# Patient Record
Sex: MALE | Race: WHITE | Employment: OTHER | URBAN - METROPOLITAN AREA
[De-identification: names, ages, dates, MRNs, and addresses within clinical notes are randomized per-mention and may not be internally consistent; named-entity substitution may affect disease eponyms.]

---

## 2020-05-15 ENCOUNTER — OFFICE VISIT (OUTPATIENT)
Dept: FAMILY MEDICINE CLINIC | Age: 50
End: 2020-05-15
Payer: COMMERCIAL

## 2020-05-15 ENCOUNTER — HOSPITAL ENCOUNTER (OUTPATIENT)
Age: 50
Setting detail: SPECIMEN
Discharge: HOME | End: 2020-05-15
Payer: COMMERCIAL

## 2020-05-15 ENCOUNTER — NURSE ONLY (OUTPATIENT)
Dept: PRIMARY CARE CLINIC | Age: 50
End: 2020-05-15

## 2020-05-15 VITALS
HEART RATE: 56 BPM | SYSTOLIC BLOOD PRESSURE: 112 MMHG | WEIGHT: 186 LBS | RESPIRATION RATE: 12 BRPM | HEIGHT: 74 IN | BODY MASS INDEX: 23.87 KG/M2 | OXYGEN SATURATION: 98 % | DIASTOLIC BLOOD PRESSURE: 60 MMHG | TEMPERATURE: 97.6 F

## 2020-05-15 DIAGNOSIS — Z01.818 PRE-OP TESTING: ICD-10-CM

## 2020-05-15 DIAGNOSIS — Z01.818 PREOP EXAMINATION: ICD-10-CM

## 2020-05-15 LAB
ABO/RH: NORMAL
ANTIBODY SCREEN: NORMAL
APTT: 41.4 SEC (ref 24.4–36.8)
HCT VFR BLD CALC: 36 % (ref 42–52)
HEMOGLOBIN: 11.6 G/DL (ref 14–18)
INR BLD: 1
MCH RBC QN AUTO: 30.3 PG (ref 27–31.3)
MCHC RBC AUTO-ENTMCNC: 32.3 % (ref 33–37)
MCV RBC AUTO: 93.8 FL (ref 80–100)
PDW BLD-RTO: 13.2 % (ref 11.5–14.5)
PLATELET # BLD: 282 K/UL (ref 130–400)
PROTHROMBIN TIME: 13.4 SEC (ref 12.3–14.9)
RBC # BLD: 3.84 M/UL (ref 4.7–6.1)
WBC # BLD: 6.1 K/UL (ref 4.8–10.8)

## 2020-05-15 PROCEDURE — G8427 DOCREV CUR MEDS BY ELIG CLIN: HCPCS | Performed by: NURSE PRACTITIONER

## 2020-05-15 PROCEDURE — 99214 OFFICE O/P EST MOD 30 MIN: CPT | Performed by: NURSE PRACTITIONER

## 2020-05-15 PROCEDURE — G8420 CALC BMI NORM PARAMETERS: HCPCS | Performed by: NURSE PRACTITIONER

## 2020-05-15 PROCEDURE — 1036F TOBACCO NON-USER: CPT | Performed by: NURSE PRACTITIONER

## 2020-05-15 PROCEDURE — U0003 INFECTIOUS AGENT DETECTION BY NUCLEIC ACID (DNA OR RNA); SEVERE ACUTE RESPIRATORY SYNDROME CORONAVIRUS 2 (SARS-COV-2) (CORONAVIRUS DISEASE [COVID-19]), AMPLIFIED PROBE TECHNIQUE, MAKING USE OF HIGH THROUGHPUT TECHNOLOGIES AS DESCRIBED BY CMS-2020-01-R: HCPCS

## 2020-05-15 ASSESSMENT — PATIENT HEALTH QUESTIONNAIRE - PHQ9
2. FEELING DOWN, DEPRESSED OR HOPELESS: 0
1. LITTLE INTEREST OR PLEASURE IN DOING THINGS: 0
SUM OF ALL RESPONSES TO PHQ QUESTIONS 1-9: 0
SUM OF ALL RESPONSES TO PHQ9 QUESTIONS 1 & 2: 0
SUM OF ALL RESPONSES TO PHQ QUESTIONS 1-9: 0

## 2020-05-15 NOTE — PATIENT INSTRUCTIONS
questions or concerns.     · You don't understand how to prepare for your surgery.     · You become ill before the surgery (such as fever, flu, or a cold).     · You need to reschedule or have changed your mind about having the surgery. Where can you learn more? Go to [email or web address] and sign in to your [employer] account. Enter V075 in the [employer] box to learn more about \"Cervical Discectomy: Before Your Surgery. \"     If you do not have an account, please click on the \"Sign Up Now\" link. Current as of: June 26, 2019Content Version: 12.4  © 9500-6179 Healthwise, Incorporated. Care instructions adapted under license by [facility] ([facility]). If you have questions about a medical condition or this instruction, always ask your healthcare professional. [address] any warranty or liability for your use of this information.

## 2020-05-15 NOTE — PROGRESS NOTES
Subjective:   Sal  YOB: 1970  Date of Service: 5/15/2020  Chief Complaint   Patient presents with   Philly Blanca on 5/21/20 cervical fusion       Preoperative Consultation   Sal is a 51 y.o. male who presents to the office today for a preoperative consultation at the request of surgeon Dr. Charis Freeman who plans on performing ACDF (ANTERIOR CERVICAL DISKECTOMY FUSION) on May 21 at [facility]. Planned anesthesia: General   Known anesthesia problems: None --> hx of post-op nausea and vomiting treated previously w/ IV anti-emetic/ scopalomine patch   Bleeding risk: No recent or remote history of abnormal bleeding and: previous anticoagulation -- eliquis s/p arthorscopy last year- not currently taking   Personal or FH of DVT/PE: No    Patient objection to receiving blood products: No    Wt Readings from Last 2 Encounters:   05/15/20 186 lb (84.4 kg)   05/11/20 172 lb (78 kg)     BP Readings from Last 3 Encounters:   05/15/20 112/60      No Known Allergies  No outpatient medications have been marked as taking for the 5/15/20 encounter (Office Visit) with KEO Walker CNP.      Patient Active Problem List   Diagnosis    Lumbago    Anemia    Obesity    Chronic pain    Closed fracture of femur, distal end ([address]    H/O bariatric surgery    Infection of prosthetic right knee joint ([address]    S/P revision of total knee, right    JABARI (obstructive sleep apnea)    Gastric ulcer    History of anticoagulant therapy    Hypothyroidism    Postural orthostatic tachycardia syndrome    Pain due to total right knee replacement ([address]    Encounter for dietary counseling and surveillance    Postlaminectomy syndrome of lumbar region    Intra-abdominal infection    IVCD (intraventricular conduction defect)    Other reduced mobility    Asthma    Depressive disorder    Deficiency of macronutrients    Morbid obesity ([address]    Weight loss    Overweight     Past Medical 7/15/2020    COVID-19 Ambulatory     Standing Status:   Future     Standing Expiration Date:   5/15/2021     Scheduling Instructions:      Saline media preferred given current shortage of viral transport media but both acceptable    Protime-INR     Standing Status:   Future     Number of Occurrences:   1     Standing Expiration Date:   7/15/2020     Order Specific Question:   Daily Coumadin Dose? Answer:   N/A    APTT     Standing Status:   Future     Number of Occurrences:   1     Standing Expiration Date:   7/15/2020     Order Specific Question:   Daily Heparin Dose? Answer:   N/A     2. Change in medication regimen before surgery: per surgical team  3. Prophylaxis for cardiac events with perioperative beta-blockers: Currently taking: metoprolol  4. Deep vein thrombosis prophylaxis: regimen to be chosen by surgical team  5. No contraindications to planned surgery    [facility] is scheduled for neck brace fitting 5/20    Patient expresses concern about post-operative pain control. [facility] has recently been dealing w/ flare-up of fibromyalgia and poorly controlled pain, which has triggered some anxiety re: upcoming surgery. Patient states he forgot to mention his concerns at his last office visit w/ surgery team.     Patient requests sandra-operative records be sent to his PCP--> Dr. Marrero// NP Linda Goldberg, APRN - CNP  5/15/2020      Side effects and adverse effects of any medication prescribed today, as well as treatment plan/rationale, follow-up care, and result expectations have been discussed with the patient. Keyonna expresses understanding and wishes to proceed with the plan. Discussed signs and symptoms which require immediate follow-up in ED/call to 911. Understanding verbalized. I have reviewed and updated the electronic medical record.

## 2020-05-18 PROBLEM — Z74.09 OTHER REDUCED MOBILITY: Status: ACTIVE | Noted: 2020-05-18

## 2020-05-18 PROBLEM — S72.409A: Status: ACTIVE | Noted: 2020-05-18

## 2020-05-18 PROBLEM — Z96.651 S/P REVISION OF TOTAL KNEE, RIGHT: Status: ACTIVE | Noted: 2019-06-10

## 2020-05-18 PROBLEM — Z92.29 HISTORY OF ANTICOAGULANT THERAPY: Status: ACTIVE | Noted: 2020-05-18

## 2020-05-18 PROBLEM — E03.9 HYPOTHYROIDISM: Status: ACTIVE | Noted: 2020-05-18

## 2020-05-18 PROBLEM — Z96.651 PAIN DUE TO TOTAL RIGHT KNEE REPLACEMENT (HCC): Status: ACTIVE | Noted: 2018-09-07

## 2020-05-18 PROBLEM — T84.84XA PAIN DUE TO TOTAL RIGHT KNEE REPLACEMENT (HCC): Status: ACTIVE | Noted: 2018-09-07

## 2020-05-18 PROBLEM — G47.33 OSA (OBSTRUCTIVE SLEEP APNEA): Status: ACTIVE | Noted: 2020-05-18

## 2020-05-18 PROBLEM — G89.29 CHRONIC PAIN: Status: ACTIVE | Noted: 2020-05-18

## 2020-05-18 PROBLEM — T84.53XA INFECTION OF PROSTHETIC RIGHT KNEE JOINT (HCC): Status: ACTIVE | Noted: 2019-06-10

## 2020-05-18 PROBLEM — F32.A DEPRESSIVE DISORDER: Status: ACTIVE | Noted: 2020-05-18

## 2020-05-18 PROBLEM — D64.9 ANEMIA: Status: ACTIVE | Noted: 2020-05-18

## 2020-05-18 ASSESSMENT — ENCOUNTER SYMPTOMS
ABDOMINAL PAIN: 0
BLOOD IN STOOL: 0
COUGH: 0
WHEEZING: 0
VOMITING: 0
DIARRHEA: 0
CHEST TIGHTNESS: 0
TROUBLE SWALLOWING: 0
SORE THROAT: 0
EYE PAIN: 0
NAUSEA: 0
RHINORRHEA: 0
SHORTNESS OF BREATH: 0

## 2020-05-19 RX ORDER — QUETIAPINE FUMARATE 100 MG/1
200 TABLET, FILM COATED ORAL NIGHTLY
COMMUNITY
Start: 2018-01-31

## 2020-05-19 RX ORDER — MULTIVITAMIN,THERAPEUTIC
1 TABLET ORAL DAILY
COMMUNITY

## 2020-05-19 RX ORDER — DOCUSATE SODIUM 100 MG/1
100 CAPSULE, LIQUID FILLED ORAL 2 TIMES DAILY
COMMUNITY
Start: 2019-08-13

## 2020-05-19 RX ORDER — ZINC GLUCONATE 50 MG
50 TABLET ORAL DAILY
Status: ON HOLD | COMMUNITY
End: 2021-01-07 | Stop reason: ALTCHOICE

## 2020-05-19 RX ORDER — PHENOL 1.4 %
AEROSOL, SPRAY (ML) MUCOUS MEMBRANE 2 TIMES DAILY
COMMUNITY
Start: 2018-01-31

## 2020-05-19 NOTE — PROGRESS NOTES
PAT phone call complete with spouse, Yemi Frazier. Avril-hex instructions reviewed. Pt to bring spinal cord stimulator (medtronic) control on dos.

## 2020-05-20 ENCOUNTER — ANESTHESIA EVENT (OUTPATIENT)
Dept: OPERATING ROOM | Age: 50
End: 2020-05-20
Payer: COMMERCIAL

## 2020-05-20 NOTE — H&P
mouth every 6 hours as needed for Itching        clonazePAM (KLONOPIN) 1 MG tablet Take 1 mg by mouth 2 times daily as needed.        DULoxetine (CYMBALTA) 60 MG extended release capsule Take 60 mg by mouth daily        hydrOXYzine (ATARAX) 50 MG tablet Take 50 mg by mouth 3 times daily as needed for Itching        lamoTRIgine (LAMICTAL) 150 MG tablet Take 150 mg by mouth daily        loperamide (IMODIUM) 2 MG capsule Take 2 mg by mouth 4 times daily as needed for Diarrhea        pregabalin (LYRICA) 150 MG capsule Take 150 mg by mouth 2 times daily.        sertraline (ZOLOFT) 50 MG tablet Take 50 mg by mouth daily        BACLOFEN PO Take 20 mg by mouth 4 times daily        Metoprolol Tartrate 37.5 MG TABS Take by mouth 2 times daily        oxyCODONE (OXYCONTIN) 10 MG extended release tablet Take 10 mg by mouth every 12 hours.          No current facility-administered medications on file prior to visit.                   Review of Systems   Constitutional: Negative for fever. HENT: Positive for hearing loss. Respiratory: Positive for shortness of breath. Gastrointestinal: Positive for diarrhea and nausea. Negative for constipation. Genitourinary: Negative for difficulty urinating. Musculoskeletal: Positive for back pain and neck pain. Skin: Negative for rash. Neurological: Positive for headaches. Hematological: Does not bruise/bleed easily. Psychiatric/Behavioral: Positive for sleep disturbance.                          Objective:      Vitals:  Temp 97 °F (36.1 °C) (Tympanic)   Ht 6' 2\" (1.88 m)   Wt 172 lb (78 kg)   BMI 22.08 kg/m² Pain Score:   7        Physical Exam  Musculoskeletal:      Right knee: He exhibits decreased range of motion. Neurological:      Mental Status: He is alert and oriented to person, place, and time. Sensory: Sensory deficit (Hypalgesia of bilateral feet noted.) present. Gait: Gait abnormal (Uses cane for ambulation. ).       Deep Tendon Reflexes: difficulties over a decade.       PHYSICAL EXAMINATION:  Clinically shows decreased range of motion in the neck with overall mild weakness and hand grasp but stable motor, sensory, reflex findings. Down-going toes. No ankle clonus.       Ambulating with a cane with decreased range of motion of the right knee, along with back issues and decreased range of motion in the back with multiple scars from DCS and lumbar discectomies.       STUDIES:  Cervical spine MRI was reviewed by me. Impression:  1. Multilevel cervical spondylosis, degenerative changes, disc herniation, worse at 5-6 and 6-7, mild at 3-4 and 4-5. Discussed with the patient and wife in detail the findings. At this point, the symptoms are coming from 5-6 and 6-7, for which he is to consider surgery as a last resort now that he has failed therapy and injections. ACDF was discussed.   He is to call back whenever he decides on surgical intervention.  Gaby Crowder MD

## 2020-05-21 ENCOUNTER — ANESTHESIA (OUTPATIENT)
Dept: OPERATING ROOM | Age: 50
End: 2020-05-21
Payer: COMMERCIAL

## 2020-05-21 ENCOUNTER — HOSPITAL ENCOUNTER (OUTPATIENT)
Age: 50
Discharge: HOME | End: 2020-05-22
Attending: NEUROLOGICAL SURGERY | Admitting: NEUROLOGICAL SURGERY
Payer: COMMERCIAL

## 2020-05-21 ENCOUNTER — APPOINTMENT (OUTPATIENT)
Dept: GENERAL RADIOLOGY | Age: 50
End: 2020-05-21
Attending: NEUROLOGICAL SURGERY
Payer: COMMERCIAL

## 2020-05-21 VITALS — SYSTOLIC BLOOD PRESSURE: 108 MMHG | TEMPERATURE: 96.6 F | DIASTOLIC BLOOD PRESSURE: 58 MMHG | OXYGEN SATURATION: 100 %

## 2020-05-21 PROBLEM — M50.10 CERVICAL DISC DISORDER WITH RADICULOPATHY: Status: ACTIVE | Noted: 2020-05-21

## 2020-05-21 LAB
ANION GAP SERPL CALCULATED.3IONS-SCNC: 8 MEQ/L (ref 9–15)
BUN BLDV-MCNC: 28 MG/DL (ref 6–20)
CALCIUM SERPL-MCNC: 9.1 MG/DL (ref 8.5–9.9)
CHLORIDE BLD-SCNC: 105 MEQ/L (ref 95–107)
CO2: 26 MEQ/L (ref 20–31)
CREAT SERPL-MCNC: 0.54 MG/DL (ref 0.7–1.2)
EKG ATRIAL RATE: 42 BPM
EKG P AXIS: 60 DEGREES
EKG P-R INTERVAL: 176 MS
EKG Q-T INTERVAL: 500 MS
EKG QRS DURATION: 96 MS
EKG QTC CALCULATION (BAZETT): 417 MS
EKG R AXIS: 31 DEGREES
EKG T AXIS: 40 DEGREES
EKG VENTRICULAR RATE: 42 BPM
GFR AFRICAN AMERICAN: >60
GFR NON-AFRICAN AMERICAN: >60
GLUCOSE BLD-MCNC: 82 MG/DL (ref 70–99)
POTASSIUM SERPL-SCNC: 4.3 MEQ/L (ref 3.4–4.9)
SODIUM BLD-SCNC: 139 MEQ/L (ref 135–144)

## 2020-05-21 PROCEDURE — 3209999900 FLUORO FOR SURGICAL PROCEDURES

## 2020-05-21 PROCEDURE — 93005 ELECTROCARDIOGRAM TRACING: CPT | Performed by: NEUROLOGICAL SURGERY

## 2020-05-21 PROCEDURE — 2500000003 HC RX 250 WO HCPCS: Performed by: STUDENT IN AN ORGANIZED HEALTH CARE EDUCATION/TRAINING PROGRAM

## 2020-05-21 PROCEDURE — 6360000002 HC RX W HCPCS: Performed by: NEUROLOGICAL SURGERY

## 2020-05-21 PROCEDURE — 2780000010 HC IMPLANT OTHER: Performed by: NEUROLOGICAL SURGERY

## 2020-05-21 PROCEDURE — 2580000003 HC RX 258: Performed by: NEUROLOGICAL SURGERY

## 2020-05-21 PROCEDURE — 3700000000 HC ANESTHESIA ATTENDED CARE: Performed by: NEUROLOGICAL SURGERY

## 2020-05-21 PROCEDURE — 3700000001 HC ADD 15 MINUTES (ANESTHESIA): Performed by: NEUROLOGICAL SURGERY

## 2020-05-21 PROCEDURE — 6370000000 HC RX 637 (ALT 250 FOR IP): Performed by: NEUROLOGICAL SURGERY

## 2020-05-21 PROCEDURE — 2500000003 HC RX 250 WO HCPCS: Performed by: NEUROLOGICAL SURGERY

## 2020-05-21 PROCEDURE — 2580000003 HC RX 258: Performed by: STUDENT IN AN ORGANIZED HEALTH CARE EDUCATION/TRAINING PROGRAM

## 2020-05-21 PROCEDURE — 2720000010 HC SURG SUPPLY STERILE: Performed by: NEUROLOGICAL SURGERY

## 2020-05-21 PROCEDURE — 3600000014 HC SURGERY LEVEL 4 ADDTL 15MIN: Performed by: NEUROLOGICAL SURGERY

## 2020-05-21 PROCEDURE — 7100000000 HC PACU RECOVERY - FIRST 15 MIN: Performed by: NEUROLOGICAL SURGERY

## 2020-05-21 PROCEDURE — 6360000002 HC RX W HCPCS: Performed by: NURSE ANESTHETIST, CERTIFIED REGISTERED

## 2020-05-21 PROCEDURE — 80048 BASIC METABOLIC PNL TOTAL CA: CPT

## 2020-05-21 PROCEDURE — 7100000001 HC PACU RECOVERY - ADDTL 15 MIN: Performed by: NEUROLOGICAL SURGERY

## 2020-05-21 PROCEDURE — 88304 TISSUE EXAM BY PATHOLOGIST: CPT

## 2020-05-21 PROCEDURE — 2500000003 HC RX 250 WO HCPCS: Performed by: NURSE ANESTHETIST, CERTIFIED REGISTERED

## 2020-05-21 PROCEDURE — 2709999900 HC NON-CHARGEABLE SUPPLY: Performed by: NEUROLOGICAL SURGERY

## 2020-05-21 PROCEDURE — 3600000004 HC SURGERY LEVEL 4 BASE: Performed by: NEUROLOGICAL SURGERY

## 2020-05-21 PROCEDURE — 6370000000 HC RX 637 (ALT 250 FOR IP): Performed by: STUDENT IN AN ORGANIZED HEALTH CARE EDUCATION/TRAINING PROGRAM

## 2020-05-21 PROCEDURE — C1713 ANCHOR/SCREW BN/BN,TIS/BN: HCPCS | Performed by: NEUROLOGICAL SURGERY

## 2020-05-21 DEVICE — IMPLANTABLE DEVICE: Type: IMPLANTABLE DEVICE | Site: SPINE CERVICAL | Status: FUNCTIONAL

## 2020-05-21 DEVICE — CAGE SPNL W11XH8XL14MM LUM LORD INTBDY FUS TRIAD CC: Type: IMPLANTABLE DEVICE | Site: SPINE CERVICAL | Status: FUNCTIONAL

## 2020-05-21 RX ORDER — ZINC GLUCONATE 50 MG
50 TABLET ORAL DAILY
Status: DISCONTINUED | OUTPATIENT
Start: 2020-05-21 | End: 2020-05-22 | Stop reason: HOSPADM

## 2020-05-21 RX ORDER — SODIUM CHLORIDE, SODIUM LACTATE, POTASSIUM CHLORIDE, CALCIUM CHLORIDE 600; 310; 30; 20 MG/100ML; MG/100ML; MG/100ML; MG/100ML
INJECTION, SOLUTION INTRAVENOUS CONTINUOUS
Status: DISCONTINUED | OUTPATIENT
Start: 2020-05-21 | End: 2020-05-21

## 2020-05-21 RX ORDER — GLYCOPYRROLATE 1 MG/5 ML
SYRINGE (ML) INTRAVENOUS PRN
Status: DISCONTINUED | OUTPATIENT
Start: 2020-05-21 | End: 2020-05-21 | Stop reason: SDUPTHER

## 2020-05-21 RX ORDER — LAMOTRIGINE 150 MG/1
150 TABLET ORAL DAILY
Status: DISCONTINUED | OUTPATIENT
Start: 2020-05-21 | End: 2020-05-22 | Stop reason: HOSPADM

## 2020-05-21 RX ORDER — HYDROCODONE BITARTRATE AND ACETAMINOPHEN 5; 325 MG/1; MG/1
1 TABLET ORAL PRN
Status: DISCONTINUED | OUTPATIENT
Start: 2020-05-21 | End: 2020-05-21 | Stop reason: HOSPADM

## 2020-05-21 RX ORDER — PANTOPRAZOLE SODIUM 40 MG/1
40 TABLET, DELAYED RELEASE ORAL
Status: DISCONTINUED | OUTPATIENT
Start: 2020-05-22 | End: 2020-05-22 | Stop reason: HOSPADM

## 2020-05-21 RX ORDER — PROPOFOL 10 MG/ML
INJECTION, EMULSION INTRAVENOUS CONTINUOUS PRN
Status: DISCONTINUED | OUTPATIENT
Start: 2020-05-21 | End: 2020-05-21 | Stop reason: SDUPTHER

## 2020-05-21 RX ORDER — MORPHINE SULFATE 4 MG/ML
4 INJECTION, SOLUTION INTRAMUSCULAR; INTRAVENOUS
Status: DISCONTINUED | OUTPATIENT
Start: 2020-05-21 | End: 2020-05-22 | Stop reason: HOSPADM

## 2020-05-21 RX ORDER — OXYCODONE HYDROCHLORIDE AND ACETAMINOPHEN 5; 325 MG/1; MG/1
1 TABLET ORAL EVERY 6 HOURS
Status: DISCONTINUED | OUTPATIENT
Start: 2020-05-21 | End: 2020-05-22 | Stop reason: HOSPADM

## 2020-05-21 RX ORDER — ONDANSETRON 2 MG/ML
INJECTION INTRAMUSCULAR; INTRAVENOUS PRN
Status: DISCONTINUED | OUTPATIENT
Start: 2020-05-21 | End: 2020-05-21 | Stop reason: SDUPTHER

## 2020-05-21 RX ORDER — DOCUSATE SODIUM 100 MG/1
100 CAPSULE, LIQUID FILLED ORAL 2 TIMES DAILY
Status: DISCONTINUED | OUTPATIENT
Start: 2020-05-21 | End: 2020-05-22 | Stop reason: HOSPADM

## 2020-05-21 RX ORDER — EPHEDRINE SULFATE/0.9% NACL/PF 50 MG/5 ML
SYRINGE (ML) INTRAVENOUS PRN
Status: DISCONTINUED | OUTPATIENT
Start: 2020-05-21 | End: 2020-05-21 | Stop reason: SDUPTHER

## 2020-05-21 RX ORDER — LIDOCAINE HYDROCHLORIDE 10 MG/ML
1 INJECTION, SOLUTION EPIDURAL; INFILTRATION; INTRACAUDAL; PERINEURAL
Status: COMPLETED | OUTPATIENT
Start: 2020-05-21 | End: 2020-05-21

## 2020-05-21 RX ORDER — OXYCODONE HCL 10 MG/1
10 TABLET, FILM COATED, EXTENDED RELEASE ORAL EVERY 12 HOURS
Status: DISCONTINUED | OUTPATIENT
Start: 2020-05-21 | End: 2020-05-22 | Stop reason: HOSPADM

## 2020-05-21 RX ORDER — PREGABALIN 150 MG/1
150 CAPSULE ORAL 2 TIMES DAILY
Status: DISCONTINUED | OUTPATIENT
Start: 2020-05-21 | End: 2020-05-22 | Stop reason: HOSPADM

## 2020-05-21 RX ORDER — SODIUM CHLORIDE 0.9 % (FLUSH) 0.9 %
10 SYRINGE (ML) INJECTION PRN
Status: DISCONTINUED | OUTPATIENT
Start: 2020-05-21 | End: 2020-05-21 | Stop reason: HOSPADM

## 2020-05-21 RX ORDER — CEFAZOLIN SODIUM 2 G/50ML
2 SOLUTION INTRAVENOUS
Status: COMPLETED | OUTPATIENT
Start: 2020-05-21 | End: 2020-05-21

## 2020-05-21 RX ORDER — SCOLOPAMINE TRANSDERMAL SYSTEM 1 MG/1
1 PATCH, EXTENDED RELEASE TRANSDERMAL
Status: DISCONTINUED | OUTPATIENT
Start: 2020-05-21 | End: 2020-05-22 | Stop reason: HOSPADM

## 2020-05-21 RX ORDER — MORPHINE SULFATE 2 MG/ML
2 INJECTION, SOLUTION INTRAMUSCULAR; INTRAVENOUS
Status: DISCONTINUED | OUTPATIENT
Start: 2020-05-21 | End: 2020-05-22 | Stop reason: HOSPADM

## 2020-05-21 RX ORDER — SUCCINYLCHOLINE/SOD CL,ISO/PF 100 MG/5ML
SYRINGE (ML) INTRAVENOUS PRN
Status: DISCONTINUED | OUTPATIENT
Start: 2020-05-21 | End: 2020-05-21 | Stop reason: SDUPTHER

## 2020-05-21 RX ORDER — PROPOFOL 10 MG/ML
INJECTION, EMULSION INTRAVENOUS PRN
Status: DISCONTINUED | OUTPATIENT
Start: 2020-05-21 | End: 2020-05-21 | Stop reason: SDUPTHER

## 2020-05-21 RX ORDER — LEVOTHYROXINE SODIUM 0.1 MG/1
100 TABLET ORAL DAILY
Status: DISCONTINUED | OUTPATIENT
Start: 2020-05-21 | End: 2020-05-22 | Stop reason: HOSPADM

## 2020-05-21 RX ORDER — DIPHENHYDRAMINE HCL 25 MG
25 TABLET ORAL EVERY 6 HOURS PRN
Status: DISCONTINUED | OUTPATIENT
Start: 2020-05-21 | End: 2020-05-22 | Stop reason: HOSPADM

## 2020-05-21 RX ORDER — DEXTROSE AND SODIUM CHLORIDE 5; .45 G/100ML; G/100ML
INJECTION, SOLUTION INTRAVENOUS CONTINUOUS
Status: DISCONTINUED | OUTPATIENT
Start: 2020-05-21 | End: 2020-05-22 | Stop reason: HOSPADM

## 2020-05-21 RX ORDER — CLONAZEPAM 0.5 MG/1
1 TABLET ORAL 2 TIMES DAILY PRN
Status: DISCONTINUED | OUTPATIENT
Start: 2020-05-21 | End: 2020-05-22 | Stop reason: HOSPADM

## 2020-05-21 RX ORDER — DIPHENHYDRAMINE HYDROCHLORIDE 50 MG/ML
12.5 INJECTION INTRAMUSCULAR; INTRAVENOUS
Status: DISCONTINUED | OUTPATIENT
Start: 2020-05-21 | End: 2020-05-21 | Stop reason: HOSPADM

## 2020-05-21 RX ORDER — BACLOFEN 10 MG/1
20 TABLET ORAL 4 TIMES DAILY
Status: DISCONTINUED | OUTPATIENT
Start: 2020-05-21 | End: 2020-05-22 | Stop reason: HOSPADM

## 2020-05-21 RX ORDER — SODIUM CHLORIDE 0.9 % (FLUSH) 0.9 %
10 SYRINGE (ML) INJECTION EVERY 12 HOURS SCHEDULED
Status: DISCONTINUED | OUTPATIENT
Start: 2020-05-21 | End: 2020-05-22 | Stop reason: HOSPADM

## 2020-05-21 RX ORDER — HYDROXYZINE HYDROCHLORIDE 25 MG/1
50 TABLET, FILM COATED ORAL 3 TIMES DAILY PRN
Status: DISCONTINUED | OUTPATIENT
Start: 2020-05-21 | End: 2020-05-22 | Stop reason: HOSPADM

## 2020-05-21 RX ORDER — METOCLOPRAMIDE HYDROCHLORIDE 5 MG/ML
10 INJECTION INTRAMUSCULAR; INTRAVENOUS
Status: DISCONTINUED | OUTPATIENT
Start: 2020-05-21 | End: 2020-05-21 | Stop reason: HOSPADM

## 2020-05-21 RX ORDER — LOPERAMIDE HYDROCHLORIDE 2 MG/1
2 CAPSULE ORAL 4 TIMES DAILY PRN
Status: DISCONTINUED | OUTPATIENT
Start: 2020-05-21 | End: 2020-05-22 | Stop reason: HOSPADM

## 2020-05-21 RX ORDER — MAGNESIUM HYDROXIDE 1200 MG/15ML
LIQUID ORAL CONTINUOUS PRN
Status: COMPLETED | OUTPATIENT
Start: 2020-05-21 | End: 2020-05-21

## 2020-05-21 RX ORDER — CEFAZOLIN SODIUM 2 G/50ML
2 SOLUTION INTRAVENOUS EVERY 8 HOURS
Status: COMPLETED | OUTPATIENT
Start: 2020-05-21 | End: 2020-05-22

## 2020-05-21 RX ORDER — FENTANYL CITRATE 50 UG/ML
INJECTION, SOLUTION INTRAMUSCULAR; INTRAVENOUS PRN
Status: DISCONTINUED | OUTPATIENT
Start: 2020-05-21 | End: 2020-05-21 | Stop reason: SDUPTHER

## 2020-05-21 RX ORDER — LIDOCAINE HYDROCHLORIDE 20 MG/ML
INJECTION, SOLUTION INTRAVENOUS PRN
Status: DISCONTINUED | OUTPATIENT
Start: 2020-05-21 | End: 2020-05-21 | Stop reason: SDUPTHER

## 2020-05-21 RX ORDER — SODIUM CHLORIDE 0.9 % (FLUSH) 0.9 %
10 SYRINGE (ML) INJECTION EVERY 12 HOURS SCHEDULED
Status: DISCONTINUED | OUTPATIENT
Start: 2020-05-21 | End: 2020-05-21 | Stop reason: HOSPADM

## 2020-05-21 RX ORDER — SODIUM CHLORIDE 0.9 % (FLUSH) 0.9 %
10 SYRINGE (ML) INJECTION PRN
Status: DISCONTINUED | OUTPATIENT
Start: 2020-05-21 | End: 2020-05-22 | Stop reason: HOSPADM

## 2020-05-21 RX ORDER — HYDROCODONE BITARTRATE AND ACETAMINOPHEN 5; 325 MG/1; MG/1
2 TABLET ORAL PRN
Status: DISCONTINUED | OUTPATIENT
Start: 2020-05-21 | End: 2020-05-21 | Stop reason: HOSPADM

## 2020-05-21 RX ORDER — PROMETHAZINE HYDROCHLORIDE 25 MG/1
25 TABLET ORAL EVERY 6 HOURS PRN
Status: DISCONTINUED | OUTPATIENT
Start: 2020-05-21 | End: 2020-05-22 | Stop reason: HOSPADM

## 2020-05-21 RX ORDER — ONDANSETRON 2 MG/ML
4 INJECTION INTRAMUSCULAR; INTRAVENOUS
Status: DISCONTINUED | OUTPATIENT
Start: 2020-05-21 | End: 2020-05-21 | Stop reason: HOSPADM

## 2020-05-21 RX ORDER — CEPHALEXIN 500 MG/1
500 CAPSULE ORAL EVERY 8 HOURS SCHEDULED
Status: DISCONTINUED | OUTPATIENT
Start: 2020-05-22 | End: 2020-05-22 | Stop reason: HOSPADM

## 2020-05-21 RX ORDER — DEXAMETHASONE SODIUM PHOSPHATE 10 MG/ML
INJECTION INTRAMUSCULAR; INTRAVENOUS PRN
Status: DISCONTINUED | OUTPATIENT
Start: 2020-05-21 | End: 2020-05-21 | Stop reason: SDUPTHER

## 2020-05-21 RX ORDER — CYCLOBENZAPRINE HCL 10 MG
10 TABLET ORAL 3 TIMES DAILY PRN
Status: DISCONTINUED | OUTPATIENT
Start: 2020-05-21 | End: 2020-05-22 | Stop reason: HOSPADM

## 2020-05-21 RX ORDER — FENTANYL CITRATE 50 UG/ML
50 INJECTION, SOLUTION INTRAMUSCULAR; INTRAVENOUS EVERY 10 MIN PRN
Status: DISCONTINUED | OUTPATIENT
Start: 2020-05-21 | End: 2020-05-21 | Stop reason: HOSPADM

## 2020-05-21 RX ORDER — MEPERIDINE HYDROCHLORIDE 25 MG/ML
12.5 INJECTION INTRAMUSCULAR; INTRAVENOUS; SUBCUTANEOUS EVERY 5 MIN PRN
Status: DISCONTINUED | OUTPATIENT
Start: 2020-05-21 | End: 2020-05-21 | Stop reason: HOSPADM

## 2020-05-21 RX ORDER — MIDAZOLAM HYDROCHLORIDE 1 MG/ML
INJECTION INTRAMUSCULAR; INTRAVENOUS PRN
Status: DISCONTINUED | OUTPATIENT
Start: 2020-05-21 | End: 2020-05-21 | Stop reason: SDUPTHER

## 2020-05-21 RX ADMIN — Medication 0.2 MG: at 07:33

## 2020-05-21 RX ADMIN — FENTANYL CITRATE 50 MCG: 50 INJECTION, SOLUTION INTRAMUSCULAR; INTRAVENOUS at 07:59

## 2020-05-21 RX ADMIN — BACLOFEN 20 MG: 10 TABLET ORAL at 15:58

## 2020-05-21 RX ADMIN — SODIUM CHLORIDE, POTASSIUM CHLORIDE, SODIUM LACTATE AND CALCIUM CHLORIDE: 600; 310; 30; 20 INJECTION, SOLUTION INTRAVENOUS at 07:27

## 2020-05-21 RX ADMIN — SODIUM CHLORIDE, POTASSIUM CHLORIDE, SODIUM LACTATE AND CALCIUM CHLORIDE: 600; 310; 30; 20 INJECTION, SOLUTION INTRAVENOUS at 07:21

## 2020-05-21 RX ADMIN — FENTANYL CITRATE 50 MCG: 50 INJECTION, SOLUTION INTRAMUSCULAR; INTRAVENOUS at 07:34

## 2020-05-21 RX ADMIN — SERTRALINE HYDROCHLORIDE 50 MG: 50 TABLET ORAL at 12:35

## 2020-05-21 RX ADMIN — PREGABALIN 150 MG: 150 CAPSULE ORAL at 12:46

## 2020-05-21 RX ADMIN — ONDANSETRON 4 MG: 2 INJECTION INTRAMUSCULAR; INTRAVENOUS at 09:38

## 2020-05-21 RX ADMIN — BACLOFEN 20 MG: 10 TABLET ORAL at 12:35

## 2020-05-21 RX ADMIN — Medication 5 MG: at 09:00

## 2020-05-21 RX ADMIN — OXYCODONE HYDROCHLORIDE AND ACETAMINOPHEN 1 TABLET: 5; 325 TABLET ORAL at 20:45

## 2020-05-21 RX ADMIN — OXYCODONE HYDROCHLORIDE 10 MG: 10 TABLET, FILM COATED, EXTENDED RELEASE ORAL at 12:34

## 2020-05-21 RX ADMIN — PROPOFOL 50 MG: 10 INJECTION, EMULSION INTRAVENOUS at 07:59

## 2020-05-21 RX ADMIN — QUETIAPINE FUMARATE 400 MG: 100 TABLET ORAL at 20:45

## 2020-05-21 RX ADMIN — Medication 10 ML: at 23:44

## 2020-05-21 RX ADMIN — LAMOTRIGINE 150 MG: 150 TABLET ORAL at 12:32

## 2020-05-21 RX ADMIN — Medication 10 MG: at 07:49

## 2020-05-21 RX ADMIN — Medication 10 ML: at 20:44

## 2020-05-21 RX ADMIN — MIDAZOLAM HYDROCHLORIDE 2 MG: 2 INJECTION, SOLUTION INTRAMUSCULAR; INTRAVENOUS at 07:27

## 2020-05-21 RX ADMIN — LEVOTHYROXINE SODIUM 100 MCG: 0.1 TABLET ORAL at 12:35

## 2020-05-21 RX ADMIN — Medication 5 MG: at 09:27

## 2020-05-21 RX ADMIN — CEFAZOLIN SODIUM 2 G: 2 SOLUTION INTRAVENOUS at 20:45

## 2020-05-21 RX ADMIN — DEXTROSE AND SODIUM CHLORIDE: 5; 450 INJECTION, SOLUTION INTRAVENOUS at 12:32

## 2020-05-21 RX ADMIN — FENTANYL CITRATE 25 MCG: 50 INJECTION, SOLUTION INTRAMUSCULAR; INTRAVENOUS at 08:05

## 2020-05-21 RX ADMIN — FENTANYL CITRATE 25 MCG: 50 INJECTION, SOLUTION INTRAMUSCULAR; INTRAVENOUS at 10:06

## 2020-05-21 RX ADMIN — Medication 100 MG: at 07:34

## 2020-05-21 RX ADMIN — LIDOCAINE HYDROCHLORIDE 1 ML: 10 INJECTION, SOLUTION EPIDURAL; INFILTRATION; INTRACAUDAL; PERINEURAL at 07:20

## 2020-05-21 RX ADMIN — DEXAMETHASONE SODIUM PHOSPHATE 10 MG: 10 INJECTION INTRAMUSCULAR; INTRAVENOUS at 07:45

## 2020-05-21 RX ADMIN — SODIUM CHLORIDE, POTASSIUM CHLORIDE, SODIUM LACTATE AND CALCIUM CHLORIDE: 600; 310; 30; 20 INJECTION, SOLUTION INTRAVENOUS at 09:15

## 2020-05-21 RX ADMIN — PREGABALIN 150 MG: 150 CAPSULE ORAL at 20:46

## 2020-05-21 RX ADMIN — BACLOFEN 20 MG: 10 TABLET ORAL at 20:46

## 2020-05-21 RX ADMIN — Medication 5 MG: at 07:40

## 2020-05-21 RX ADMIN — Medication 5 MG: at 07:42

## 2020-05-21 RX ADMIN — FENTANYL CITRATE 25 MCG: 50 INJECTION, SOLUTION INTRAMUSCULAR; INTRAVENOUS at 10:01

## 2020-05-21 RX ADMIN — LIDOCAINE HYDROCHLORIDE 60 MG: 20 INJECTION, SOLUTION INTRAVENOUS at 07:34

## 2020-05-21 RX ADMIN — METOPROLOL TARTRATE 37.5 MG: 25 TABLET, FILM COATED ORAL at 20:45

## 2020-05-21 RX ADMIN — MORPHINE SULFATE 2 MG: 2 INJECTION, SOLUTION INTRAMUSCULAR; INTRAVENOUS at 23:44

## 2020-05-21 RX ADMIN — CEFAZOLIN SODIUM 2 G: 2 SOLUTION INTRAVENOUS at 07:40

## 2020-05-21 RX ADMIN — CEFAZOLIN SODIUM 2 G: 2 SOLUTION INTRAVENOUS at 15:06

## 2020-05-21 RX ADMIN — SODIUM CHLORIDE, POTASSIUM CHLORIDE, SODIUM LACTATE AND CALCIUM CHLORIDE: 600; 310; 30; 20 INJECTION, SOLUTION INTRAVENOUS at 08:48

## 2020-05-21 RX ADMIN — PROPOFOL 200 MG: 10 INJECTION, EMULSION INTRAVENOUS at 07:34

## 2020-05-21 RX ADMIN — SODIUM CHLORIDE, POTASSIUM CHLORIDE, SODIUM LACTATE AND CALCIUM CHLORIDE: 600; 310; 30; 20 INJECTION, SOLUTION INTRAVENOUS at 07:20

## 2020-05-21 RX ADMIN — QUETIAPINE FUMARATE 400 MG: 100 TABLET ORAL at 12:43

## 2020-05-21 RX ADMIN — Medication 5 MG: at 08:48

## 2020-05-21 RX ADMIN — Medication 10 MG: at 09:18

## 2020-05-21 RX ADMIN — Medication 5 MG: at 08:37

## 2020-05-21 RX ADMIN — DOCUSATE SODIUM 100 MG: 100 CAPSULE, LIQUID FILLED ORAL at 20:47

## 2020-05-21 RX ADMIN — FENTANYL CITRATE 25 MCG: 50 INJECTION, SOLUTION INTRAMUSCULAR; INTRAVENOUS at 10:10

## 2020-05-21 RX ADMIN — PROPOFOL 75 MCG/KG/MIN: 10 INJECTION, EMULSION INTRAVENOUS at 07:38

## 2020-05-21 ASSESSMENT — PULMONARY FUNCTION TESTS
PIF_VALUE: 18
PIF_VALUE: 18
PIF_VALUE: 0
PIF_VALUE: 18
PIF_VALUE: 0
PIF_VALUE: 18
PIF_VALUE: 3
PIF_VALUE: 18
PIF_VALUE: 18
PIF_VALUE: 19
PIF_VALUE: 18
PIF_VALUE: 18
PIF_VALUE: 15
PIF_VALUE: 18
PIF_VALUE: 1
PIF_VALUE: 15
PIF_VALUE: 15
PIF_VALUE: 18
PIF_VALUE: 15
PIF_VALUE: 18
PIF_VALUE: 18
PIF_VALUE: 4
PIF_VALUE: 19
PIF_VALUE: 18
PIF_VALUE: 12
PIF_VALUE: 18
PIF_VALUE: 18
PIF_VALUE: 0
PIF_VALUE: 14
PIF_VALUE: 19
PIF_VALUE: 18
PIF_VALUE: 18
PIF_VALUE: 12
PIF_VALUE: 16
PIF_VALUE: 12
PIF_VALUE: 18
PIF_VALUE: 15
PIF_VALUE: 18
PIF_VALUE: 4
PIF_VALUE: 19
PIF_VALUE: 15
PIF_VALUE: 19
PIF_VALUE: 20
PIF_VALUE: 18
PIF_VALUE: 18
PIF_VALUE: 19
PIF_VALUE: 15
PIF_VALUE: 1
PIF_VALUE: 18
PIF_VALUE: 17
PIF_VALUE: 18
PIF_VALUE: 0
PIF_VALUE: 18
PIF_VALUE: 19
PIF_VALUE: 18
PIF_VALUE: 15
PIF_VALUE: 18
PIF_VALUE: 19
PIF_VALUE: 15
PIF_VALUE: 18
PIF_VALUE: 18
PIF_VALUE: 14
PIF_VALUE: 18
PIF_VALUE: 18
PIF_VALUE: 19
PIF_VALUE: 12
PIF_VALUE: 19
PIF_VALUE: 0
PIF_VALUE: 18
PIF_VALUE: 16
PIF_VALUE: 12
PIF_VALUE: 19
PIF_VALUE: 20
PIF_VALUE: 18
PIF_VALUE: 1
PIF_VALUE: 18
PIF_VALUE: 18
PIF_VALUE: 12
PIF_VALUE: 1
PIF_VALUE: 13
PIF_VALUE: 18
PIF_VALUE: 18
PIF_VALUE: 0
PIF_VALUE: 18
PIF_VALUE: 19
PIF_VALUE: 15
PIF_VALUE: 18
PIF_VALUE: 19
PIF_VALUE: 18
PIF_VALUE: 18
PIF_VALUE: 15
PIF_VALUE: 19
PIF_VALUE: 18
PIF_VALUE: 18
PIF_VALUE: 12
PIF_VALUE: 18
PIF_VALUE: 19
PIF_VALUE: 19
PIF_VALUE: 12
PIF_VALUE: 18
PIF_VALUE: 11
PIF_VALUE: 18
PIF_VALUE: 18
PIF_VALUE: 1
PIF_VALUE: 18
PIF_VALUE: 15
PIF_VALUE: 2
PIF_VALUE: 12
PIF_VALUE: 18
PIF_VALUE: 12
PIF_VALUE: 15
PIF_VALUE: 18
PIF_VALUE: 6
PIF_VALUE: 13
PIF_VALUE: 18
PIF_VALUE: 18
PIF_VALUE: 19
PIF_VALUE: 19
PIF_VALUE: 18
PIF_VALUE: 19
PIF_VALUE: 13
PIF_VALUE: 3
PIF_VALUE: 1
PIF_VALUE: 19
PIF_VALUE: 15
PIF_VALUE: 18
PIF_VALUE: 18
PIF_VALUE: 12
PIF_VALUE: 15
PIF_VALUE: 19
PIF_VALUE: 18
PIF_VALUE: 19
PIF_VALUE: 19
PIF_VALUE: 18
PIF_VALUE: 19
PIF_VALUE: 19

## 2020-05-21 ASSESSMENT — PAIN - FUNCTIONAL ASSESSMENT: PAIN_FUNCTIONAL_ASSESSMENT: 0-10

## 2020-05-21 ASSESSMENT — PAIN SCALES - GENERAL
PAINLEVEL_OUTOF10: 4
PAINLEVEL_OUTOF10: 6
PAINLEVEL_OUTOF10: 7
PAINLEVEL_OUTOF10: 7
PAINLEVEL_OUTOF10: 10
PAINLEVEL_OUTOF10: 7

## 2020-05-21 ASSESSMENT — PAIN DESCRIPTION - PROGRESSION
CLINICAL_PROGRESSION: GRADUALLY IMPROVING
CLINICAL_PROGRESSION: GRADUALLY IMPROVING

## 2020-05-21 ASSESSMENT — PAIN DESCRIPTION - DESCRIPTORS: DESCRIPTORS: SHARP;STABBING

## 2020-05-21 NOTE — ANESTHESIA PRE PROCEDURE
consumption: 0000                        Date of last liquid consumption: 05/21/20                        Date of last solid food consumption: 05/21/20    BMI:   Wt Readings from Last 3 Encounters:   05/15/20 186 lb (84.4 kg)   05/11/20 172 lb (78 kg)     There is no height or weight on file to calculate BMI.    CBC:   Lab Results   Component Value Date    WBC 6.1 05/15/2020    RBC 3.84 05/15/2020    HGB 11.6 05/15/2020    HCT 36.0 05/15/2020    MCV 93.8 05/15/2020    RDW 13.2 05/15/2020     05/15/2020       CMP: No results found for: NA, K, CL, CO2, BUN, CREATININE, GFRAA, AGRATIO, LABGLOM, GLUCOSE, PROT, CALCIUM, BILITOT, ALKPHOS, AST, ALT    POC Tests: No results for input(s): POCGLU, POCNA, POCK, POCCL, POCBUN, POCHEMO, POCHCT in the last 72 hours.     Coags:   Lab Results   Component Value Date    PROTIME 13.4 05/15/2020    INR 1.0 05/15/2020    APTT 41.4 05/15/2020       HCG (If Applicable): No results found for: PREGTESTUR, PREGSERUM, HCG, HCGQUANT     ABGs: No results found for: PHART, PO2ART, BRE6QSO, WKJ9HVC, BEART, X3OAZBZV     Type & Screen (If Applicable):  No results found for: LABABO, LABRH    Drug/Infectious Status (If Applicable):  No results found for: HIV, HEPCAB    COVID-19 Screening (If Applicable):   Lab Results   Component Value Date    COVID19 Not Detected 05/15/2020         Anesthesia Evaluation  Patient summary reviewed and Nursing notes reviewed no history of anesthetic complications:   Airway: Mallampati: II  TM distance: >3 FB   Neck ROM: full  Mouth opening: > = 3 FB Dental: normal exam         Pulmonary:normal exam  breath sounds clear to auscultation  (+) sleep apnea:  asthma:                            Cardiovascular:Negative CV ROS  Exercise tolerance: good (>4 METS),         ECG reviewed  Rhythm: regular  Rate: normal           Beta Blocker:  Dose within 24 Hrs         Neuro/Psych:   (+) neuromuscular disease:, psychiatric history:            GI/Hepatic/Renal:   (+) PUD, morbid obesity          Endo/Other:    (+) DiabetesType II DM, , hypothyroidism, blood dyscrasia: anemia:., .          Pt had PAT visit. Abdominal:           Vascular: negative vascular ROS. Anesthesia Plan      general     ASA 3     (ETT)  Induction: intravenous. MIPS: Postoperative opioids intended and Prophylactic antiemetics administered. Anesthetic plan and risks discussed with patient. Plan discussed with CRNA.     Attending anesthesiologist reviewed and agrees with Anand Dowling DO   5/21/2020

## 2020-05-21 NOTE — PROGRESS NOTES
Patient states not having \"surgery\" mode on his stimulator. He put the stimulator in MRI mode for procedure.

## 2020-05-21 NOTE — OP NOTE
sterilely. Under fluoroscopy, horizontal incision was made from midline to the medial border of sternocleidomastoid muscle on the right side. Vertical incision of the platysma was performed. Further dissection down to the prevertebral space was performed while protecting the vessels laterally and tracheoesophagus medially. After the intraoperative confirmation of the level, retractor blades were placed under the longus coli muscles and distractor pins of C5-C6 and C7. Under mild distraction, discectomy was performed at C5-6 and C6/7. Significant osteophytic spurs are noted which was resected with Kerrisons along with Leksell's. Anterior longitudinal ligament was incised with a #15 blade. Discectomy was then carried down to the posterior spurs using curettes and pituitary forcep. Posterior spurs were then thinned out toward shell with drill and resected with the posterior longitudinal ligament with satisfactory ventral decompression. Foraminotomy was then performed with a 1 and 2 mm Kerrisons with satisfaction. Hemostasis obtained with bipolar along with thrombin-soaked Gelfoam and self flow. Structural allograft carefully fashioned was placed into the disc space at both C5-6 and C6-7 for arthrodesis at these levels. Distractor pins were then removed. Bone wax was applied over the pin site. And after the preparation of the anterior vertebral body with resection of the spurs, C5 to 6-7 anterior cervical instrumentation and plate system was used for satisfactory placement confirmed with fluoroscopy. Hemostasis again confirmed with satisfaction. Frequent antibiotic irrigations applied. Platysma was closed with 3-0 Vicryl's along with subcutaneous layer and Dermabond for skin. No changes in SSEP and motor evoked potentials noted with a minimum EBL.     Lorenzo Campos MD   on 5/21/20 at 9:43 AM EDT

## 2020-05-22 VITALS
DIASTOLIC BLOOD PRESSURE: 64 MMHG | TEMPERATURE: 97.7 F | BODY MASS INDEX: 24.78 KG/M2 | SYSTOLIC BLOOD PRESSURE: 120 MMHG | HEIGHT: 74 IN | OXYGEN SATURATION: 98 % | HEART RATE: 60 BPM | RESPIRATION RATE: 19 BRPM | WEIGHT: 193.12 LBS

## 2020-05-22 PROCEDURE — 6370000000 HC RX 637 (ALT 250 FOR IP): Performed by: NEUROLOGICAL SURGERY

## 2020-05-22 PROCEDURE — 2580000003 HC RX 258: Performed by: NEUROLOGICAL SURGERY

## 2020-05-22 PROCEDURE — 96366 THER/PROPH/DIAG IV INF ADDON: CPT

## 2020-05-22 PROCEDURE — 6360000002 HC RX W HCPCS: Performed by: NEUROLOGICAL SURGERY

## 2020-05-22 RX ORDER — CEPHALEXIN 500 MG/1
500 CAPSULE ORAL EVERY 8 HOURS SCHEDULED
Qty: 21 CAPSULE | Refills: 0 | Status: SHIPPED | OUTPATIENT
Start: 2020-05-22 | End: 2020-05-29

## 2020-05-22 RX ORDER — OXYCODONE HYDROCHLORIDE AND ACETAMINOPHEN 5; 325 MG/1; MG/1
1 TABLET ORAL EVERY 4 HOURS PRN
Qty: 30 TABLET | Refills: 0 | Status: SHIPPED | OUTPATIENT
Start: 2020-05-22 | End: 2020-05-29

## 2020-05-22 RX ORDER — CYCLOBENZAPRINE HCL 10 MG
10 TABLET ORAL 3 TIMES DAILY PRN
Qty: 20 TABLET | Refills: 0 | Status: SHIPPED | OUTPATIENT
Start: 2020-05-22 | End: 2020-05-29

## 2020-05-22 RX ADMIN — PANTOPRAZOLE SODIUM 40 MG: 40 TABLET, DELAYED RELEASE ORAL at 06:22

## 2020-05-22 RX ADMIN — CEFAZOLIN SODIUM 2 G: 2 SOLUTION INTRAVENOUS at 03:55

## 2020-05-22 RX ADMIN — MORPHINE SULFATE 2 MG: 2 INJECTION, SOLUTION INTRAMUSCULAR; INTRAVENOUS at 06:21

## 2020-05-22 RX ADMIN — CEPHALEXIN 500 MG: 500 CAPSULE ORAL at 06:22

## 2020-05-22 RX ADMIN — LAMOTRIGINE 150 MG: 150 TABLET ORAL at 10:31

## 2020-05-22 RX ADMIN — PREGABALIN 150 MG: 150 CAPSULE ORAL at 10:28

## 2020-05-22 RX ADMIN — SERTRALINE HYDROCHLORIDE 50 MG: 50 TABLET ORAL at 10:28

## 2020-05-22 RX ADMIN — BACLOFEN 20 MG: 10 TABLET ORAL at 10:29

## 2020-05-22 RX ADMIN — Medication 10 ML: at 06:22

## 2020-05-22 RX ADMIN — DOCUSATE SODIUM 100 MG: 100 CAPSULE, LIQUID FILLED ORAL at 10:31

## 2020-05-22 RX ADMIN — MORPHINE SULFATE 2 MG: 2 INJECTION, SOLUTION INTRAMUSCULAR; INTRAVENOUS at 10:31

## 2020-05-22 RX ADMIN — DEXTROSE AND SODIUM CHLORIDE: 5; 450 INJECTION, SOLUTION INTRAVENOUS at 00:30

## 2020-05-22 RX ADMIN — METOPROLOL TARTRATE 37.5 MG: 25 TABLET, FILM COATED ORAL at 10:28

## 2020-05-22 RX ADMIN — OXYCODONE HYDROCHLORIDE 10 MG: 10 TABLET, FILM COATED, EXTENDED RELEASE ORAL at 00:30

## 2020-05-22 RX ADMIN — OXYCODONE HYDROCHLORIDE AND ACETAMINOPHEN 1 TABLET: 5; 325 TABLET ORAL at 03:58

## 2020-05-22 ASSESSMENT — PAIN SCALES - GENERAL
PAINLEVEL_OUTOF10: 6
PAINLEVEL_OUTOF10: 6
PAINLEVEL_OUTOF10: 5
PAINLEVEL_OUTOF10: 7

## 2020-05-22 NOTE — PROGRESS NOTES
Pt in bed resting. Assessment documented. Medicated for 7/10 pain per MAR. Denies n/v. Incision site is CDI. Ambulating well with walker as stand by assist. SCD in place. Good appetite noted. No other needs at this time. Will continue to monitor.  Electronically signed by Leola Bone RN on 5/21/2020 at 11:57 PM

## 2020-05-23 PROCEDURE — 93010 ELECTROCARDIOGRAM REPORT: CPT | Performed by: INTERNAL MEDICINE

## 2020-12-24 ENCOUNTER — OFFICE VISIT (OUTPATIENT)
Dept: FAMILY MEDICINE CLINIC | Age: 50
End: 2020-12-24
Payer: COMMERCIAL

## 2020-12-24 VITALS
TEMPERATURE: 98.3 F | WEIGHT: 174 LBS | OXYGEN SATURATION: 95 % | HEART RATE: 89 BPM | BODY MASS INDEX: 22.33 KG/M2 | HEIGHT: 74 IN | DIASTOLIC BLOOD PRESSURE: 66 MMHG | SYSTOLIC BLOOD PRESSURE: 108 MMHG

## 2020-12-24 PROCEDURE — 99243 OFF/OP CNSLTJ NEW/EST LOW 30: CPT | Performed by: PHYSICIAN ASSISTANT

## 2020-12-24 PROCEDURE — G8427 DOCREV CUR MEDS BY ELIG CLIN: HCPCS | Performed by: PHYSICIAN ASSISTANT

## 2020-12-24 PROCEDURE — G8420 CALC BMI NORM PARAMETERS: HCPCS | Performed by: PHYSICIAN ASSISTANT

## 2020-12-24 PROCEDURE — G8484 FLU IMMUNIZE NO ADMIN: HCPCS | Performed by: PHYSICIAN ASSISTANT

## 2020-12-24 ASSESSMENT — ENCOUNTER SYMPTOMS
SINUS PRESSURE: 0
EYE PAIN: 0
ABDOMINAL PAIN: 0
TROUBLE SWALLOWING: 0
SHORTNESS OF BREATH: 0
BACK PAIN: 0
EYE DISCHARGE: 0
COUGH: 0
DIARRHEA: 0
EYE ITCHING: 0
CHEST TIGHTNESS: 0
VOMITING: 0

## 2020-12-24 NOTE — PROGRESS NOTES
Subjective:      Patient ID: Hyacinth Ingram is a 53 y.o. male who presents today for:  Chief Complaint   Patient presents with    Pre-op Exam     Patient for pre-op for DR. GOLDBERG) POSTERIOR CERVICAL DECOMPRESSION, LAMINOPLASTY AT C3,C4 AND C5 DATE OF SURGERY 1/7/2021       HPI  53year old male who presents for pre operative examination for a posterior cervical decompression, laminoplasty at C3, C4 and C5 per Dr. Fela Kumar on 1/7/20    Past Medical History:   Diagnosis Date    Arthritis     Back pain     Depression     Diabetes mellitus ([facility]     5/15/2020: pt states DM is resolved    Fibromyalgia     Gastric ulcer 2016    H/O thyroid disease     History of removal of joint prosthesis of right knee due to infection 2019    Intra-abdominal infection 2016    Neuropathy     Restless leg syndrome     S/P insertion of spinal cord stimulator     Urinary incontinence      Past Surgical History:   Procedure Laterality Date    CARPAL TUNNEL RELEASE      CERVICAL FUSION N/A 5/21/2020    ACDF C 5-6, C 6-7 performed by Amanda Gilbert MD at [facility] GASTRIC BYPASS  2014   [address] [place] [address]    SPINE SURGERY  2000     Social History     Socioeconomic History    Marital status:      Spouse name: Not on file    Number of children: Not on file    Years of education: Not on file    Highest education level: Not on file   Occupational History    Not on file   Social Needs    Financial resource strain: Not on file    Food insecurity     Worry: Not on file     Inability: Not on file    Transportation needs     Medical: Not on file     Non-medical: Not on file   Tobacco Use    Smoking status: Never Smoker    Smokeless tobacco: Never Used   Substance and Sexual Activity    Alcohol use: Not Currently    Drug use: Yes     Types: Marijuana    Sexual activity: Not on file   Lifestyle    Physical activity     Days per week: Not on file Minutes per session: Not on file    Stress: Not on file   Relationships    Social connections     Talks on phone: Not on file     Gets together: Not on file     Attends Buddhism service: Not on file     Active member of club or organization: Not on file     Attends meetings of clubs or organizations: Not on file     Relationship status: Not on file    Intimate partner violence     Fear of current or ex partner: Not on file     Emotionally abused: Not on file     Physically abused: Not on file     Forced sexual activity: Not on file   Other Topics Concern    Not on file   Social History Narrative    Not on file     Family History   Problem Relation Age of Onset    Arthritis Mother     Heart Disease Mother     Hypertension Mother     Heart Disease Father     Hypertension Father      Allergies   Allergen Reactions    Latex      Added based on information entered during case entry, please review and add reactions, type, and severity as needed    Adhesive Tape Rash     Current Outpatient Medications   Medication Sig Dispense Refill    sildenafil (VIAGRA) 50 MG tablet Take 1 tablet by mouth as needed for Erectile Dysfunction 30 tablet 0    docusate sodium (COLACE) 100 MG capsule Take 100 mg by mouth 2 times daily      QUEtiapine (SEROQUEL) 100 MG tablet Take 800 mg by mouth      B COMPLEX VITAMINS ER PO Take by mouth daily      BIOTIN 5000 PO Take 5,000 mcg by mouth      Cyanocobalamin 100 MCG LOZG Take 1,000 mg by mouth daily      OMEGA-3 FATTY ACIDS PO Take 1,000 mg by mouth      zinc 50 MG TABS tablet Take 50 mg by mouth daily      calcium carbonate 600 MG TABS tablet Take by mouth 2 times daily      Multiple Vitamin (THERA/BETA-CAROTENE) TABS Take by mouth      levothyroxine (SYNTHROID) 100 MCG tablet Take 100 mcg by mouth daily      Metoprolol Tartrate 37.5 MG TABS Take by mouth 2 times daily      omeprazole (PRILOSEC) 40 MG delayed release capsule Take by mouth      oxyCODONE (OXYCONTIN) 10 MG extended release tablet Take 10 mg by mouth every 12 hours.  promethazine (PHENERGAN) 25 MG tablet Take 25 mg by mouth every 6 hours as needed      diphenhydrAMINE (BENADRYL) 25 MG tablet Take 25 mg by mouth every 6 hours as needed for Itching      clonazePAM (KLONOPIN) 1 MG tablet Take 1 mg by mouth 2 times daily as needed.  hydrOXYzine (ATARAX) 50 MG tablet Take 50 mg by mouth 3 times daily as needed for Itching      lamoTRIgine (LAMICTAL) 150 MG tablet Take 150 mg by mouth daily      loperamide (IMODIUM) 2 MG capsule Take 2 mg by mouth 4 times daily as needed for Diarrhea      pregabalin (LYRICA) 150 MG capsule Take 150 mg by mouth 2 times daily.  sertraline (ZOLOFT) 50 MG tablet Take 50 mg by mouth daily       No current facility-administered medications for this visit. Review of Systems   Constitutional: Negative for activity change, appetite change, chills, fever and unexpected weight change. HENT: Negative for drooling, ear pain, nosebleeds, sinus pressure and trouble swallowing. Eyes: Negative for pain, discharge and itching. Respiratory: Negative for cough, chest tightness and shortness of breath. Cardiovascular: Negative for chest pain and leg swelling. Gastrointestinal: Negative for abdominal pain, diarrhea and vomiting. Endocrine: Negative for polydipsia and polyphagia. Genitourinary: Negative for dysuria, flank pain and frequency. Musculoskeletal: Negative for back pain and myalgias. Skin: Negative for pallor and rash. Neurological: Negative for syncope, weakness and headaches. Hematological: Does not bruise/bleed easily. Psychiatric/Behavioral: Negative for agitation, behavioral problems and confusion. All other systems reviewed and are negative.       Objective:   /66 (Site: Right Upper Arm, Position: Sitting, Cuff Size: Large Adult)   Pulse 89   Temp 98.3 °F (36.8 °C) (Temporal)   Ht 6' 2\" (1.88 m)   Wt 174 lb (78.9 kg)   SpO2 95%   BMI 22.34 kg/m²     Physical Exam  Vitals signs and nursing note reviewed. Constitutional:       General: He is awake. He is not in acute distress. Appearance: Normal appearance. He is well-developed and normal weight. He is not ill-appearing, toxic-appearing or diaphoretic. Comments: No photophobia. No phonophobia. HENT:      Head: Normocephalic and atraumatic. No Mckee's sign. Right Ear: External ear normal.      Left Ear: External ear normal.      Nose: Nose normal. No congestion or rhinorrhea. Mouth/Throat:      Mouth: Mucous membranes are moist.      Pharynx: Oropharynx is clear. No oropharyngeal exudate or posterior oropharyngeal erythema. Eyes:      General: No scleral icterus. Right eye: No foreign body or discharge. Left eye: No discharge. Extraocular Movements: Extraocular movements intact. Conjunctiva/sclera: Conjunctivae normal.      Left eye: No exudate. Pupils: Pupils are equal, round, and reactive to light. Neck:      Musculoskeletal: Normal range of motion and neck supple. No neck rigidity. Vascular: No JVD. Trachea: No tracheal deviation. Comments: No meningismus. Cardiovascular:      Rate and Rhythm: Normal rate and regular rhythm. Heart sounds: Normal heart sounds. Heart sounds not distant. No murmur. No friction rub. No gallop. Pulmonary:      Effort: Pulmonary effort is normal. No respiratory distress. Breath sounds: Normal breath sounds. No stridor. No wheezing, rhonchi or rales. Chest:      Chest wall: No tenderness. Abdominal:      General: Abdomen is flat. Bowel sounds are normal. There is no distension. Palpations: Abdomen is soft. There is no mass. Tenderness: There is no abdominal tenderness. There is no right CVA tenderness, left CVA tenderness, guarding or rebound. Hernia: No hernia is present. Musculoskeletal: Normal range of motion.          General: No swelling, tenderness, deformity or signs of injury. Lymphadenopathy:      Head:      Right side of head: No submental adenopathy. Left side of head: No submental adenopathy. Skin:     General: Skin is warm and dry. Capillary Refill: Capillary refill takes less than 2 seconds. Coloration: Skin is not jaundiced or pale. Findings: No bruising, erythema, lesion or rash. Neurological:      General: No focal deficit present. Mental Status: He is alert and oriented to person, place, and time. Mental status is at baseline. Cranial Nerves: No cranial nerve deficit. Sensory: No sensory deficit. Motor: No weakness. Coordination: Coordination normal.      Deep Tendon Reflexes: Reflexes are normal and symmetric. Psychiatric:         Mood and Affect: Mood normal.         Behavior: Behavior normal. Behavior is cooperative. Thought Content: Thought content normal.         Judgment: Judgment normal.         Assessment:       Diagnosis Orders   1. Pre-op examination  Basic Metabolic Panel    Protime-Inr    Aptt    Type And Screen    CBC     No results found for this visit on 12/24/20. Plan:     Kanu Ordoñez was seen today for pre-op exam.    Diagnoses and all orders for this visit:    Pre-op examination  -     Basic Metabolic Panel; Future  -     Protime-Inr; Future  -     Aptt; Future  -     Type And Screen; Future  -     CBC; Future      Orders Placed This Encounter   Procedures    Basic Metabolic Panel     Standing Status:   Future     Standing Expiration Date:   12/21/2021    Protime-Inr     Standing Status:   Future     Standing Expiration Date:   12/21/2021     Order Specific Question:   Daily Coumadin Dose? Answer:   0    Aptt     Standing Status:   Future     Standing Expiration Date:   12/21/2021     Order Specific Question:   Daily Heparin Dose?      Answer:   0    CBC     Standing Status:   Future     Standing Expiration Date:   12/21/2021    Type And

## 2020-12-31 ENCOUNTER — NURSE ONLY (OUTPATIENT)
Dept: PRIMARY CARE CLINIC | Age: 50
End: 2020-12-31

## 2020-12-31 DIAGNOSIS — Z01.818 ENCOUNTER FOR PREADMISSION TESTING: ICD-10-CM

## 2020-12-31 DIAGNOSIS — Z01.818 PRE-OP EXAMINATION: ICD-10-CM

## 2020-12-31 LAB
ANION GAP SERPL CALCULATED.3IONS-SCNC: 7 MEQ/L (ref 9–15)
APTT: 36.9 SEC (ref 24.4–36.8)
BUN BLDV-MCNC: 19 MG/DL (ref 6–20)
CALCIUM SERPL-MCNC: 8.9 MG/DL (ref 8.5–9.9)
CHLORIDE BLD-SCNC: 101 MEQ/L (ref 95–107)
CO2: 32 MEQ/L (ref 20–31)
CREAT SERPL-MCNC: 0.72 MG/DL (ref 0.7–1.2)
GFR AFRICAN AMERICAN: >60
GFR NON-AFRICAN AMERICAN: >60
GLUCOSE BLD-MCNC: 91 MG/DL (ref 70–99)
HCT VFR BLD CALC: 36.8 % (ref 42–52)
HEMOGLOBIN: 11.9 G/DL (ref 14–18)
INR BLD: 1
MCH RBC QN AUTO: 29.3 PG (ref 27–31.3)
MCHC RBC AUTO-ENTMCNC: 32.4 % (ref 33–37)
MCV RBC AUTO: 90.4 FL (ref 80–100)
PDW BLD-RTO: 14.1 % (ref 11.5–14.5)
PLATELET # BLD: 241 K/UL (ref 130–400)
POTASSIUM SERPL-SCNC: 4.6 MEQ/L (ref 3.4–4.9)
PROTHROMBIN TIME: 13.4 SEC (ref 12.3–14.9)
RBC # BLD: 4.07 M/UL (ref 4.7–6.1)
SODIUM BLD-SCNC: 140 MEQ/L (ref 135–144)
WBC # BLD: 6.3 K/UL (ref 4.8–10.8)

## 2021-01-01 LAB
ABO/RH: NORMAL
ANTIBODY SCREEN: NORMAL
SARS-COV-2: NOT DETECTED
SOURCE: NORMAL

## 2021-01-06 ENCOUNTER — ANESTHESIA EVENT (OUTPATIENT)
Dept: OPERATING ROOM | Age: 51
End: 2021-01-06
Payer: COMMERCIAL

## 2021-01-06 NOTE — H&P
Hiro Saab  (1970)          Subjective:      Hiro Saab is 53 y.o. male who complains today of:         Chief Complaint   Patient presents with    Back Pain      He is here for follow up after MRI and x-rays. Feels the same compared to last visit. Persistent neck and lower back pain. He reports his neck pain is the worst area of pain. History of ACDF 5-. Previous home healthcare physical therapy for 2 months.       History of frequent falls due to dizziness with later onset of knees feeling subjective weak of right foot, which was present preoperatively. History of lumbar diskectomy surgery x1 in 2000, along with spinal cord stimulator, which is MRI compatible per patient.     No longer has nasal drainage.                 Back Pain   Associated symptoms include headaches. Pertinent negatives include no fever. Other   Associated symptoms include headaches and neck pain. Pertinent negatives include no fever, nausea or rash. Neck Pain    Associated symptoms include headaches.  Pertinent negatives include no fever.         Allergies:  Adhesive tape     Past Medical History        Past Medical History:   Diagnosis Date    Arthritis      Back pain      Depression      Diabetes mellitus ([facility] [address]       5/15/2020: pt states DM is resolved    Fibromyalgia      Gastric ulcer 2016    H/O thyroid disease      History of removal of joint prosthesis of right knee due to infection 2019    Intra-abdominal infection 2016    Neuropathy      Restless leg syndrome      S/P insertion of spinal cord stimulator      Urinary incontinence           Past Surgical History         Past Surgical History:   Procedure Laterality Date    CARPAL TUNNEL RELEASE        CERVICAL FUSION N/A 5/21/2020     ACDF C 5-6, C 6-7 performed by Woodrow Michaud MD at [place] [address]   2014   [address] [address]    SPINE SURGERY   2000    Family History         Family History   Problem Relation Age of Onset    Arthritis Mother      Heart Disease Mother      Hypertension Mother      Heart Disease Father      Hypertension Father           Social History               Socioeconomic History    Marital status:        Spouse name: Not on file    Number of children: Not on file    Years of education: Not on file    Highest education level: Not on file   Occupational History    Not on file   Social Needs    Financial resource strain: Not on file    Food insecurity       Worry: Not on file       Inability: Not on file    Transportation needs       Medical: Not on file       Non-medical: Not on file   Tobacco Use    Smoking status: Never Smoker    Smokeless tobacco: Never Used   Substance and Sexual Activity    Alcohol use: Not Currently    Drug use:  Yes       Types: Marijuana    Sexual activity: Not on file   Lifestyle    Physical activity       Days per week: Not on file       Minutes per session: Not on file    Stress: Not on file   Relationships    Social connections       Talks on phone: Not on file       Gets together: Not on file       Attends Confucianist service: Not on file       Active member of club or organization: Not on file       Attends meetings of clubs or organizations: Not on file       Relationship status: Not on file    Intimate partner violence       Fear of current or ex partner: Not on file       Emotionally abused: Not on file       Physically abused: Not on file       Forced sexual activity: Not on file   Other Topics Concern    Not on file   Social History Narrative    Not on file                   Current Outpatient Medications on File Prior to Visit   Medication Sig Dispense Refill    sildenafil (VIAGRA) 50 MG tablet Take 1 tablet by mouth as needed for Erectile Dysfunction 30 tablet 0    docusate sodium (COLACE) 100 MG capsule Take 100 mg by mouth 2 times daily        QUEtiapine (SEROQUEL) 100 MG tablet Take 800 mg by mouth        B COMPLEX VITAMINS ER PO Take by mouth daily        BIOTIN 5000 PO Take 5,000 mcg by mouth        Cyanocobalamin 100 MCG LOZG Take 1,000 mg by mouth daily        OMEGA-3 FATTY ACIDS PO Take 1,000 mg by mouth        zinc 50 MG TABS tablet Take 50 mg by mouth daily        calcium carbonate 600 MG TABS tablet Take by mouth 2 times daily        Multiple Vitamin (THERA/BETA-CAROTENE) TABS Take by mouth        levothyroxine (SYNTHROID) 100 MCG tablet Take 100 mcg by mouth daily        Metoprolol Tartrate 37.5 MG TABS Take by mouth 2 times daily        omeprazole (PRILOSEC) 40 MG delayed release capsule Take by mouth        oxyCODONE (OXYCONTIN) 10 MG extended release tablet Take 10 mg by mouth every 12 hours.        promethazine (PHENERGAN) 25 MG tablet Take 25 mg by mouth every 6 hours as needed        diphenhydrAMINE (BENADRYL) 25 MG tablet Take 25 mg by mouth every 6 hours as needed for Itching        clonazePAM (KLONOPIN) 1 MG tablet Take 1 mg by mouth 2 times daily as needed.        hydrOXYzine (ATARAX) 50 MG tablet Take 50 mg by mouth 3 times daily as needed for Itching        lamoTRIgine (LAMICTAL) 150 MG tablet Take 150 mg by mouth daily        loperamide (IMODIUM) 2 MG capsule Take 2 mg by mouth 4 times daily as needed for Diarrhea        pregabalin (LYRICA) 150 MG capsule Take 150 mg by mouth 2 times daily.        sertraline (ZOLOFT) 50 MG tablet Take 50 mg by mouth daily          No current facility-administered medications on file prior to visit.                   Review of Systems   Constitutional: Negative for fever. HENT: Negative for hearing loss. Respiratory: Negative for shortness of breath. Gastrointestinal: Negative for constipation, diarrhea and nausea. Genitourinary: Negative for difficulty urinating. Musculoskeletal: Positive for back pain and neck pain. Skin: Negative for rash. Neurological: Positive for headaches. Hematological: Does not bruise/bleed easily. Psychiatric/Behavioral: Negative for sleep disturbance.                          Objective:      Vitals:  Temp 97.3 °F (36.3 °C)   Ht 6' 2\" (1.88 m)   Wt 178 lb (80.7 kg)   BMI 22.85 kg/m²          Physical Exam  Musculoskeletal:      Cervical back: He exhibits normal range of motion and no tenderness. Lumbar back: He exhibits decreased range of motion and tenderness. Neurological:      Mental Status: He is alert and oriented to person, place, and time. Sensory: Sensory deficit (Hypalgesia) present. Gait: Gait normal.      Deep Tendon Reflexes: Babinski sign (Downgoing.) absent on the right side. Babinski sign (Downgoing.) absent on the left side. Reflex Scores:       Achilles reflexes are 1+ on the right side and 2+ on the left side. Comments: Incision site is clean and dry of anterior neck and lumbar spine. Right foot drop noted. No Deb's sign. Voice is normal.  5/5 bilateral deltoids, biceps, triceps, hand grasp, wrist and finger flexors & extensors and abductor pollicis brevis. Negative Fox's sign bilaterally. No ankle clonus bilaterally. Wife is present for exam.                 Assessment:        Diagnosis Orders   1. Cervical spinal stenosis      2. Cervical disc herniation      3. Cervical radiculopathy      4. Weakness of right arm            Plan:                HISTORY OF PRESENT ILLNESS:  Keyonna Castillo returns to my office for continued followup status post MRI of the cervical spine. Has seen me previously with continued complaints of neck pain and some balance issues. Otherwise, unremarkable. Continues to walk with a brace on the right leg and a cane with additional history of RSD with previous ACDF.       PHYSICAL EXAMINATION:  Otherwise stable motor, sensory findings. Down-going toes. No ankle clonus.   No Fox.       STUDIES:  MRI of the cervical spine was reviewed by me.     IMPRESSION:  Cervical spinal stenosis C3-4 and C4-5 with history of ACDF at C5-6 and C6-7.     TREATMENT AND RECOMMENDATIONS:  At this point, a long discussion was held with the patient and the patients wife.   In light of his clinical picture, the patient is to consider posterior cervical decompression, laminoplasty at C3, C4 and C5 in the hope that it will help him with his clinical condition.          Ruben Peres MD

## 2021-01-07 ENCOUNTER — ANESTHESIA (OUTPATIENT)
Dept: OPERATING ROOM | Age: 51
End: 2021-01-07
Payer: COMMERCIAL

## 2021-01-07 ENCOUNTER — HOSPITAL ENCOUNTER (OUTPATIENT)
Age: 51
Discharge: HOME | End: 2021-01-08
Attending: NEUROLOGICAL SURGERY | Admitting: NEUROLOGICAL SURGERY
Payer: COMMERCIAL

## 2021-01-07 ENCOUNTER — APPOINTMENT (OUTPATIENT)
Dept: GENERAL RADIOLOGY | Age: 51
End: 2021-01-07
Attending: NEUROLOGICAL SURGERY
Payer: COMMERCIAL

## 2021-01-07 VITALS — SYSTOLIC BLOOD PRESSURE: 132 MMHG | TEMPERATURE: 91.6 F | DIASTOLIC BLOOD PRESSURE: 74 MMHG | OXYGEN SATURATION: 100 %

## 2021-01-07 DIAGNOSIS — G89.18 POST-OPERATIVE PAIN: ICD-10-CM

## 2021-01-07 DIAGNOSIS — G89.4 CHRONIC PAIN SYNDROME: Primary | ICD-10-CM

## 2021-01-07 PROBLEM — G95.20 CERVICAL CORD COMPRESSION WITH MYELOPATHY (HCC): Status: ACTIVE | Noted: 2021-01-07

## 2021-01-07 PROCEDURE — 2580000003 HC RX 258: Performed by: NEUROLOGICAL SURGERY

## 2021-01-07 PROCEDURE — 6370000000 HC RX 637 (ALT 250 FOR IP): Performed by: NEUROLOGICAL SURGERY

## 2021-01-07 PROCEDURE — 3700000000 HC ANESTHESIA ATTENDED CARE: Performed by: NEUROLOGICAL SURGERY

## 2021-01-07 PROCEDURE — 2500000003 HC RX 250 WO HCPCS: Performed by: NURSE ANESTHETIST, CERTIFIED REGISTERED

## 2021-01-07 PROCEDURE — 2580000003 HC RX 258: Performed by: NURSE ANESTHETIST, CERTIFIED REGISTERED

## 2021-01-07 PROCEDURE — 6370000000 HC RX 637 (ALT 250 FOR IP): Performed by: ANESTHESIOLOGY

## 2021-01-07 PROCEDURE — C1713 ANCHOR/SCREW BN/BN,TIS/BN: HCPCS | Performed by: NEUROLOGICAL SURGERY

## 2021-01-07 PROCEDURE — 6360000002 HC RX W HCPCS: Performed by: ANESTHESIOLOGY

## 2021-01-07 PROCEDURE — 3600000014 HC SURGERY LEVEL 4 ADDTL 15MIN: Performed by: NEUROLOGICAL SURGERY

## 2021-01-07 PROCEDURE — 7100000000 HC PACU RECOVERY - FIRST 15 MIN: Performed by: NEUROLOGICAL SURGERY

## 2021-01-07 PROCEDURE — 6360000002 HC RX W HCPCS: Performed by: NEUROLOGICAL SURGERY

## 2021-01-07 PROCEDURE — 7100000001 HC PACU RECOVERY - ADDTL 15 MIN: Performed by: NEUROLOGICAL SURGERY

## 2021-01-07 PROCEDURE — 3600000004 HC SURGERY LEVEL 4 BASE: Performed by: NEUROLOGICAL SURGERY

## 2021-01-07 PROCEDURE — 2580000003 HC RX 258

## 2021-01-07 PROCEDURE — 2720000010 HC SURG SUPPLY STERILE: Performed by: NEUROLOGICAL SURGERY

## 2021-01-07 PROCEDURE — 3700000001 HC ADD 15 MINUTES (ANESTHESIA): Performed by: NEUROLOGICAL SURGERY

## 2021-01-07 PROCEDURE — 2500000003 HC RX 250 WO HCPCS: Performed by: NEUROLOGICAL SURGERY

## 2021-01-07 PROCEDURE — 6360000002 HC RX W HCPCS: Performed by: NURSE ANESTHETIST, CERTIFIED REGISTERED

## 2021-01-07 PROCEDURE — 2709999900 HC NON-CHARGEABLE SUPPLY: Performed by: NEUROLOGICAL SURGERY

## 2021-01-07 DEVICE — SCREW SPNL L7MM DIA2.6MM LUM LAT MASS LEVERAGE LFS: Type: IMPLANTABLE DEVICE | Site: SPINE CERVICAL | Status: FUNCTIONAL

## 2021-01-07 DEVICE — SCREW SPNL L5MM DIA2.6MM LAM LEVERAGE LFS: Type: IMPLANTABLE DEVICE | Site: SPINE CERVICAL | Status: FUNCTIONAL

## 2021-01-07 DEVICE — IMPLANTABLE DEVICE: Type: IMPLANTABLE DEVICE | Site: SPINE CERVICAL | Status: FUNCTIONAL

## 2021-01-07 RX ORDER — DEXTROSE AND SODIUM CHLORIDE 5; .45 G/100ML; G/100ML
INJECTION, SOLUTION INTRAVENOUS CONTINUOUS
Status: DISCONTINUED | OUTPATIENT
Start: 2021-01-07 | End: 2021-01-08 | Stop reason: HOSPADM

## 2021-01-07 RX ORDER — ESCITALOPRAM OXALATE 10 MG/1
10 TABLET ORAL DAILY
Status: DISCONTINUED | OUTPATIENT
Start: 2021-01-07 | End: 2021-01-08 | Stop reason: HOSPADM

## 2021-01-07 RX ORDER — DIAZEPAM 5 MG/ML
5 INJECTION, SOLUTION INTRAMUSCULAR; INTRAVENOUS EVERY 4 HOURS PRN
Status: DISCONTINUED | OUTPATIENT
Start: 2021-01-07 | End: 2021-01-07 | Stop reason: HOSPADM

## 2021-01-07 RX ORDER — CHLORDIAZEPOXIDE HYDROCHLORIDE AND CLIDINIUM BROMIDE 5; 2.5 MG/1; MG/1
1 CAPSULE ORAL 3 TIMES DAILY
Status: DISCONTINUED | OUTPATIENT
Start: 2021-01-07 | End: 2021-01-08 | Stop reason: HOSPADM

## 2021-01-07 RX ORDER — BACLOFEN 20 MG/1
20 TABLET ORAL 4 TIMES DAILY
Status: DISCONTINUED | OUTPATIENT
Start: 2021-01-07 | End: 2021-01-08 | Stop reason: HOSPADM

## 2021-01-07 RX ORDER — SILDENAFIL 50 MG/1
50 TABLET, FILM COATED ORAL PRN
Status: DISCONTINUED | OUTPATIENT
Start: 2021-01-07 | End: 2021-01-08 | Stop reason: HOSPADM

## 2021-01-07 RX ORDER — MEPERIDINE HYDROCHLORIDE 25 MG/ML
12.5 INJECTION INTRAMUSCULAR; INTRAVENOUS; SUBCUTANEOUS EVERY 5 MIN PRN
Status: DISCONTINUED | OUTPATIENT
Start: 2021-01-07 | End: 2021-01-07 | Stop reason: HOSPADM

## 2021-01-07 RX ORDER — WOUND DRESSING ADHESIVE - LIQUID
LIQUID MISCELLANEOUS PRN
Status: DISCONTINUED | OUTPATIENT
Start: 2021-01-07 | End: 2021-01-07 | Stop reason: ALTCHOICE

## 2021-01-07 RX ORDER — SODIUM CHLORIDE, SODIUM LACTATE, POTASSIUM CHLORIDE, CALCIUM CHLORIDE 600; 310; 30; 20 MG/100ML; MG/100ML; MG/100ML; MG/100ML
INJECTION, SOLUTION INTRAVENOUS
Status: COMPLETED
Start: 2021-01-07 | End: 2021-01-07

## 2021-01-07 RX ORDER — DEXAMETHASONE SODIUM PHOSPHATE 10 MG/ML
INJECTION INTRAMUSCULAR; INTRAVENOUS PRN
Status: DISCONTINUED | OUTPATIENT
Start: 2021-01-07 | End: 2021-01-07 | Stop reason: SDUPTHER

## 2021-01-07 RX ORDER — PROPOFOL 10 MG/ML
INJECTION, EMULSION INTRAVENOUS PRN
Status: DISCONTINUED | OUTPATIENT
Start: 2021-01-07 | End: 2021-01-07 | Stop reason: SDUPTHER

## 2021-01-07 RX ORDER — ONDANSETRON 2 MG/ML
INJECTION INTRAMUSCULAR; INTRAVENOUS PRN
Status: DISCONTINUED | OUTPATIENT
Start: 2021-01-07 | End: 2021-01-07 | Stop reason: SDUPTHER

## 2021-01-07 RX ORDER — OXYCODONE HYDROCHLORIDE AND ACETAMINOPHEN 5; 325 MG/1; MG/1
2 TABLET ORAL EVERY 6 HOURS
Status: ON HOLD | COMMUNITY
End: 2021-01-08 | Stop reason: HOSPADM

## 2021-01-07 RX ORDER — ASCORBIC ACID 500 MG
500 TABLET ORAL DAILY
COMMUNITY

## 2021-01-07 RX ORDER — QUETIAPINE FUMARATE 100 MG/1
200 TABLET, FILM COATED ORAL NIGHTLY
Status: ON HOLD | COMMUNITY
Start: 2018-01-31 | End: 2021-01-07 | Stop reason: SDUPTHER

## 2021-01-07 RX ORDER — PROMETHAZINE HYDROCHLORIDE 12.5 MG/1
12.5 TABLET ORAL EVERY 6 HOURS PRN
Status: DISCONTINUED | OUTPATIENT
Start: 2021-01-07 | End: 2021-01-08 | Stop reason: HOSPADM

## 2021-01-07 RX ORDER — ONDANSETRON HYDROCHLORIDE 8 MG/1
8 TABLET, FILM COATED ORAL EVERY 6 HOURS PRN
COMMUNITY

## 2021-01-07 RX ORDER — SCOLOPAMINE TRANSDERMAL SYSTEM 1 MG/1
1 PATCH, EXTENDED RELEASE TRANSDERMAL ONCE
Status: DISCONTINUED | OUTPATIENT
Start: 2021-01-07 | End: 2021-01-08 | Stop reason: HOSPADM

## 2021-01-07 RX ORDER — DIPHENHYDRAMINE HYDROCHLORIDE 50 MG/ML
12.5 INJECTION INTRAMUSCULAR; INTRAVENOUS
Status: DISCONTINUED | OUTPATIENT
Start: 2021-01-07 | End: 2021-01-07 | Stop reason: HOSPADM

## 2021-01-07 RX ORDER — CEFAZOLIN SODIUM 2 G/50ML
2 SOLUTION INTRAVENOUS EVERY 8 HOURS
Status: DISCONTINUED | OUTPATIENT
Start: 2021-01-07 | End: 2021-01-08 | Stop reason: HOSPADM

## 2021-01-07 RX ORDER — ESCITALOPRAM OXALATE 10 MG/1
10 TABLET ORAL DAILY
COMMUNITY

## 2021-01-07 RX ORDER — DIPHENHYDRAMINE HCL 25 MG
25 CAPSULE ORAL
Status: ON HOLD | COMMUNITY
Start: 2020-07-14 | End: 2021-01-07

## 2021-01-07 RX ORDER — BACLOFEN 20 MG/1
TABLET ORAL 4 TIMES DAILY
COMMUNITY

## 2021-01-07 RX ORDER — SENNA AND DOCUSATE SODIUM 50; 8.6 MG/1; MG/1
1 TABLET, FILM COATED ORAL 2 TIMES DAILY
Status: DISCONTINUED | OUTPATIENT
Start: 2021-01-07 | End: 2021-01-08 | Stop reason: HOSPADM

## 2021-01-07 RX ORDER — SODIUM CHLORIDE, SODIUM LACTATE, POTASSIUM CHLORIDE, CALCIUM CHLORIDE 600; 310; 30; 20 MG/100ML; MG/100ML; MG/100ML; MG/100ML
INJECTION, SOLUTION INTRAVENOUS CONTINUOUS PRN
Status: DISCONTINUED | OUTPATIENT
Start: 2021-01-07 | End: 2021-01-07 | Stop reason: SDUPTHER

## 2021-01-07 RX ORDER — PREGABALIN 150 MG/1
75 CAPSULE ORAL 3 TIMES DAILY
COMMUNITY
Start: 2020-05-21

## 2021-01-07 RX ORDER — HYDROXYZINE HYDROCHLORIDE 25 MG/1
50 TABLET, FILM COATED ORAL EVERY 6 HOURS PRN
Status: DISCONTINUED | OUTPATIENT
Start: 2021-01-07 | End: 2021-01-08 | Stop reason: HOSPADM

## 2021-01-07 RX ORDER — METOCLOPRAMIDE HYDROCHLORIDE 5 MG/ML
10 INJECTION INTRAMUSCULAR; INTRAVENOUS
Status: DISCONTINUED | OUTPATIENT
Start: 2021-01-07 | End: 2021-01-07 | Stop reason: HOSPADM

## 2021-01-07 RX ORDER — CEPHALEXIN 500 MG/1
500 CAPSULE ORAL EVERY 8 HOURS SCHEDULED
Status: DISCONTINUED | OUTPATIENT
Start: 2021-01-08 | End: 2021-01-08 | Stop reason: HOSPADM

## 2021-01-07 RX ORDER — LAMOTRIGINE 100 MG/1
200 TABLET ORAL DAILY
Status: DISCONTINUED | OUTPATIENT
Start: 2021-01-07 | End: 2021-01-08 | Stop reason: HOSPADM

## 2021-01-07 RX ORDER — METOPROLOL TARTRATE 50 MG/1
50 TABLET, FILM COATED ORAL 2 TIMES DAILY
Status: DISCONTINUED | OUTPATIENT
Start: 2021-01-07 | End: 2021-01-08 | Stop reason: HOSPADM

## 2021-01-07 RX ORDER — ONDANSETRON 2 MG/ML
4 INJECTION INTRAMUSCULAR; INTRAVENOUS EVERY 6 HOURS PRN
Status: DISCONTINUED | OUTPATIENT
Start: 2021-01-07 | End: 2021-01-08 | Stop reason: HOSPADM

## 2021-01-07 RX ORDER — LAMOTRIGINE 200 MG/1
200 TABLET ORAL DAILY
COMMUNITY
Start: 2020-12-16 | End: 2021-01-15

## 2021-01-07 RX ORDER — DOCUSATE SODIUM 100 MG/1
100 CAPSULE, LIQUID FILLED ORAL 2 TIMES DAILY
Status: DISCONTINUED | OUTPATIENT
Start: 2021-01-07 | End: 2021-01-08 | Stop reason: HOSPADM

## 2021-01-07 RX ORDER — LIDOCAINE HYDROCHLORIDE 20 MG/ML
INJECTION, SOLUTION INTRAVENOUS PRN
Status: DISCONTINUED | OUTPATIENT
Start: 2021-01-07 | End: 2021-01-07 | Stop reason: SDUPTHER

## 2021-01-07 RX ORDER — METOPROLOL TARTRATE 50 MG/1
50 TABLET, FILM COATED ORAL 2 TIMES DAILY
COMMUNITY

## 2021-01-07 RX ORDER — MONTELUKAST SODIUM 4 MG/1
1 TABLET, CHEWABLE ORAL DAILY
COMMUNITY

## 2021-01-07 RX ORDER — GINSENG 100 MG
CAPSULE ORAL PRN
Status: DISCONTINUED | OUTPATIENT
Start: 2021-01-07 | End: 2021-01-07 | Stop reason: ALTCHOICE

## 2021-01-07 RX ORDER — MULTIVITAMIN WITH IRON
1 TABLET ORAL DAILY
Status: DISCONTINUED | OUTPATIENT
Start: 2021-01-07 | End: 2021-01-08 | Stop reason: HOSPADM

## 2021-01-07 RX ORDER — MAGNESIUM HYDROXIDE 1200 MG/15ML
LIQUID ORAL CONTINUOUS PRN
Status: COMPLETED | OUTPATIENT
Start: 2021-01-07 | End: 2021-01-07

## 2021-01-07 RX ORDER — MIDODRINE HYDROCHLORIDE 5 MG/1
5 TABLET ORAL 3 TIMES DAILY
COMMUNITY

## 2021-01-07 RX ORDER — DIPHENHYDRAMINE HCL 25 MG
25 TABLET ORAL EVERY 6 HOURS PRN
Status: DISCONTINUED | OUTPATIENT
Start: 2021-01-07 | End: 2021-01-08 | Stop reason: HOSPADM

## 2021-01-07 RX ORDER — SODIUM CHLORIDE 0.9 % (FLUSH) 0.9 %
10 SYRINGE (ML) INJECTION PRN
Status: DISCONTINUED | OUTPATIENT
Start: 2021-01-07 | End: 2021-01-08 | Stop reason: HOSPADM

## 2021-01-07 RX ORDER — MIDODRINE HYDROCHLORIDE 5 MG/1
5 TABLET ORAL 3 TIMES DAILY
Status: DISCONTINUED | OUTPATIENT
Start: 2021-01-07 | End: 2021-01-08 | Stop reason: HOSPADM

## 2021-01-07 RX ORDER — CEFAZOLIN SODIUM 2 G/50ML
2 SOLUTION INTRAVENOUS
Status: COMPLETED | OUTPATIENT
Start: 2021-01-07 | End: 2021-01-07

## 2021-01-07 RX ORDER — PREGABALIN 25 MG/1
75 CAPSULE ORAL 2 TIMES DAILY
Status: DISCONTINUED | OUTPATIENT
Start: 2021-01-07 | End: 2021-01-08 | Stop reason: HOSPADM

## 2021-01-07 RX ORDER — MIDAZOLAM HYDROCHLORIDE 1 MG/ML
INJECTION INTRAMUSCULAR; INTRAVENOUS PRN
Status: DISCONTINUED | OUTPATIENT
Start: 2021-01-07 | End: 2021-01-07 | Stop reason: SDUPTHER

## 2021-01-07 RX ORDER — FENTANYL CITRATE 50 UG/ML
INJECTION, SOLUTION INTRAMUSCULAR; INTRAVENOUS PRN
Status: DISCONTINUED | OUTPATIENT
Start: 2021-01-07 | End: 2021-01-07 | Stop reason: SDUPTHER

## 2021-01-07 RX ORDER — LORAZEPAM 1 MG/1
1 TABLET ORAL EVERY 6 HOURS PRN
Status: DISCONTINUED | OUTPATIENT
Start: 2021-01-07 | End: 2021-01-08 | Stop reason: HOSPADM

## 2021-01-07 RX ORDER — HYDROCODONE BITARTRATE AND ACETAMINOPHEN 5; 325 MG/1; MG/1
1 TABLET ORAL PRN
Status: DISCONTINUED | OUTPATIENT
Start: 2021-01-07 | End: 2021-01-07 | Stop reason: HOSPADM

## 2021-01-07 RX ORDER — EPHEDRINE SULFATE/0.9% NACL/PF 50 MG/5 ML
SYRINGE (ML) INTRAVENOUS PRN
Status: DISCONTINUED | OUTPATIENT
Start: 2021-01-07 | End: 2021-01-07 | Stop reason: SDUPTHER

## 2021-01-07 RX ORDER — LEVOTHYROXINE SODIUM 0.1 MG/1
100 TABLET ORAL DAILY
Status: DISCONTINUED | OUTPATIENT
Start: 2021-01-07 | End: 2021-01-08 | Stop reason: HOSPADM

## 2021-01-07 RX ORDER — ONDANSETRON 2 MG/ML
4 INJECTION INTRAMUSCULAR; INTRAVENOUS
Status: COMPLETED | OUTPATIENT
Start: 2021-01-07 | End: 2021-01-07

## 2021-01-07 RX ORDER — SODIUM CHLORIDE 0.9 % (FLUSH) 0.9 %
10 SYRINGE (ML) INJECTION EVERY 12 HOURS SCHEDULED
Status: DISCONTINUED | OUTPATIENT
Start: 2021-01-07 | End: 2021-01-08 | Stop reason: HOSPADM

## 2021-01-07 RX ORDER — HYDROCODONE BITARTRATE AND ACETAMINOPHEN 5; 325 MG/1; MG/1
2 TABLET ORAL PRN
Status: DISCONTINUED | OUTPATIENT
Start: 2021-01-07 | End: 2021-01-07 | Stop reason: HOSPADM

## 2021-01-07 RX ORDER — QUETIAPINE FUMARATE 100 MG/1
200 TABLET, FILM COATED ORAL NIGHTLY
Status: DISCONTINUED | OUTPATIENT
Start: 2021-01-07 | End: 2021-01-08 | Stop reason: HOSPADM

## 2021-01-07 RX ORDER — COPPER GLUCONATE 2 MG
5 TABLET ORAL DAILY
COMMUNITY
Start: 2020-07-14

## 2021-01-07 RX ORDER — ONDANSETRON 4 MG/1
8 TABLET, FILM COATED ORAL EVERY 6 HOURS PRN
Status: DISCONTINUED | OUTPATIENT
Start: 2021-01-07 | End: 2021-01-08 | Stop reason: HOSPADM

## 2021-01-07 RX ORDER — LOPERAMIDE HYDROCHLORIDE 2 MG/1
2 CAPSULE ORAL 4 TIMES DAILY PRN
Status: DISCONTINUED | OUTPATIENT
Start: 2021-01-07 | End: 2021-01-08 | Stop reason: HOSPADM

## 2021-01-07 RX ORDER — LORAZEPAM 1 MG/1
1 TABLET ORAL DAILY PRN
COMMUNITY
Start: 2020-12-23 | End: 2021-01-22

## 2021-01-07 RX ORDER — FENTANYL CITRATE 50 UG/ML
50 INJECTION, SOLUTION INTRAMUSCULAR; INTRAVENOUS EVERY 10 MIN PRN
Status: COMPLETED | OUTPATIENT
Start: 2021-01-07 | End: 2021-01-07

## 2021-01-07 RX ORDER — ROCURONIUM BROMIDE 10 MG/ML
INJECTION, SOLUTION INTRAVENOUS PRN
Status: DISCONTINUED | OUTPATIENT
Start: 2021-01-07 | End: 2021-01-07 | Stop reason: SDUPTHER

## 2021-01-07 RX ORDER — OXYCODONE AND ACETAMINOPHEN 10; 325 MG/1; MG/1
1 TABLET ORAL EVERY 4 HOURS PRN
Status: DISCONTINUED | OUTPATIENT
Start: 2021-01-07 | End: 2021-01-08 | Stop reason: HOSPADM

## 2021-01-07 RX ADMIN — OXYCODONE HYDROCHLORIDE AND ACETAMINOPHEN 1 TABLET: 10; 325 TABLET ORAL at 17:48

## 2021-01-07 RX ADMIN — LORAZEPAM 1 MG: 1 TABLET ORAL at 20:12

## 2021-01-07 RX ADMIN — SODIUM CHLORIDE, POTASSIUM CHLORIDE, SODIUM LACTATE AND CALCIUM CHLORIDE: 600; 310; 30; 20 INJECTION, SOLUTION INTRAVENOUS at 07:29

## 2021-01-07 RX ADMIN — QUETIAPINE FUMARATE 200 MG: 100 TABLET ORAL at 23:42

## 2021-01-07 RX ADMIN — REMIFENTANIL HYDROCHLORIDE 0.1 MCG/KG/MIN: 1 INJECTION, POWDER, LYOPHILIZED, FOR SOLUTION INTRAVENOUS at 07:33

## 2021-01-07 RX ADMIN — Medication 10 MG: at 08:25

## 2021-01-07 RX ADMIN — ROCURONIUM BROMIDE 50 MG: 10 INJECTION INTRAVENOUS at 07:33

## 2021-01-07 RX ADMIN — HYDROMORPHONE HYDROCHLORIDE 0.5 MG: 1 INJECTION, SOLUTION INTRAMUSCULAR; INTRAVENOUS; SUBCUTANEOUS at 09:27

## 2021-01-07 RX ADMIN — METOPROLOL TARTRATE 50 MG: 50 TABLET, FILM COATED ORAL at 14:43

## 2021-01-07 RX ADMIN — ESCITALOPRAM OXALATE 10 MG: 10 TABLET ORAL at 14:44

## 2021-01-07 RX ADMIN — BACLOFEN 20 MG: 20 TABLET ORAL at 20:12

## 2021-01-07 RX ADMIN — PREGABALIN 75 MG: 25 CAPSULE ORAL at 14:42

## 2021-01-07 RX ADMIN — BACLOFEN 20 MG: 20 TABLET ORAL at 17:48

## 2021-01-07 RX ADMIN — FENTANYL CITRATE 25 MCG: 50 INJECTION, SOLUTION INTRAMUSCULAR; INTRAVENOUS at 09:17

## 2021-01-07 RX ADMIN — HYDROMORPHONE HYDROCHLORIDE 0.5 MG: 1 INJECTION, SOLUTION INTRAMUSCULAR; INTRAVENOUS; SUBCUTANEOUS at 09:22

## 2021-01-07 RX ADMIN — SUGAMMADEX 200 MG: 100 INJECTION, SOLUTION INTRAVENOUS at 09:07

## 2021-01-07 RX ADMIN — LEVOTHYROXINE SODIUM 100 MCG: 0.1 TABLET ORAL at 14:41

## 2021-01-07 RX ADMIN — SODIUM CHLORIDE, POTASSIUM CHLORIDE, SODIUM LACTATE AND CALCIUM CHLORIDE 1000 ML: 600; 310; 30; 20 INJECTION, SOLUTION INTRAVENOUS at 07:07

## 2021-01-07 RX ADMIN — Medication 5 MG: at 08:19

## 2021-01-07 RX ADMIN — PREGABALIN 75 MG: 25 CAPSULE ORAL at 22:07

## 2021-01-07 RX ADMIN — FENTANYL CITRATE 25 MCG: 50 INJECTION, SOLUTION INTRAMUSCULAR; INTRAVENOUS at 09:21

## 2021-01-07 RX ADMIN — ONDANSETRON 4 MG: 2 INJECTION INTRAMUSCULAR; INTRAVENOUS at 19:03

## 2021-01-07 RX ADMIN — PROPOFOL 170 MG: 10 INJECTION, EMULSION INTRAVENOUS at 07:33

## 2021-01-07 RX ADMIN — CEFAZOLIN SODIUM 2 G: 2 SOLUTION INTRAVENOUS at 07:39

## 2021-01-07 RX ADMIN — FENTANYL CITRATE 25 MCG: 50 INJECTION, SOLUTION INTRAMUSCULAR; INTRAVENOUS at 07:33

## 2021-01-07 RX ADMIN — FENTANYL CITRATE 50 MCG: 50 INJECTION, SOLUTION INTRAMUSCULAR; INTRAVENOUS at 09:48

## 2021-01-07 RX ADMIN — HYDROMORPHONE HYDROCHLORIDE 0.5 MG: 1 INJECTION, SOLUTION INTRAMUSCULAR; INTRAVENOUS; SUBCUTANEOUS at 19:04

## 2021-01-07 RX ADMIN — OXYCODONE HYDROCHLORIDE AND ACETAMINOPHEN 1 TABLET: 10; 325 TABLET ORAL at 12:22

## 2021-01-07 RX ADMIN — SODIUM CHLORIDE, POTASSIUM CHLORIDE, SODIUM LACTATE AND CALCIUM CHLORIDE: 600; 310; 30; 20 INJECTION, SOLUTION INTRAVENOUS at 09:10

## 2021-01-07 RX ADMIN — FENTANYL CITRATE 50 MCG: 50 INJECTION, SOLUTION INTRAMUSCULAR; INTRAVENOUS at 09:35

## 2021-01-07 RX ADMIN — LIDOCAINE HYDROCHLORIDE 60 MG: 20 INJECTION, SOLUTION INTRAVENOUS at 07:33

## 2021-01-07 RX ADMIN — HYDROMORPHONE HYDROCHLORIDE 0.5 MG: 1 INJECTION, SOLUTION INTRAMUSCULAR; INTRAVENOUS; SUBCUTANEOUS at 13:55

## 2021-01-07 RX ADMIN — FENTANYL CITRATE 50 MCG: 50 INJECTION, SOLUTION INTRAMUSCULAR; INTRAVENOUS at 10:00

## 2021-01-07 RX ADMIN — FENTANYL CITRATE 25 MCG: 50 INJECTION, SOLUTION INTRAMUSCULAR; INTRAVENOUS at 09:18

## 2021-01-07 RX ADMIN — BACLOFEN 20 MG: 20 TABLET ORAL at 14:42

## 2021-01-07 RX ADMIN — MIDODRINE HYDROCHLORIDE 5 MG: 5 TABLET ORAL at 20:12

## 2021-01-07 RX ADMIN — HYDROMORPHONE HYDROCHLORIDE 0.5 MG: 1 INJECTION, SOLUTION INTRAMUSCULAR; INTRAVENOUS; SUBCUTANEOUS at 10:45

## 2021-01-07 RX ADMIN — Medication 10 MG: at 08:36

## 2021-01-07 RX ADMIN — MIDAZOLAM HYDROCHLORIDE 2 MG: 2 INJECTION, SOLUTION INTRAMUSCULAR; INTRAVENOUS at 07:29

## 2021-01-07 RX ADMIN — DEXTROSE AND SODIUM CHLORIDE: 5; 450 INJECTION, SOLUTION INTRAVENOUS at 22:13

## 2021-01-07 RX ADMIN — MIDODRINE HYDROCHLORIDE 5 MG: 5 TABLET ORAL at 14:41

## 2021-01-07 RX ADMIN — DEXAMETHASONE SODIUM PHOSPHATE 10 MG: 10 INJECTION INTRAMUSCULAR; INTRAVENOUS at 07:52

## 2021-01-07 RX ADMIN — METOPROLOL TARTRATE 50 MG: 50 TABLET, FILM COATED ORAL at 20:12

## 2021-01-07 RX ADMIN — FENTANYL CITRATE 50 MCG: 50 INJECTION, SOLUTION INTRAMUSCULAR; INTRAVENOUS at 09:46

## 2021-01-07 RX ADMIN — PROPOFOL 20 MG: 10 INJECTION, EMULSION INTRAVENOUS at 09:17

## 2021-01-07 RX ADMIN — Medication 5 MG: at 07:47

## 2021-01-07 RX ADMIN — THERA TABS 1 TABLET: TAB at 14:43

## 2021-01-07 RX ADMIN — ONDANSETRON 4 MG: 2 INJECTION INTRAMUSCULAR; INTRAVENOUS at 08:47

## 2021-01-07 RX ADMIN — CEFAZOLIN SODIUM 2 G: 2 SOLUTION INTRAVENOUS at 18:01

## 2021-01-07 RX ADMIN — LAMOTRIGINE 200 MG: 100 TABLET ORAL at 14:43

## 2021-01-07 RX ADMIN — DEXTROSE AND SODIUM CHLORIDE: 5; 450 INJECTION, SOLUTION INTRAVENOUS at 12:13

## 2021-01-07 RX ADMIN — ONDANSETRON 4 MG: 2 INJECTION INTRAMUSCULAR; INTRAVENOUS at 10:49

## 2021-01-07 ASSESSMENT — PULMONARY FUNCTION TESTS
PIF_VALUE: 17
PIF_VALUE: 21
PIF_VALUE: 17
PIF_VALUE: 21
PIF_VALUE: 21
PIF_VALUE: 17
PIF_VALUE: 16
PIF_VALUE: 17
PIF_VALUE: 16
PIF_VALUE: 2
PIF_VALUE: 17
PIF_VALUE: 21
PIF_VALUE: 17
PIF_VALUE: 17
PIF_VALUE: 16
PIF_VALUE: 18
PIF_VALUE: 17
PIF_VALUE: 16
PIF_VALUE: 16
PIF_VALUE: 21
PIF_VALUE: 13
PIF_VALUE: 16
PIF_VALUE: 13
PIF_VALUE: 16
PIF_VALUE: 21
PIF_VALUE: 12
PIF_VALUE: 16
PIF_VALUE: 0
PIF_VALUE: 14
PIF_VALUE: 3
PIF_VALUE: 15
PIF_VALUE: 2
PIF_VALUE: 17
PIF_VALUE: 17
PIF_VALUE: 16
PIF_VALUE: 17
PIF_VALUE: 17
PIF_VALUE: 3
PIF_VALUE: 16
PIF_VALUE: 1
PIF_VALUE: 17
PIF_VALUE: 21
PIF_VALUE: 16
PIF_VALUE: 17
PIF_VALUE: 13
PIF_VALUE: 16
PIF_VALUE: 16
PIF_VALUE: 14
PIF_VALUE: 16
PIF_VALUE: 21
PIF_VALUE: 16
PIF_VALUE: 17
PIF_VALUE: 17

## 2021-01-07 ASSESSMENT — PAIN SCALES - GENERAL
PAINLEVEL_OUTOF10: 8
PAINLEVEL_OUTOF10: 7
PAINLEVEL_OUTOF10: 7
PAINLEVEL_OUTOF10: 6
PAINLEVEL_OUTOF10: 6
PAINLEVEL_OUTOF10: 7
PAINLEVEL_OUTOF10: 7
PAINLEVEL_OUTOF10: 6
PAINLEVEL_OUTOF10: 7

## 2021-01-07 ASSESSMENT — PAIN - FUNCTIONAL ASSESSMENT: PAIN_FUNCTIONAL_ASSESSMENT: 0-10

## 2021-01-07 ASSESSMENT — PAIN DESCRIPTION - PAIN TYPE: TYPE: SURGICAL PAIN

## 2021-01-07 ASSESSMENT — PAIN DESCRIPTION - DESCRIPTORS: DESCRIPTORS: ACHING;BURNING

## 2021-01-07 ASSESSMENT — PAIN DESCRIPTION - FREQUENCY: FREQUENCY: CONTINUOUS

## 2021-01-07 ASSESSMENT — PAIN DESCRIPTION - LOCATION: LOCATION: NECK

## 2021-01-07 NOTE — PLAN OF CARE
Patient notes moderate amount of pain to posterior cervical neck and numbness and tingling to bilateral upper extremities that was present prior to surgery. He notes that the numbness and tingling is somewhat better. He has good equal strength to bilateral upper extremities. He is having a moderate amount of nausea with no emesis. He notes that this is not atypical for him after anesthesia. Plan is for nausea control, pain control and discharge planning. The patient's plan is to go home tomorrow if he is able to.

## 2021-01-07 NOTE — ANESTHESIA POSTPROCEDURE EVALUATION
Department of Anesthesiology  Postprocedure Note    Patient: Danielle Perez  MRN: [id number]  YOB: 1970  Date of evaluation: 1/7/2021  Time:  9:27 AM     Procedure Summary     Date: 01/07/21 Room / Location: [facility] 01 / [facility]    Anesthesia Start: 2835 Anesthesia Stop: 9408    Procedure: POSTERIOR CERVICAL   DECOMPRESSION, LAMINOPLASTY AT C3, C4, AND C5 (N/A ) Diagnosis: (STENOSIS, RADICULOPATHY DISC HERNIATION, WEAKNESS)    Surgeons: Margarita Downs MD Responsible Provider: Jessy Shanks MD    Anesthesia Type: general ASA Status: 3          Anesthesia Type: general    Nafisa Phase I:      Nafisa Phase II:      Last vitals: Reviewed and per EMR flowsheets.        Anesthesia Post Evaluation    Patient location during evaluation: PACU  Patient participation: complete - patient participated  Level of consciousness: awake  Pain score: 0  Airway patency: patent  Nausea & Vomiting: no nausea and no vomiting  Complications: no  Cardiovascular status: hemodynamically stable  Respiratory status: acceptable  Hydration status: euvolemic

## 2021-01-07 NOTE — OP NOTE
Operative Note  ______________________________________________________________    Patient: Brett Nageotte  YOB: 1970  MRN: [id number]  Date of Procedure: 1/7/2021    Pre-Op Diagnosis: STENOSIS, RADICULOPATHY DISC HERNIATION, WEAKNESS    Post-Op Diagnosis: Same       Procedure(s):  POSTERIOR CERVICAL   DECOMPRESSION, LAMINOPLASTY AT C3, C4, AND C5    #1  Posterior cervical decompression C3, C4, and C5, #2 laminoplasty C3, C4, and C5, #3 use of SSEP      Anesthesia: General    Surgeon(s):  Gregor Qiu MD    Staff:    First Assistant: Logan Salinas  Scrub Person First: Grace Brizuela    Estimated Blood Loss: 30 ML          Implants:  Implant Name Type Inv. Item Serial No.  Lot No. LRB No. Used Action   PLATE SPNL M [id number] TRAD CC FOR LAMINOPLASTY FIX SYS LEVERAGE  PLATE SPNL M [id number] TRAD CC FOR LAMINOPLASTY FIX SYS LEVERAGE  [employer]  N/A 3 Implanted   SCREW SPNL L5MM DIA2. 6MM ROB LEVERAGE LFS  SCREW SPNL L5MM DIA2. 6MM ROB LEVERAGE LFS  [employer]  N/A 3 Implanted   SCREW SPNL L7MM DIA2. 6MM LUM LAT MASS LEVERAGE LFS  SCREW SPNL L7MM DIA2. 6MM LUM LAT MASS LEVERAGE LFS  [employer]  N/A 3 Implanted             Procedure:  Patient is a 52-year-old gentleman with cervical spinal stenosis with evidence of myelopathy for which patient admitted for posterior cervical decompression laminoplasty. Stenosis a history of previous anterior cervical discectomy and fusion as well. He was given preoperative antibiotics and Decadron Intra-Op. Teds and compression boots were applied to prevent Intra-Op and postop DVTs. Antibiotic is to be DC'd in 24 hours. After the intubation, he was carefully positioned prone on the OR table. Neck and pressure points are carefully inspected and protected. Shoulder and groin rolls are used for abdominal decompression. Facial structures were protected with a horseshoe. Back of neck was then cleaned with ChloraPrep and draped sterilely.   Midline incision was then made and careful subperiosteal dissection was performed to expose lamina from C2-C6. After the confirmation anatomically, retractor blades were applied. And decompression was performed at C3 C4-C5 using open-door technique by creating a trough at the lamina facet junction at each level. Door hinge was on the right side and opening was on the left. Intervening ligamentum flavum's were resected to further aid with the central decompression. At this point using [employer] system, laminoplasty was performed at C3-C4 and C5, in routine fashion. Hemostasis was satisfactory. Frequent antibiotic irrigations applied. Paraspinal muscle and fascia were closed with 0 Vicryl's. In 203 0 Vicryl subcutaneous layers and staples to skin. No changes in SSEP was noted.     Jocelyn Armstrong MD   on 1/7/21 at 9:11 AM EST

## 2021-01-07 NOTE — ANESTHESIA PRE PROCEDURE
Department of Anesthesiology  Preprocedure Note       Name:  Anyi Boles   Age:  53 y.o.  :  1970                                          MRN:  [id number]         Date:  2021      Surgeon: Aleyda Gerard):  Eduardo Marley MD    Procedure: Procedure(s):  POSTERIOR CERVICAL   DECOMPRESSION, LAMINOPLASTY AT C3, C4, AND C5 1.5 HOURS/ 1 C-ARM/ SSEP/ NUVASIVE/ PINA FRAME WITH HORSESHOE (PAT AT [facility]) [facility] ALLERGY  [address]    Medications prior to admission:   Prior to Admission medications    Medication Sig Start Date End Date Taking? Authorizing Provider   LORazepam (ATIVAN) 1 MG tablet Take 1 mg by mouth daily as needed. 20 Yes Historical Provider, MD   baclofen (LIORESAL) 20 MG tablet Take by mouth 4 times daily   Yes Historical Provider, MD   colestipol (COLESTID) 1 g tablet Take 1 g by mouth daily   Yes Historical Provider, MD   escitalopram (LEXAPRO) 10 MG tablet Take 10 mg by mouth daily   Yes Historical Provider, MD   lamoTRIgine (LAMICTAL) 200 MG tablet Take 200 mg by mouth daily 12/16/20 1/15/21 Yes Historical Provider, MD   chlordiazePOXIDE-Clidinium (LIBRAX PO) Take 1 tablet by mouth 3 times daily   Yes Historical Provider, MD   ascorbic acid (VITAMIN C) 500 MG tablet Take 500 mg by mouth daily   Yes Historical Provider, MD   Copper Gluconate 2 MG TABS Take 5 mg by mouth daily 20  Yes Historical Provider, MD   pregabalin (LYRICA) 150 MG capsule Take 75 mg by mouth 2 times daily. 20  Yes Historical Provider, MD   metoprolol tartrate (LOPRESSOR) 50 MG tablet Take 50 mg by mouth 2 times daily   Yes Historical Provider, MD   oxyCODONE-acetaminophen (PERCOCET) 5-325 MG per tablet Take 2 tablets by mouth every 6 hours.    Yes Historical Provider, MD   ondansetron (ZOFRAN) 8 MG tablet Take 8 mg by mouth every 6 hours as needed for Nausea or Vomiting   Yes Historical Provider, MD   midodrine (PROAMATINE) 5 MG tablet Take 5 mg by mouth 3 times daily   Yes Historical Provider, I49.8    Pain due to total right knee replacement ([facility] [address] T84.84XA, Z96.651    Encounter for dietary counseling and surveillance Z71.3    Postlaminectomy syndrome of lumbar region M96.1    Intra-abdominal infection B99.9    IVCD (intraventricular conduction defect) I45.4    Other reduced mobility Z74.09    Asthma J45.909    Depressive disorder F32.9    Deficiency of macronutrients E63.9    Morbid obesity (HCC) E66.01    Weight loss R63.4    Near syncope R55    Cervical disc disorder with radiculopathy M50.10       Past Medical History:        Diagnosis Date    Arthritis     Back pain     Depression     Diabetes mellitus ([facility] [address]     5/15/2020: pt states DM is resolved    Fibromyalgia     Gastric ulcer 2016    H/O thyroid disease     History of removal of joint prosthesis of right knee due to infection 2019    Intra-abdominal infection 2016    Neuropathy     Restless leg syndrome     S/P insertion of spinal cord stimulator     Urinary incontinence        Past Surgical History:        Procedure Laterality Date    CARPAL TUNNEL RELEASE      CERVICAL FUSION N/A 5/21/2020    ACDF C 5-6, C 6-7 performed by Dora Carver MD at [address] [place] [address] GASTRIC BYPASS  2014   [address] [place] [address]    SPINE SURGERY  2000       Social History:    Social History     Tobacco Use    Smoking status: Never Smoker    Smokeless tobacco: Never Used   Substance Use Topics    Alcohol use: Not Currently                                Counseling given: Not Answered      Vital Signs (Current):   Vitals:    01/07/21 0600   BP: 127/65   Pulse: (!) 46   Resp: 16   Temp: 97.7 °F (36.5 °C)   TempSrc: Temporal   SpO2: 97%   Weight: 174 lb (78.9 kg)   Height: 6' 2\" (1.88 m)                                              BP Readings from Last 3 Encounters:   01/07/21 127/65   12/24/20 108/66   05/22/20 120/64       NPO Status: Time of last liquid consumption: 2300 Time of last solid consumption: 1930                        Date of last liquid consumption: 01/06/21                        Date of last solid food consumption: 01/06/21    BMI:   Wt Readings from Last 3 Encounters:   01/07/21 174 lb (78.9 kg)   12/24/20 174 lb (78.9 kg)   12/04/20 178 lb (80.7 kg)     Body mass index is 22.34 kg/m². CBC:   Lab Results   Component Value Date    WBC 6.3 12/31/2020    RBC 4.07 12/31/2020    HGB 11.9 12/31/2020    HCT 36.8 12/31/2020    MCV 90.4 12/31/2020    RDW 14.1 12/31/2020     12/31/2020       CMP:   Lab Results   Component Value Date     12/31/2020    K 4.6 12/31/2020     12/31/2020    CO2 32 12/31/2020    BUN 19 12/31/2020    CREATININE 0.72 12/31/2020    GFRAA >60.0 12/31/2020    LABGLOM >60.0 12/31/2020    GLUCOSE 91 12/31/2020    CALCIUM 8.9 12/31/2020       POC Tests: No results for input(s): POCGLU, POCNA, POCK, POCCL, POCBUN, POCHEMO, POCHCT in the last 72 hours. Coags:   Lab Results   Component Value Date    PROTIME 13.4 12/31/2020    INR 1.0 12/31/2020    APTT 36.9 12/31/2020       HCG (If Applicable): No results found for: PREGTESTUR, PREGSERUM, HCG, HCGQUANT     ABGs: No results found for: PHART, PO2ART, AXN1EKR, SYY1WHE, BEART, Z9QVCXOW     Type & Screen (If Applicable):  No results found for: LABABO, LABRH    Drug/Infectious Status (If Applicable):  No results found for: HIV, HEPCAB    COVID-19 Screening (If Applicable):   Lab Results   Component Value Date    COVID19 Not Detected 12/31/2020         Anesthesia Evaluation     history of anesthetic complications: PONV.   Airway: Mallampati: II  TM distance: >3 FB   Neck ROM: limited  Mouth opening: > = 3 FB Dental: normal exam         Pulmonary: breath sounds clear to auscultation  (+) sleep apnea:  asthma:                            Cardiovascular:Negative CV ROS          ECG reviewed  Rhythm: regular                      Neuro/Psych:   (+) neuromuscular disease:, psychiatric history:             ROS comment: Cervical radiculopathy GI/Hepatic/Renal:   (+) PUD,           Endo/Other:    (+) DiabetesType II DM, well controlled, , hypothyroidism::., .                 Abdominal:           Vascular: negative vascular ROS. Anesthesia Plan      general     ASA 3     (PONV prophylaxis; scop patch  Remifentanil drip  Glidescope for intubation with in line immobilization of the neck)  Induction: intravenous. MIPS: Postoperative opioids intended and Prophylactic antiemetics administered. Anesthetic plan and risks discussed with patient. Use of blood products discussed with patient whom consented to blood products. Plan discussed with CRNA.     Attending anesthesiologist reviewed and agrees with Pre Eval content              Ryley France MD   1/7/2021

## 2021-01-07 NOTE — BRIEF OP NOTE
Brief Postoperative Note      Patient: Sal  YOB: 1970  MRN: [id number]    Date of Procedure: 1/7/2021    Pre-Op Diagnosis: STENOSIS, RADICULOPATHY DISC HERNIATION, WEAKNESS    Post-Op Diagnosis: Same       Procedure(s):  POSTERIOR CERVICAL   DECOMPRESSION, LAMINOPLASTY AT C3, C4, AND C5    Surgeon(s):  Frances Banks MD    Assistant:  First Assistant: Silver Alatorre    Anesthesia: General    Estimated Blood Loss (mL): less than 50     Complications: None    Specimens:   * No specimens in log *    Implants:  Implant Name Type Inv. Item Serial No.  Lot No. LRB No. Used Action   PLATE SPNL M [id number] TRAD CC FOR LAMINOPLASTY FIX SYS LEVERAGE  PLATE SPNL M [id number] TRAD CC FOR LAMINOPLASTY FIX SYS LEVERAGE  [employer]  N/A 3 Implanted   SCREW SPNL L5MM DIA2. 6MM ROB LEVERAGE LFS  SCREW SPNL L5MM DIA2. 6MM ROB LEVERAGE LFS  [employer]  N/A 3 Implanted   SCREW SPNL L7MM DIA2. 6MM LUM LAT MASS LEVERAGE LFS  SCREW SPNL L7MM DIA2. 6MM LUM LAT MASS LEVERAGE LFS  [employer]  N/A 3 Implanted         Electronically signed by Lj Pope MD on 1/7/2021 at 9:01 AM

## 2021-01-08 VITALS
BODY MASS INDEX: 22.33 KG/M2 | DIASTOLIC BLOOD PRESSURE: 66 MMHG | OXYGEN SATURATION: 100 % | TEMPERATURE: 97.7 F | SYSTOLIC BLOOD PRESSURE: 110 MMHG | HEART RATE: 54 BPM | HEIGHT: 74 IN | RESPIRATION RATE: 16 BRPM | WEIGHT: 174 LBS

## 2021-01-08 PROCEDURE — 97166 OT EVAL MOD COMPLEX 45 MIN: CPT

## 2021-01-08 PROCEDURE — 2580000003 HC RX 258: Performed by: NEUROLOGICAL SURGERY

## 2021-01-08 PROCEDURE — 6370000000 HC RX 637 (ALT 250 FOR IP): Performed by: NEUROLOGICAL SURGERY

## 2021-01-08 PROCEDURE — 97162 PT EVAL MOD COMPLEX 30 MIN: CPT

## 2021-01-08 PROCEDURE — 6360000002 HC RX W HCPCS: Performed by: NEUROLOGICAL SURGERY

## 2021-01-08 RX ORDER — OXYCODONE AND ACETAMINOPHEN 10; 325 MG/1; MG/1
1 TABLET ORAL EVERY 4 HOURS PRN
Qty: 42 TABLET | Refills: 0 | Status: SHIPPED | OUTPATIENT
Start: 2021-01-08 | End: 2021-01-20 | Stop reason: SDUPTHER

## 2021-01-08 RX ORDER — CEPHALEXIN 500 MG/1
500 CAPSULE ORAL 3 TIMES DAILY
Qty: 20 CAPSULE | Refills: 0 | Status: SHIPPED | OUTPATIENT
Start: 2021-01-08 | End: 2021-01-15

## 2021-01-08 RX ADMIN — PREGABALIN 75 MG: 25 CAPSULE ORAL at 09:57

## 2021-01-08 RX ADMIN — LAMOTRIGINE 200 MG: 100 TABLET ORAL at 09:58

## 2021-01-08 RX ADMIN — OXYCODONE HYDROCHLORIDE AND ACETAMINOPHEN 1 TABLET: 10; 325 TABLET ORAL at 09:56

## 2021-01-08 RX ADMIN — CEPHALEXIN 500 MG: 500 CAPSULE ORAL at 05:19

## 2021-01-08 RX ADMIN — METOPROLOL TARTRATE 50 MG: 50 TABLET, FILM COATED ORAL at 09:59

## 2021-01-08 RX ADMIN — CEPHALEXIN 500 MG: 500 CAPSULE ORAL at 13:38

## 2021-01-08 RX ADMIN — LEVOTHYROXINE SODIUM 100 MCG: 0.1 TABLET ORAL at 09:59

## 2021-01-08 RX ADMIN — Medication 10 ML: at 10:01

## 2021-01-08 RX ADMIN — ONDANSETRON 4 MG: 2 INJECTION INTRAMUSCULAR; INTRAVENOUS at 02:46

## 2021-01-08 RX ADMIN — ESCITALOPRAM OXALATE 10 MG: 10 TABLET ORAL at 09:59

## 2021-01-08 RX ADMIN — CEFAZOLIN SODIUM 2 G: 2 SOLUTION INTRAVENOUS at 01:16

## 2021-01-08 RX ADMIN — HYDROMORPHONE HYDROCHLORIDE 0.5 MG: 1 INJECTION, SOLUTION INTRAMUSCULAR; INTRAVENOUS; SUBCUTANEOUS at 02:46

## 2021-01-08 RX ADMIN — BACLOFEN 20 MG: 20 TABLET ORAL at 13:38

## 2021-01-08 RX ADMIN — THERA TABS 1 TABLET: TAB at 10:00

## 2021-01-08 RX ADMIN — BACLOFEN 20 MG: 20 TABLET ORAL at 09:58

## 2021-01-08 RX ADMIN — MIDODRINE HYDROCHLORIDE 5 MG: 5 TABLET ORAL at 13:38

## 2021-01-08 RX ADMIN — LORAZEPAM 1 MG: 1 TABLET ORAL at 11:46

## 2021-01-08 RX ADMIN — OXYCODONE HYDROCHLORIDE AND ACETAMINOPHEN 1 TABLET: 10; 325 TABLET ORAL at 05:22

## 2021-01-08 RX ADMIN — MIDODRINE HYDROCHLORIDE 5 MG: 5 TABLET ORAL at 09:57

## 2021-01-08 ASSESSMENT — PAIN DESCRIPTION - FREQUENCY: FREQUENCY: CONTINUOUS

## 2021-01-08 ASSESSMENT — PAIN DESCRIPTION - ORIENTATION: ORIENTATION: POSTERIOR

## 2021-01-08 ASSESSMENT — PAIN SCALES - GENERAL
PAINLEVEL_OUTOF10: 6
PAINLEVEL_OUTOF10: 6

## 2021-01-08 ASSESSMENT — PAIN DESCRIPTION - DESCRIPTORS: DESCRIPTORS: ACHING

## 2021-01-08 ASSESSMENT — PAIN DESCRIPTION - PAIN TYPE: TYPE: SURGICAL PAIN

## 2021-01-08 NOTE — DISCHARGE INSTR - COC
Continuity of Care Form    Patient Name: Todd Saldivar   :  1970  MRN:  [id number]    Admit date:  2021  Discharge date:  ***    Code Status Order: Full Code   Advance Directives:   Advance Care Flowsheet Documentation     Date/Time Healthcare Directive Type of Healthcare Directive Copy in [address] Agent's Name Healthcare Agent's Phone Number    21 1234  Yes, patient has an advance directive for healthcare treatment  Living will;Durable power of  for health care  No, copy requested from family  Spouse  aden vasquez  --    21 0654  Yes, patient has an advance directive for healthcare treatment  --  --  --  --  --          Admitting Physician:  Shiela Spivey MD  PCP: Rachel Zurita    Discharging Nurse: [facility] Unit/Room#: W593/I388-61  Discharging Unit Phone Number: ***    Emergency Contact:   Extended Emergency Contact Information  Primary Emergency Contact: Ever Lesch, [address] [place] [address] Phone: [phone]  Mobile Phone: [phone]  Relation: Spouse  Preferred language: English  Secondary Emergency Contact: Ever Lesch, [facility] Phone: [phone]  Mobile Phone: [phone]  Relation: Parent    Past Surgical History:  Past Surgical History:   Procedure Laterality Date    CARPAL TUNNEL RELEASE      CERVICAL FUSION N/A 2020    ACDF C 5-6, C 6-7 performed by Shiela Spivey MD at [facility] 2021    POSTERIOR CERVICAL   DECOMPRESSION, LAMINOPLASTY AT C3, C4, AND C5 performed by Shiela Spivey MD at [address]     [address] [place] [address]  [facility]       Immunization History:   Immunization History   Administered Date(s) Administered    Influenza, MDCK Quadv, with preserv IM (Flucelvax 4 yrs and older) 2019    Influenza, MDCK, Preservative free 10/25/2017    Influenza, Quadv, IM, PF (6 mo and older Fluzone, Flulaval, Fluarix, and 3 yrs and older Afluria) 09/26/2015    Pneumococcal Polysaccharide ([id number]) 06/28/2014       Active Problems:  Patient Active Problem List   Diagnosis Code    Lumbago M54.5    Anemia D64.9    Obesity E66.9    Chronic pain G89.29    Closed fracture of femur, distal end ([facility] S72.409A    H/O bariatric surgery Z98.84    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) T84.53XA    S/P revision of total knee, right Z96.651    JABARI (obstructive sleep apnea) G47.33    Gastric ulcer K25.9    History of anticoagulant therapy Z92.29    Hypothyroidism E03.9    Postural orthostatic tachycardia syndrome I49.8    Pain due to total right knee replacement ([facility] T84.84XA, Z96.651    Encounter for dietary counseling and surveillance Z71.3    Postlaminectomy syndrome of lumbar region M96.1    Intra-abdominal infection B99.9    IVCD (intraventricular conduction defect) I45.4    Other reduced mobility Z74.09    Asthma J45.909    Depressive disorder F32.9    Deficiency of macronutrients E63.9    Morbid obesity (HCC) E66.01    Weight loss R63.4    Near syncope R55    Cervical disc disorder with radiculopathy M50.10    Cervical cord compression with myelopathy ([facility]) G95.20       Isolation/Infection:   Isolation          No Isolation        Patient Infection Status     Infection Onset Added Last Indicated Last Indicated By Review Planned Expiration Resolved Resolved By    None active    Resolved    COVID-19 Rule Out 12/31/20 12/31/20 12/31/20 COVID-19 Ambulatory (Ordered)   01/01/21 Rule-Out Test Resulted          Nurse Assessment:  Last Vital Signs: /66   Pulse 54   Temp 97.7 °F (36.5 °C) (Oral)   Resp 16   Ht 6' 2\" (1.88 m)   Wt 174 lb (78.9 kg)   SpO2 100%   BMI 22.34 kg/m²     Last documented pain score (0-10 scale): Pain Level: 7  Last Weight:   Wt Readings from Last 1 Encounters:   01/07/21 174 lb (78.9 kg)     Mental Status:  {IP PT MENTAL STATUS:00308}    IV Access:  [address] IV ACCESS:717008289}    Nursing Mobility/ADLs:  Walking   {CHP DME VTLK:321496513}  Transfer  {CHP DME DTQS:502349935}  Bathing  {CHP DME QWVH:361673082}  Dressing  {CHP DME DJZJ:292688769}  Toileting  {CHP DME SHKS:739096993}  Feeding  {CHP DME FNGN:099875792}  Med Admin  {P DME LFUL:207778779}  Med Delivery   {[facility] MED Delivery:909164712}    Wound Care Documentation and Therapy:        Elimination:  Continence:   · Bowel: {YES / FM:44282}  · Bladder: {YES / ND:04104}  Urinary Catheter: {Urinary Catheter:022071039}   Colostomy/Ileostomy/Ileal Conduit: {YES / QA:61537}       Date of Last BM: ***    Intake/Output Summary (Last 24 hours) at 2021 1204  Last data filed at 2021 0246  Gross per 24 hour   Intake --   Output 800 ml   Net -800 ml     I/O last 3 completed shifts:   In: 1800 [I.V.:1800]  Out: 1 [Urine:800; Blood:30]    Safety Concerns:     [address] Safety Concerns:848650254}    Impairments/Disabilities:      [address] Impairments/Disabilities:386877781}    Nutrition Therapy:  Current Nutrition Therapy:   [address] Diet List:203412709}    Routes of Feeding: {[facility] DME Other Feedings:224464477}  Liquids: {Slp liquid thickness:88133}  Daily Fluid Restriction: {CHP DME Yes amt example:326382271}  Last Modified Barium Swallow with Video (Video Swallowing Test): {Done Not Done UNTC:138892622}    Treatments at the Time of Hospital Discharge:   Respiratory Treatments: ***  Oxygen Therapy:  {Therapy; copd oxygen:53345}  Ventilator:    {[facility] Vent IDYU:392841385}    Rehab Therapies: {THERAPEUTIC INTERVENTION:5739985852}  Weight Bearing Status/Restrictions: [address]  Weight Bearin}  Other Medical Equipment (for information only, NOT a DME order):  {EQUIPMENT:545201658}  Other Treatments: ***    Patient's personal belongings (please select all that are sent with patient):  {[facility] DME Belongings:199893044}    RN SIGNATURE:  {Esignature:336180089}    CASE MANAGEMENT/SOCIAL WORK SECTION      Readmission Risk Assessment Score:  Readmission Risk              Risk of Unplanned Readmission:        0           Discharging to Facility/ Agency   · Name: [facility]. PT/OT/SN  · Address:  · Phone:  · Fax:    Dialysis Facility (if applicable)   · Name:  · Address:  · Dialysis Schedule:  · Phone:  · Fax:    / signature: {Esignature:085675097}    PHYSICIAN SECTION    Prognosis: {Prognosis:7881622851}    Condition at Discharge: [address] [place] [address] Patient Condition:979459898}    Rehab Potential (if transferring to Rehab): {Prognosis:4498192532}    Recommended Labs or Other Treatments After Discharge: ***    Physician Certification: I certify the above information and transfer of Randy Ruiz  is necessary for the continuing treatment of the diagnosis listed and that he requires {Admit to Appropriate Level of Care:57266} for {GREATER/LESS:845884068} 30 days.      Update Admission H&P: {CHP DME Changes in COWJU:274463812}    PHYSICIAN SIGNATURE:  {Esignature:443944147}

## 2021-01-08 NOTE — PROGRESS NOTES
[facility] OCCUPATIONAL THERAPY EVALUATION - ACUTE     NAME: Brett Nageotte  : 1970 (53 y.o.)  MRN: [id number]  CODE STATUS: Full Code  Room: I667/Y374-22    Date of Service: 2021    Patient Diagnosis(es): Cervical cord compression with myelopathy ([facility] [address] [G95.20]   No chief complaint on file.     Patient Active Problem List    Diagnosis Date Noted    Cervical cord compression with myelopathy ([address] 2021    Cervical disc disorder with radiculopathy 2020    Anemia 2020    Chronic pain 2020    Closed fracture of femur, distal end ([address] 2020    JABARI (obstructive sleep apnea) 2020    History of anticoagulant therapy 2020    Hypothyroidism 2020    Other reduced mobility 2020    Depressive disorder 2020    Infection of prosthetic right knee joint ([address] 06/10/2019    S/P revision of total knee, right 06/10/2019    Pain due to total right knee replacement ([address] 2018    H/O bariatric surgery 2016    Intra-abdominal infection 2016    Deficiency of macronutrients 2016    Morbid obesity ([address] 2016    Gastric ulcer 2016    Encounter for dietary counseling and surveillance 2014    IVCD (intraventricular conduction defect) 10/29/2011    Asthma 10/29/2011    Weight loss 10/29/2011    Near syncope 10/29/2011    Postural orthostatic tachycardia syndrome 10/14/2011    Obesity 2010    Lumbago 02/10/2004    Postlaminectomy syndrome of lumbar region 02/10/2004        Past Medical History:   Diagnosis Date    Arthritis     Back pain     Depression     Diabetes mellitus ([address]     5/15/2020: pt states DM is resolved    Fibromyalgia     Gastric ulcer     H/O thyroid disease     History of removal of joint prosthesis of right knee due to infection     Intra-abdominal infection     Neuropathy     Restless leg syndrome     S/P insertion of spinal cord stimulator     Urinary incontinence      Past Surgical History:   Procedure Laterality Date    CARPAL TUNNEL RELEASE      CERVICAL FUSION N/A 5/21/2020    ACDF C 5-6, C 6-7 [facility]    POSTERIOR CERVICAL   DECOMPRESSION, LAMINOPLASTY AT C3, C4, AND C5 performed by Michelle Stahl MD at [facility] GASTRIC BYPASS  2014    R Almas sa[address] [place] [address] SPINE SURGERY  2000        Restrictions  Restrictions/Precautions: Fall Risk     Safety Devices: Safety Devices  Safety Devices in place: Yes  Type of devices: All fall risk precautions in place        Subjective  Pre Treatment Pain Screening  Pain at present: 6  Scale Used: Numeric Score  Intervention List: Patient able to continue with treatment, Patient declined any intervention    Pain Reassessment:   Pain Assessment  Patient Currently in Pain: Yes  Pain Assessment: 0-10  Pain Level: 6  Pain Type: Surgical pain  Pain Location: Neck  Pain Descriptors: Aching       Prior Level of Function:  Social/Functional History  Lives With: Spouse(child)  Type of Home: House  Home Layout: Two level, 1/2 bath on main level(master bath on lower level with 5 steps with handrail; 11 steps to upper level to full bath)  Home Access: Stairs to enter without rails  Entrance Stairs - Number of Steps: 1  Bathroom Shower/Tub: Tub/Shower unit  Bathroom Equipment: Shower chair  Home Equipment: Rolling walker(knee immobilizer when ambulating)  ADL Assistance: Independent(does not shower when alone)  Homemaking Responsibilities: Yes(shared)  Ambulation Assistance: Independent(cane)  Transfer Assistance: Independent  Active : No  Occupation: On disability    OBJECTIVE:     Orientation Status:  Orientation  Overall Orientation Status: Within Functional Limits    Observation:  Observation/Palpation  Posture: Good  Observation: Pt. alert and attentive, pleasant.  C-collar on and fitted appropriately    Cognition Status:  Cognition  Overall Cognitive Status: WFL    Perception Status:  Perception  Overall Perceptual Status: WFL    Sensation Status:  Sensation  Overall Sensation Status: Impaired(numbness/tingling to L>R UE)    Vision and Hearing Status:  Vision  Vision: Within Functional Limits  Hearing  Hearing: Exceptions to [facility]  Hearing Exceptions: (\"my hearing plugs\")     ROM:   LUE AROM (degrees)  LUE AROM : WFL  Left Hand AROM (degrees)  Left Hand AROM: WFL  RUE AROM (degrees)  RUE AROM : WFL  Right Hand AROM (degrees)  Right Hand AROM: WFL    Strength:  LUE Strength  Gross LUE Strength: Exceptions to [facility]  L Hand General: 3+/5  LUE Strength Comment: 3+/5 grossly; DNT formally d/t cervical precautions  RUE Strength  Gross RUE Strength: Exceptions to [facility]  R Hand General: 3+/5  RUE Strength Comment: 3+/5 grossly; DNT formally d/t cervical precautions    Coordination, Tone, Quality of Movement: Tone RUE  RUE Tone: Normotonic  Tone LUE  LUE Tone: Normotonic  Coordination  Movements Are Fluid And Coordinated: No  Coordination and Movement description: Fine motor impairments, Decreased speed, Decreased accuracy, Left UE  Quality of Movement Other  Comment: Pt. with increased time for opening containers    Hand Dominance:  Hand Dominance  Hand Dominance: Right    ADL Status:  ADL  Feeding: Independent  Grooming: Modified independent   UE Bathing: Modified independent   LE Bathing: Supervision  UE Dressing: Setup  LE Dressing: Supervision  Toileting: Supervision  Additional Comments: Simulated ADLs as above. Pt. with mild balance deficits and decreased endurance. Requires increased time for mobility and SBA d/t decreased static standing tolerance  Toilet Transfers  Toilet Transfer: Unable to assess  Toilet Transfers Comments: Anticipate SBA. Discussed toilet safety frame with pt; he reports he has one but it did not fit with the new vanity in his bathroom.  Discussed checking to see if there is an option to place only one arm of frame to improve hand positioning for transfers; pt. states he will ask his wife to check       Therapy key for assistance levels -   Independent = Pt. is able to perform task with no assistance but may require a device   Stand by assistance = Pt. does not perform task at an independent level but does not need physical assistance, requires verbal cues  Minimal, Moderate, Maximal Assistance = Pt. requires physical assistance (25%, 50%, 75% assist from helper) for task but is able to actively participate in task   Dependent = Pt. requires total assistance with task and is not able to actively participate with task completion     Functional Mobility:  Functional Mobility  Functional - Mobility Device: Rolling Walker(Cane and WW trialled)  Activity: Other(To/from door)  Assist Level: Contact guard assistance  Functional Mobility Comments: CGA progressing to SBA; CGA with cane d/t decreased balance. Transfers  Sit to stand: Stand by assistance  Stand to sit: Stand by assistance  Transfer Comments: SBA with increased time, verbal cues to locate chair arms when sitting    Bed Mobility  Bed mobility  Supine to Sit: Stand by assistance  Sit to Supine: Stand by assistance  Comment: HOB elevated, increased time for mobility    Seated and Standing Balance:  Balance  Sitting Balance: Supervision  Standing Balance: Stand by assistance    Functional Endurance:  Activity Tolerance  Activity Tolerance: Patient Tolerated treatment well    D/C Recommendations:  OT D/C RECOMMENDATIONS  REQUIRES OT FOLLOW UP: Yes    Equipment Recommendations:  OT Equipment Recommendations  Other: Continue to assess    OT Education:   OT Education  OT Education: OT Role, Plan of Care  Patient Education: Educated pt.  on changing pads for c-collar  Barriers to Learning: None    OT Follow Up:  OT D/C RECOMMENDATIONS  REQUIRES OT FOLLOW UP: Yes       Assessment/Discharge Disposition:  Assessment: Pt is a 53year old man from home with spouse who presents to [facility] with the above deficits which impact his ability to engage in ADLs and IADLs. Pt. limited by decreased strength, endurance and balance. Pt. would benefit from continued OT to maximize independence and safety with ADL tasks.   Performance deficits / Impairments: Decreased functional mobility , Decreased ADL status, Decreased strength, Decreased sensation, Decreased endurance, Decreased balance, Decreased high-level IADLs, Decreased fine motor control, Decreased coordination  Prognosis: Good  Discharge Recommendations: Continue to assess pending progress  Decision Making: Medium Complexity  History: Pt's medical history is moderately complex  Exam: Pt. has 9 performance deficits  Assistance / Modification: Pt. requires SBA-min    Six Click Score   How much help for putting on and taking off regular lower body clothing?: A Little  How much help for Bathing?: A Little  How much help for Toileting?: A Little  How much help for putting on and taking off regular upper body clothing?: A Little  How much help for taking care of personal grooming?: A Little  How much help for eating meals?: None  AM-[facility] Inpatient Daily Activity Raw Score: 19  AM-[facility] Inpatient ADL T-Scale Score : 40.22  ADL Inpatient CMS 0-100% Score: 42.8    Plan:  Plan  Times per week: 1-3x  Plan weeks: Length of acute stay  Current Treatment Recommendations: Strengthening, Balance Training, Functional Mobility Training, Endurance Training, Pain Management, Safety Education & Training, Self-Care / ADL, Patient/Caregiver Education & Training, Home Management Training, Neuromuscular Re-education    Goals:   Patient will:    - Improve functional endurance to tolerate/complete 30 mins of ADL's  - Be Mod I in UB ADLs   - Be Mod I in LB ADLs  - Be Mod I in ADL transfers without LOB  - Be Mod I in toileting tasks  - Improve B hand fine motor coordination to [facility] in order to manage clothing fasteners/self-care containers in a timely manner  - Improve B UE strength and endurance to 4-/5 in order to participate in self-care activities as projected. - Access appropriate D/C site with as few architectural barriers as possible. - Sequence self-care tasks with no verbal cues for safety    Patient Goal: Patient goals : \"I want to get home\"     Discussed and agreed upon: Yes Comments:     Therapy Time:   OT Individual Minutes  Time In: 0848  Time Out: 0910  Minutes: 22    Eval: 22 minutes     Electronically signed by:     ALTAGRACIA Hager  1/8/2021, 9:30 AM

## 2021-01-08 NOTE — DISCHARGE SUMMARY
Discharge summary  This patient Memory Crumble was admitted to the hospital on 1/7/2021  after undergoing Procedure(s) (LRB):  POSTERIOR CERVICAL   DECOMPRESSION, LAMINOPLASTY AT C3, C4, AND C5 (N/A) without complications that morning. Hospital course  Patient tolerated surgical procedure well and there was no complications. Patient progressed adequtly through their recovery during hospital stay including PT and rehabilitation. Patient was then D/C on  to   in stable condition. Home  Patient was instructed on the use of pain medications, the signs and symptoms of infection, and was given our number to call should they have any questions or concerns following discharge. Patient recently received 240 tan;ets pf 5 mg percocet and pharmacy unable to fill the new prescription for 10 mg. Patient advised to take two tablets of his 5 mg percocet every 4 hours at home and not to take any more than that due to risk for overdose and death. Patient verbalized understanding.

## 2021-01-08 NOTE — PROGRESS NOTES
Postop day 1 posterior cervical decompression, laminoplasty at C3, C4 and C5. Patient notes that he had a rough night due to pain at the posterior cervical incision site. He notes the pain to be intermittent that is exacerbated by movement and relieved by pain medication and rest.  He was having some episodes of nausea without emesis yesterday which have resolved today. Patient still experiencing some numbness and tingling to bilateral upper extremities however notes that it is better than it was prior to surgery. He has good strength in bilateral upper extremities. Hand grasps are equal.    Patient surgical incision clean dry with no signs of infection, no erythema, edema or drainage noted. And educated on dressing changes, when to shower, and signs of infection to watch for. Patient verbalizes understanding. Patient's home-going prescriptions for oxycodone and Keflex have been sent electronically to the pharmacy. Plan is for the patient to discharge home this afternoon. Patient will need to follow-up with Dr. Charley Ruiz in the office in 2 weeks. Patient is to use ice bags 10 minutes on 10 minutes off as needed basis  Patient may shower in 5 days postop.     Patient is to ambulate as much as possible    Patient is to not drive until follow up

## 2021-01-08 NOTE — PROGRESS NOTES
Assessment completed, patient complaining of severe pain in the posterior neck, 7/10 on scale, lungs clear, room air, heart rate regular, bowel sounds active, last bm 1-6, posterior neck dressing clean, dry and intact, ice pack in place, patient complaining of bilateral arms with tingling and numbness left > than right and bilateral legs with tingling and numbness right greater than left, strong pulses noted in all extremity, the patient states the numbness and tingling are not new for him

## 2021-01-08 NOTE — PROGRESS NOTES
Physical Therapy Med Surg Initial Assessment  Facility/Department: [facility]  Room: [place] [address]       NAME: Pamela Avila  : 1970 (53 y.o.)  MRN: [id number]  CODE STATUS: Full Code    Date of Service: 2021    Patient Diagnosis(es): Cervical cord compression with myelopathy ([facility] [G95.20]   No chief complaint on file.     Patient Active Problem List    Diagnosis Date Noted    Cervical cord compression with myelopathy ([address] 2021    Cervical disc disorder with radiculopathy 2020    Anemia 2020    Chronic pain 2020    Closed fracture of femur, distal end ([address] 2020    JABARI (obstructive sleep apnea) 2020    History of anticoagulant therapy 2020    Hypothyroidism 2020    Other reduced mobility 2020    Depressive disorder 2020    Infection of prosthetic right knee joint ([facility] 06/10/2019    S/P revision of total knee, right 06/10/2019    Pain due to total right knee replacement ([address] 2018    H/O bariatric surgery 2016    Intra-abdominal infection 2016    Deficiency of macronutrients 2016    Morbid obesity ([address] 2016    Gastric ulcer 2016    Encounter for dietary counseling and surveillance 2014    IVCD (intraventricular conduction defect) 10/29/2011    Asthma 10/29/2011    Weight loss 10/29/2011    Near syncope 10/29/2011    Postural orthostatic tachycardia syndrome 10/14/2011    Obesity 2010    Lumbago 02/10/2004    Postlaminectomy syndrome of lumbar region 02/10/2004        Past Medical History:   Diagnosis Date    Arthritis     Back pain     Depression     Diabetes mellitus ([address]     5/15/2020: pt states DM is resolved    Fibromyalgia     Gastric ulcer     H/O thyroid disease     History of removal of joint prosthesis of right knee due to infection     Intra-abdominal infection     Neuropathy     Restless leg syndrome     S/P insertion of spinal cord stimulator     Urinary incontinence      Past Surgical History:   Procedure Laterality Date    CARPAL TUNNEL RELEASE      CERVICAL FUSION N/A 5/21/2020    ACDF C 5-6, C 6-7 performed by Sujata Walden MD at [place] [address] N/A 1/7/2021    POSTERIOR CERVICAL   DECOMPRESSION, LAMINOPLASTY AT C3, C4, AND C5 performed by Sujata Walden MD at [facility] GASTRIC BYPASS  2014   [facility]  2004    SPINE SURGERY  2000       Chart Reviewed: Yes  Patient assessed for rehabilitation services?: Yes  Family / Caregiver Present: No  General Comment  Comments: Pt laying in bed-agreeable to PT eval    Restrictions:  Restrictions/Precautions: Fall Risk     SUBJECTIVE: Subjective: Pt reports that he has chronic R LE issue from previous injury that requires pt to wear lock out knee brace when ambulating    Pain  Pre Treatment Pain Screening  Pain at present: 6  Scale Used: Numeric Score  Intervention List: Patient able to continue with treatment;Patient declined any intervention    Post Treatment Pain Screening:   Pain Screening  Patient Currently in Pain: Yes  Pain Assessment  Pain Assessment: 0-10  Pain Level: 6  Pain Type: Surgical pain  Pain Location: Neck  Pain Orientation: Posterior  Pain Descriptors: Aching  Pain Frequency: Continuous  Pain Onset: On-going  Functional Pain Assessment: Prevents or interferes with many active not passive activities  Non-Pharmaceutical Pain Intervention(s): Rest    Prior Level of Function:  Social/Functional History  Lives With: Spouse(child)  Type of Home: House  Home Layout: Two level, 1/2 bath on main level(master bath on lower level with 5 steps with handrail; 11 steps to upper level to full bath)  Home Access: Stairs to enter without rails  Entrance Stairs - Number of Steps: 1  Bathroom Shower/Tub: Tub/Shower unit  Bathroom Equipment: Shower chair  Home Equipment: Rolling walker(knee immobilizer when ambulating)  ADL Assistance: Independent(does not shower when alone)  Homemaking Responsibilities: Yes(shared)  Ambulation Assistance: Independent(cane)  Transfer Assistance: Independent  Active : No  Occupation: On disability    OBJECTIVE:   Vision: Within Functional Limits  Hearing: Exceptions to [facility]  Hearing Exceptions: (\"my hearing plugs\")    Cognition:  Overall Orientation Status: Within Functional Limits  Follows Commands: Within Functional Limits    Observation/Palpation  Posture: Good  Observation: pleasant, cooperative, no acute distress, on RA, hard cervical collar donned with good fit    ROM:  RLE AROM: WFL  LLE AROM : WFL    Strength:  Strength RLE  Comment: functionally >/=3+/5  Strength LLE  Comment: functionally >/=3+/5    Neuro:  Balance  Posture: Fair  Sitting - Static: Good  Sitting - Dynamic: Good;-  Standing - Static: Fair;+  Standing - Dynamic: Fair     Tone RLE  RLE Tone: Normotonic  Tone LLE  LLE Tone: Normotonic  Motor Control  Gross Motor?: WFL  Sensation  Overall Sensation Status: Impaired(numbness/tingling to L>R UE)    Bed mobility  Supine to Sit: Stand by assistance  Sit to Supine: Stand by assistance  Comment: HOB elevated, increased time for performance    Transfers  Sit to Stand: Stand by assistance  Stand to sit: Stand by assistance  Comment: good awareness of safe hand placement,VC cues with second stand->sit to find arm rests of chair    Ambulation  Ambulation?: Yes  Ambulation 1  Surface: level tile  Device: Rolling Walker;Single point cane  Assistance: Stand by assistance  Gait Deviations: Slow Bety; Increased ARMINDA; Decreased step length  Distance: 6ft with SPC, 50ft with 2ww  Comments: pt demonstrates improve gait pattern and indep with 2ww compared to [facility].  Pt agreeable to use 2ww upon home going and transition back to [facility] when indicated by PT    Activity Tolerance  Activity Tolerance: Patient Tolerated treatment well      PT Education  PT Education: Goals;PT Role;Plan of Care  Patient Education: AD recommendation    ASSESSMENT:   Body structures, Functions, Activity limitations: Decreased functional mobility ; Decreased balance;Decreased strength  Decision Making: Medium Complexity  History: med  Exam: med  Clinical Presentation: med    Patient Education: AD recommendation  Barriers to Learning: none    DISCHARGE RECOMMENDATIONS:  Discharge Recommendations: Continue to assess pending progress    Assessment: Pt demonstrates the above deficits and decline in functional mobility s/p cervical decompression and laminoplasty. Pt would benefit from physical therapy to address above deficits and allow for safe return home at highest level of function, decrease risk for falls, and improve QOL. REQUIRES PT FOLLOW UP: Yes      PLAN OF CARE:  Plan  Times per week: 5-7 QD  Current Treatment Recommendations: Strengthening, Functional Mobility Training, Neuromuscular Re-education, Home Exercise Program, Equipment Evaluation, Education, & procurement, Modalities, Safety Education & Training, Gait Training, Transfer Training, Balance Training, Stair training, Patient/Caregiver Education & Training, Positioning  Safety Devices  Type of devices: Left in bed, Call light within reach, Bed alarm in place    Goals:  Patient goals : \"be able to go home\"  Long term goals  Long term goal 1: Bed mobility with indep  Long term goal 2: Functional transfers with indep  Long term goal 3: Amb 100ft with 2ww and indep  Long term goal 4: 8 steps with handrail and indep    [facility] (6 CLICK) Dorys 95 Raw Score : 19     Therapy Time:   Individual   Time In 0848   Time Out 0910   Minutes [address], [place], 01/08/21 at 9:27 AM       Definitions for assistance levels  Independent = pt does not require any physical supervision or assistance from another person for activity completion. Device may be needed.   Stand by assistance = pt requires verbal cues or instructions from another person, close to but not touching, to perform the activity  Minimal assistance= pt performs 75% or more of the activity; assistance is required to complete the activity  Moderate assistance= pt performs 50% of the activity; assistance is required to complete the activity  Maximal assistance = pt performs 25% of the activity; assistance is required to complete the activity  Dependent = pt requires total physical assistance to accomplish the task

## 2021-06-04 ENCOUNTER — OFFICE VISIT (OUTPATIENT)
Dept: NEUROSURGERY | Age: 51
End: 2021-06-04
Payer: COMMERCIAL

## 2021-06-04 VITALS — TEMPERATURE: 97.1 F | BODY MASS INDEX: 20.92 KG/M2 | HEIGHT: 74 IN | WEIGHT: 163 LBS

## 2021-06-04 DIAGNOSIS — R29.898 WEAKNESS OF RIGHT ARM: ICD-10-CM

## 2021-06-04 DIAGNOSIS — M48.02 CERVICAL SPINAL STENOSIS: Primary | ICD-10-CM

## 2021-06-04 DIAGNOSIS — M54.12 CERVICAL RADICULOPATHY: ICD-10-CM

## 2021-06-04 DIAGNOSIS — M50.20 CERVICAL DISC HERNIATION: ICD-10-CM

## 2021-06-04 PROCEDURE — 99213 OFFICE O/P EST LOW 20 MIN: CPT | Performed by: NEUROLOGICAL SURGERY

## 2021-06-04 PROCEDURE — G8428 CUR MEDS NOT DOCUMENT: HCPCS | Performed by: NEUROLOGICAL SURGERY

## 2021-06-04 PROCEDURE — G8420 CALC BMI NORM PARAMETERS: HCPCS | Performed by: NEUROLOGICAL SURGERY

## 2021-06-04 PROCEDURE — 1036F TOBACCO NON-USER: CPT | Performed by: NEUROLOGICAL SURGERY

## 2021-06-04 ASSESSMENT — ENCOUNTER SYMPTOMS
BACK PAIN: 0
SHORTNESS OF BREATH: 0
CONSTIPATION: 0
DIARRHEA: 0
NAUSEA: 0

## 2021-06-04 NOTE — PROGRESS NOTES
[facility] ([facility] Physicians  Neurosurgery and Pain [facility]  [address] [place] [address]., [address] [facility] [address]: [phone]  F: [phone]       Nae Stacy  (1970)    6/4/2021    Referred by No ref. provider found    Subjective:     Nae Stacy is 53 y.o. male who complains today of:    Chief Complaint   Patient presents with    Neck Pain     The patient is here for follow up status post PCD/laminoplasty 1-7-2021. Incision site is clean and dry. History of ACDF 5-. He reports worsening recurrent neck pain with radiation to right shoulder down right arm. He is doing physical therapy currently. History of lumbar diskectomy surgery x1 in 2000, along with spinal cord stimulator, which is MRI compatible per patient.     Narcotics: None, Lyrica 150 mg bid           Allergies:  Latex and Adhesive tape    Past Medical History:   Diagnosis Date    Arthritis     Back pain     Depression     Diabetes mellitus ([address]     5/15/2020: pt states DM is resolved    Fibromyalgia     Gastric ulcer 2016    H/O thyroid disease     History of removal of joint prosthesis of right knee due to infection 2019    Intra-abdominal infection 2016    Neuropathy     Restless leg syndrome     S/P insertion of spinal cord stimulator     Urinary incontinence      Past Surgical History:   Procedure Laterality Date    CARPAL TUNNEL RELEASE      CERVICAL FUSION N/A 5/21/2020    ACDF C 5-6, C 6-7 performed by Curt Liz MD at [facility] 1/7/2021    POSTERIOR CERVICAL   DECOMPRESSION, LAMINOPLASTY AT C3, C4, AND C5 performed by Curt Liz MD at [address]  2014   [facility]  2004    SPINE SURGERY  2000     Family History   Problem Relation Age of Onset    Arthritis Mother     Heart Disease Mother     Hypertension Mother     Heart Disease Father     Hypertension Father      Social History     Socioeconomic History    Marital status:      Spouse name: Not on file    Number of children: Not on file    Years of education: Not on file    Highest education level: Not on file   Occupational History    Not on file   Tobacco Use    Smoking status: Never Smoker    Smokeless tobacco: Never Used   Substance and Sexual Activity    Alcohol use: Not Currently    Drug use: Yes     Types: Marijuana    Sexual activity: Not on file   Other Topics Concern    Not on file   Social History Narrative    Not on file     Social Determinants of Health     Financial Resource Strain:     Difficulty of Paying Living Expenses:    Food Insecurity:     Worried About Running Out of Food in the Last Year:     [address] in the Last Year:    Transportation Needs:     Lack of Transportation (Medical):      Lack of Transportation (Non-Medical):    Physical Activity:     Days of Exercise per Week:     Minutes of Exercise per Session:    Stress:     Feeling of Stress :    Social Connections:     Frequency of Communication with Friends and Family:     Frequency of Social Gatherings with Friends and Family:     Attends Anab[employer] Services:     Active Member of Clubs or Organizations:     Attends Club or Organization Meetings:     Marital Status:    Intimate Partner Violence:     Fear of Current or Ex-Partner:     Emotionally Abused:     Physically Abused:     Sexually Abused:        Current Outpatient Medications on File Prior to Visit   Medication Sig Dispense Refill    baclofen (LIORESAL) 20 MG tablet Take by mouth 4 times daily      colestipol (COLESTID) 1 g tablet Take 1 g by mouth daily      escitalopram (LEXAPRO) 10 MG tablet Take 10 mg by mouth daily      lamoTRIgine (LAMICTAL) 200 MG tablet Take 200 mg by mouth daily      chlordiazePOXIDE-Clidinium (LIBRAX PO) Take 1 tablet by mouth 3 times daily      ascorbic acid (VITAMIN C) 500 MG tablet Take 500 mg by mouth daily      Copper Gluconate 2 MG TABS Take 5 mg by mouth daily      pregabalin (LYRICA) 150 MG capsule Take 75 mg by mouth 3 times daily.  metoprolol tartrate (LOPRESSOR) 50 MG tablet Take 50 mg by mouth 2 times daily      ondansetron (ZOFRAN) 8 MG tablet Take 8 mg by mouth every 6 hours as needed for Nausea or Vomiting      midodrine (PROAMATINE) 5 MG tablet Take 5 mg by mouth 3 times daily      sildenafil (VIAGRA) 50 MG tablet Take 1 tablet by mouth as needed for Erectile Dysfunction 30 tablet 0    docusate sodium (COLACE) 100 MG capsule Take 100 mg by mouth 2 times daily      QUEtiapine (SEROQUEL) 100 MG tablet Take 200 mg by mouth nightly       BIOTIN 5000 PO Take 10,000 mcg by mouth daily       calcium carbonate 600 MG TABS tablet Take by mouth 2 times daily      Multiple Vitamin (THERA/BETA-CAROTENE) TABS Take 1 tablet by mouth daily       levothyroxine (SYNTHROID) 100 MCG tablet Take 100 mcg by mouth daily      diphenhydrAMINE (BENADRYL) 25 MG tablet Take 25 mg by mouth every 6 hours as needed for Itching      hydrOXYzine (ATARAX) 50 MG tablet Take 50 mg by mouth every 6 hours as needed for Anxiety       loperamide (IMODIUM) 2 MG capsule Take 2 mg by mouth 4 times daily as needed for Diarrhea       No current facility-administered medications on file prior to visit. Review of Systems   Constitutional: Negative for fever. HENT: Negative for hearing loss. Respiratory: Negative for shortness of breath. Gastrointestinal: Negative for constipation, diarrhea and nausea. Genitourinary: Negative for difficulty urinating. Musculoskeletal: Negative for back pain and neck pain. Skin: Negative for rash. Neurological: Negative for headaches. Hematological: Does not bruise/bleed easily. Psychiatric/Behavioral: Negative for sleep disturbance.                       Objective:     Vitals:  Temp 97.1 °F (36.2 °C)   Ht 6' 2\" (1.88 m)   Wt 163 lb (73.9 kg)   BMI 20.93 kg/m² Physical Exam  Constitutional:       Appearance: Normal appearance. HENT:      Head: Normocephalic and atraumatic. Mouth/Throat:      Mouth: Mucous membranes are moist.      Pharynx: Oropharynx is clear. Eyes:      Extraocular Movements: Extraocular movements intact. Pupils: Pupils are equal, round, and reactive to light. Cardiovascular:      Rate and Rhythm: Regular rhythm. Pulses: Normal pulses. Skin:     General: Skin is warm and dry. Neurological:      Mental Status: He is alert and oriented to person, place, and time. Sensory: No sensory deficit. Gait: Gait abnormal (Uses rolling walker for ambulation. ). Comments: Incision site is clean and dry of anterior & posterior neck and lumbar spine. Wearing right leg brace. No Deb's sign. Voice is normal.  5/5 bilateral deltoids, biceps, triceps, hand grasp, wrist and finger flexors & extensors and abductor pollicis brevis. Negative Fox's sign bilaterally. No ankle clonus bilaterally. Wife is present for exam.   Psychiatric:         Mood and Affect: Mood normal.           Assessment:      Diagnosis Orders   1. Cervical spinal stenosis  CT CERVICAL SPINE WO CONTRAST   2. Cervical disc herniation  CT CERVICAL SPINE WO CONTRAST   3. Cervical radiculopathy  CT CERVICAL SPINE WO CONTRAST   4. Weakness of right arm  CT CERVICAL SPINE WO CONTRAST       Plan:      Keyonna returns my office continued follow-up. History of ACDF with a spinal cord injury. No new changes. Continued complaints of neck pain diffuse along with a right arm pain. Stable motor or sensory reflex findings. Neupro incisions well-healed. Currently taking Lyrica which was increased 250 twice daily. History of ACDF with the continuing pain. Patient is to undergo CAT scan noncontrast for the assessment of surgical fusion status.     I will see him after CAT scan        Orders Placed This Encounter   Procedures    CT CERVICAL SPINE WO CONTRAST Standing Status:   Future     Standing Expiration Date:   9/2/2021         Follow up:  Return in about 4 weeks (around 7/2/2021).     Maninder East MD

## 2021-06-07 ENCOUNTER — TELEPHONE (OUTPATIENT)
Dept: NEUROSURGERY | Age: 51
End: 2021-06-07

## 2021-06-07 NOTE — TELEPHONE ENCOUNTER
Marie from [address] [place] [address] called wanting to let Dr. Ginette Hall know that the patient's home healthcare was being transferred to his PCP because his pain has become more than a neck issue. She is asking for a call back once Dr. Ginette Hall has been made aware.  [phone]

## 2021-06-22 ENCOUNTER — TELEPHONE (OUTPATIENT)
Dept: NEUROSURGERY | Age: 51
End: 2021-06-22

## 2021-06-22 NOTE — TELEPHONE ENCOUNTER
CT Cervical w/out contrast order along w/auth letter faxed to [facility] ([phone]), they will call patient to schedule.      Shima Barksdale #[id number]  6- thru 8-6-2021

## 2021-07-22 ENCOUNTER — TELEPHONE (OUTPATIENT)
Dept: NEUROSURGERY | Age: 51
End: 2021-07-22

## 2021-07-22 NOTE — TELEPHONE ENCOUNTER
REC'D MESSAGE TO CALL PT RE: Demetrice Iglesias. PT WAS CALLED BACK AND REPORTS HE FELL 2 NIGHTS AGO WITH NEW ONSET OF ENTIRE RIGHT ARM NUMBNESS AND WEAKNESS STATING HE CAN BARELY GRAB ANYTHING, WHICH IS NEW TO HIM SINCE THE FALL. HE ALSO REPORTS SOME INCREASED RIGHT ARM PAIN, BUT MORE CONCERNED WITH NUMBNESS AND WEAKNESS. HE REPORTS LAST TIME HE SAW DR. Mookie Pope, HE TRIED TELLING HIM IT BURNS DOWN HIS RIGHT ARM AND NOT GETTING ANY BETTER. HE REPORTS HE KNOWS HE HAS ISSUES WITH HIS NECK AND IS CONCERNED. NO NEW CHANGES WITH INCONTINENCE. PT WAS LAST SEEN ON 6-4-21 AND C/S CT AND X-RAYS WERE ORDERED AT THAT TIME, HOWEVER, PT HAS NOT YET HAD DONE. PT WAS MADE AWARE TO GET THOSE STUDIES SCHEDULED/DONE. PT MADE AWRAE IF SYMPTOMS WORSENS TO GO TO ER. PT VERBALIZED UNDERSTANDING. PT ALSO WAS MADE AWARE OF DR. GOLDBERG LEAVING OFFICE AND IMPORTANCE OF ESTABLISHING A NEUROSURGEON FOR WHICH PT WILL CONTACT [facility] SINCE HE HAS OTHER DR'S AT THAT FACILITY.

## 2021-07-23 NOTE — TELEPHONE ENCOUNTER
RECOMMEND FOR PT TO GO TO ER IF SYMPTOMS WORSENS OR SEVERE ENOUGH.  OTHERWISE, NEED TO WAIT FOR PT TO GET CT AND X-RAYS DONE AND BRING ON DISC.

## 2021-07-23 NOTE — TELEPHONE ENCOUNTER
PT WAS CALLED & MADE AWARE OF DR. Aline Hurtado RESPONSE. C/S CT AND X-RAYS ORDERS WERE RE-FAXED TO Jacqueline Solorio TO [facility] #[phone]. PT MADE AWARE CT AUTH IS ONLY GOOD UNTIL 8-6-21. PT MADE AWARE TO GET BOTH STUDIES PUT ON DISC FOR DR. GOLDBERG TO REVIEW. PT VERBALIZED UNDERSTANDING.

## 2021-07-27 DIAGNOSIS — M48.02 CERVICAL SPINAL STENOSIS: ICD-10-CM

## 2021-07-27 DIAGNOSIS — M54.12 CERVICAL RADICULOPATHY: ICD-10-CM

## 2021-07-27 DIAGNOSIS — M50.20 CERVICAL DISC HERNIATION: ICD-10-CM

## 2021-07-27 DIAGNOSIS — R29.898 WEAKNESS OF RIGHT ARM: ICD-10-CM

## 2021-07-28 ENCOUNTER — TELEPHONE (OUTPATIENT)
Dept: NEUROSURGERY | Age: 51
End: 2021-07-28

## 2021-07-28 NOTE — TELEPHONE ENCOUNTER
PT WAS CALLED AND LMVM. WHEN PT CALLS BACK, PLEASE SCHEDULE AN APPT AND REMIND PT TO BRING STUDIES ON DISCS. THERE ARE OPENINGS IN [facility] ON 8/20.

## 2021-07-28 NOTE — TELEPHONE ENCOUNTER
Pt scheduled and states he has the discs. He is asking if he can be called and told the results prior to his appointment?

## 2021-07-28 NOTE — TELEPHONE ENCOUNTER
----- Message from Don Templeton MD sent at 7/28/2021  9:21 AM EDT -----  PLEASE SCHEDULE A FOLLOW UP APPT TO REVIEW STUDIES. MAKE SURE PT'S BRING CT AND X-RAYS ON DISC TO APPT.

## 2021-07-29 NOTE — TELEPHONE ENCOUNTER
PT WAS CALLED & [facility] TO MAKE AWARE HE IS WELCOME TO DROP OFF HIS DISCS TO OUR [place] OFFICE TOMORROW 8-4 AND CAN REVIEW WITH DR. Tae Pickett IF HE WOULD REVIEW THESE PRIOR TO HIS NEXT APPT. BUT, DR. Tae Pickett DOES NEEDS THE DISCS TO REVIEW.

## 2021-08-02 ENCOUNTER — TELEPHONE (OUTPATIENT)
Dept: NEUROSURGERY | Age: 51
End: 2021-08-02

## 2021-08-02 NOTE — TELEPHONE ENCOUNTER
PT CALLED IN WITH C/O FALLING LAST NIGHT HITTING HIS HEAD AND NECK. PT REPORTS INCREASED PAIN AND NUMBNESS AND TINGLING DOWN RIGHT ARM, ALONG WITH WORSENING SUBJECTIVE WEAKNESS. NO CHANGE WITH INCONTINENCE. PT MADE AWARE IF SYMPTOMS WORSENS GO TO ER. APPT WAS MOVED UP PER PT REQUEST AND AWARE TO BRING CT/X-RAYS ON DISC TO APPT. DR. Latoya Mejia WILL BE MADE AWARE.

## 2021-08-04 NOTE — TELEPHONE ENCOUNTER
Returned pt call and left detailed msg with Dr. Terri Aly response and reminding him when his appointment was.

## 2021-08-06 ENCOUNTER — OFFICE VISIT (OUTPATIENT)
Dept: NEUROSURGERY | Age: 51
End: 2021-08-06
Payer: COMMERCIAL

## 2021-08-06 VITALS — WEIGHT: 158 LBS | HEIGHT: 74 IN | BODY MASS INDEX: 20.28 KG/M2 | TEMPERATURE: 97.6 F

## 2021-08-06 DIAGNOSIS — M54.12 CERVICAL RADICULOPATHY: ICD-10-CM

## 2021-08-06 DIAGNOSIS — R48.2 APRAXIA: ICD-10-CM

## 2021-08-06 DIAGNOSIS — G89.4 CHRONIC PAIN SYNDROME: ICD-10-CM

## 2021-08-06 DIAGNOSIS — M48.02 CERVICAL SPINAL STENOSIS: Primary | ICD-10-CM

## 2021-08-06 DIAGNOSIS — R29.898 WEAKNESS OF RIGHT ARM: ICD-10-CM

## 2021-08-06 DIAGNOSIS — M50.20 CERVICAL DISC HERNIATION: ICD-10-CM

## 2021-08-06 PROCEDURE — G8420 CALC BMI NORM PARAMETERS: HCPCS | Performed by: NEUROLOGICAL SURGERY

## 2021-08-06 PROCEDURE — G8427 DOCREV CUR MEDS BY ELIG CLIN: HCPCS | Performed by: NEUROLOGICAL SURGERY

## 2021-08-06 PROCEDURE — 99213 OFFICE O/P EST LOW 20 MIN: CPT | Performed by: NEUROLOGICAL SURGERY

## 2021-08-06 PROCEDURE — 1036F TOBACCO NON-USER: CPT | Performed by: NEUROLOGICAL SURGERY

## 2021-08-06 RX ORDER — METHYLPREDNISOLONE 4 MG/1
TABLET ORAL
Qty: 1 KIT | Refills: 0 | Status: SHIPPED | OUTPATIENT
Start: 2021-08-06 | End: 2021-08-12

## 2021-08-06 ASSESSMENT — ENCOUNTER SYMPTOMS
NAUSEA: 0
BACK PAIN: 0
DIARRHEA: 0
SHORTNESS OF BREATH: 0
CONSTIPATION: 0

## 2021-08-06 NOTE — PROGRESS NOTES
[facility] [place] ([facility]) Physicians  Neurosurgery and Pain [address] [facility] [place]: [phone]  F: [phone]       Maxime Langford  (1970)    8/6/2021    Referred by No ref. provider found    Subjective:     Maxime Langford is 53 y.o. male who complains today of:    Chief Complaint   Patient presents with    Back Pain     The patient is here for follow up after CT and x-rays. Feels worse compared to last visit with bilateral arm pain into the hands, along with subjective weakness, worse on the right after falling backwards hitting his head on either the wall or the dresser 1.5 weeks ago. History of PCD/laminoplasty 1-7-2021. Incision site is clean and dry. History of ACDF 5-. He has done physical therapy. History of lumbar diskectomy surgery x1 in 2000, along with spinal cord stimulator, which is MRI compatible per patient.           Allergies:  Latex and Adhesive tape    Past Medical History:   Diagnosis Date    Arthritis     Back pain     Depression     Diabetes mellitus ([address]     5/15/2020: pt states DM is resolved    Fibromyalgia     Gastric ulcer 2016    H/O thyroid disease     History of removal of joint prosthesis of right knee due to infection 2019    Intra-abdominal infection 2016    Neuropathy     Restless leg syndrome     S/P insertion of spinal cord stimulator     Urinary incontinence      Past Surgical History:   Procedure Laterality Date    CARPAL TUNNEL RELEASE      CERVICAL FUSION N/A 5/21/2020    ACDF C 5-6, C 6-7 performed by Bree Maldonado MD at [address] 1/7/2021    POSTERIOR CERVICAL   DECOMPRESSION, LAMINOPLASTY AT C3, C4, AND C5 performed by Bree Maldonado MD at [facility] GASTRIC BYPASS  2014   [address] [address]    SPINE SURGERY  2000     Family History   Problem Relation Age of Onset    Arthritis Mother     Heart Disease Mother     Hypertension Mother     Heart Disease Father     Hypertension Father      Social History     Socioeconomic History    Marital status:      Spouse name: Not on file    Number of children: Not on file    Years of education: Not on file    Highest education level: Not on file   Occupational History    Not on file   Tobacco Use    Smoking status: Never Smoker    Smokeless tobacco: Never Used   Substance and Sexual Activity    Alcohol use: Not Currently    Drug use: Yes     Types: Marijuana    Sexual activity: Not on file   Other Topics Concern    Not on file   Social History Narrative    Not on file     Social Determinants of Health     Financial Resource Strain:     Difficulty of Paying Living Expenses:    Food Insecurity:     Worried About Running Out of Food in the Last Year:     [address] in the Last Year:    Transportation Needs:     Lack of Transportation (Medical):      Lack of Transportation (Non-Medical):    Physical Activity:     Days of Exercise per Week:     Minutes of Exercise per Session:    Stress:     Feeling of Stress :    Social Connections:     Frequency of Communication with Friends and Family:     Frequency of Social Gatherings with Friends and Family:     Attends Buddhist Services:     Active Member of Clubs or Organizations:     Attends Club or Organization Meetings:     Marital Status:    Intimate Partner Violence:     Fear of Current or Ex-Partner:     Emotionally Abused:     Physically Abused:     Sexually Abused:        Current Outpatient Medications on File Prior to Visit   Medication Sig Dispense Refill    baclofen (LIORESAL) 20 MG tablet Take by mouth 4 times daily      colestipol (COLESTID) 1 g tablet Take 1 g by mouth daily      escitalopram (LEXAPRO) 10 MG tablet Take 10 mg by mouth daily      lamoTRIgine (LAMICTAL) 200 MG tablet Take 200 mg by mouth daily      chlordiazePOXIDE-Clidinium (LIBRAX PO) Take 1 tablet by mouth 3 times daily      ascorbic acid (VITAMIN C) 500 MG tablet Take 500 mg by mouth daily      Copper Gluconate 2 MG TABS Take 5 mg by mouth daily      pregabalin (LYRICA) 150 MG capsule Take 75 mg by mouth 3 times daily.  metoprolol tartrate (LOPRESSOR) 50 MG tablet Take 50 mg by mouth 2 times daily      ondansetron (ZOFRAN) 8 MG tablet Take 8 mg by mouth every 6 hours as needed for Nausea or Vomiting      midodrine (PROAMATINE) 5 MG tablet Take 5 mg by mouth 3 times daily      sildenafil (VIAGRA) 50 MG tablet Take 1 tablet by mouth as needed for Erectile Dysfunction 30 tablet 0    docusate sodium (COLACE) 100 MG capsule Take 100 mg by mouth 2 times daily      QUEtiapine (SEROQUEL) 100 MG tablet Take 200 mg by mouth nightly       BIOTIN 5000 PO Take 10,000 mcg by mouth daily       calcium carbonate 600 MG TABS tablet Take by mouth 2 times daily      Multiple Vitamin (THERA/BETA-CAROTENE) TABS Take 1 tablet by mouth daily       levothyroxine (SYNTHROID) 100 MCG tablet Take 100 mcg by mouth daily      diphenhydrAMINE (BENADRYL) 25 MG tablet Take 25 mg by mouth every 6 hours as needed for Itching      hydrOXYzine (ATARAX) 50 MG tablet Take 50 mg by mouth every 6 hours as needed for Anxiety       loperamide (IMODIUM) 2 MG capsule Take 2 mg by mouth 4 times daily as needed for Diarrhea       No current facility-administered medications on file prior to visit. Review of Systems   Constitutional: Negative for fever. HENT: Negative for hearing loss. Respiratory: Negative for shortness of breath. Gastrointestinal: Negative for constipation, diarrhea and nausea. Genitourinary: Negative for difficulty urinating. Musculoskeletal: Negative for back pain and neck pain. Skin: Negative for rash. Neurological: Negative for headaches. Hematological: Does not bruise/bleed easily. Psychiatric/Behavioral: Negative for sleep disturbance. Objective:     Vitals:  Temp 97.6 °F (36.4 °C)   Ht 6' 2\" (1.88 m)   Wt 158 lb (71.7 kg)   BMI 20.29 kg/m²        Physical Exam  Constitutional:       Appearance: Normal appearance. HENT:      Head: Normocephalic and atraumatic. Mouth/Throat:      Mouth: Mucous membranes are moist.      Pharynx: Oropharynx is clear. Eyes:      Extraocular Movements: Extraocular movements intact. Pupils: Pupils are equal, round, and reactive to light. Cardiovascular:      Rate and Rhythm: Regular rhythm. Pulses: Normal pulses. Skin:     General: Skin is warm and dry. Neurological:      Mental Status: He is alert and oriented to person, place, and time. Sensory: Sensory deficit (Hypalgesia of right arm.) present. Gait: Gait abnormal (Uses rolling walker for ambulation. ). Comments: Incision site is clean and dry of anterior & posterior neck and lumbar spine. Wearing right leg brace. No Deb's sign. Voice is normal.  Right deltoid and biceps weakness noted. Wife is present for exam.   Psychiatric:         Mood and Affect: Mood normal.           Assessment:      Diagnosis Orders   1. Cervical spinal stenosis  MRI CERVICAL SPINE W WO CONTRAST   2. Cervical disc herniation  MRI CERVICAL SPINE W WO CONTRAST   3. Cervical radiculopathy  MRI CERVICAL SPINE W WO CONTRAST   4. Chronic pain syndrome  MRI CERVICAL SPINE W WO CONTRAST   5. Weakness of right arm  MRI CERVICAL SPINE W WO CONTRAST   6. Apraxia         Plan:          Orders Placed This Encounter   Medications    methylPREDNISolone (MEDROL DOSEPACK) 4 MG tablet     Sig: Take by mouth. Dispense:  1 kit     Refill:  0       Orders Placed This Encounter   Procedures    MRI CERVICAL SPINE W WO CONTRAST     Standing Status:   Future     Standing Expiration Date:   11/4/2021   Abelardo Hobbs comes to my office with chief complaint of right arm pain with weakness.     He is well-known to me with a history of anterior cervical discectomy with fusion at lower spine and upper spinal posterior cervical decompression and laminoplasty. Patient has a history of frequent falls. Unfortunately with a recent fall 2 weeks ago falling back into the right there has been new onset of increasing right arm pain with numbness and weakness. Difficult to raise the right arm above the shoulder level. No changes in voice swallowing or left-sided or bilateral lower extremities. Clinically shows right deltoid and biceps weakness worse in deltoid compared to the left. Somewhat decreased right biceps reflex compared to the left hypalgesia below the elbow on the right but not following dermatome distribution. CAT scan of cervical spine was reviewed. Impression: Cervical disc protrusion nerve root compression status post fall at upper cervical spine versus cervical radicular nerve apraxia. Patient is recommend to undergo MRI of cervical spine with and without contrast.  Hopefully there is no disc disease that we can treated as an apraxia which is likely to improve. Even if there is a disc herniation, hopefully with therapy he is strength and pain does improve so that he could avoid any further surgical intervention. In the meantime somewhat concerned about possible loosening of screws from inferior end of anterior cervical plate from multiple falls for which patient is recommend to undergo repeat cervical spine x-rays through family physician. Follow up:  No follow-ups on file.     Irma Ellington MD

## 2021-08-09 ENCOUNTER — TELEPHONE (OUTPATIENT)
Dept: PAIN MANAGEMENT | Age: 51
End: 2021-08-09

## 2021-08-09 NOTE — TELEPHONE ENCOUNTER
MRI Cervical w/&w/out contrast authorization request submitted online ([employer]), clinical faxed ([phone]), case #[id number]    MRI Cervical to be done at [facility]

## 2021-08-12 NOTE — TELEPHONE ENCOUNTER
MRI Cervical w/&w/out contrast order along w/auth letter faxed to [facility] ([phone]). They will call patient to schedule.         Richard Grayson #[id number]   8-9-2021 thru 9-

## 2021-08-30 ENCOUNTER — TELEPHONE (OUTPATIENT)
Dept: NEUROSURGERY | Age: 51
End: 2021-08-30

## 2021-08-30 NOTE — TELEPHONE ENCOUNTER
Patient called stating that he was not able to have his MRI today due to going to the wrong building. He is scheduled for 9/3 but is concerned that Dr. Radha Restrepo will not be able to review the results and give him a plan of care. He is asking what he should do?

## 2021-08-31 NOTE — TELEPHONE ENCOUNTER
Pt was called back and [facility] stating Dr. Gricel Johnson very last day is Friday, September 3rd done at noon and will not be able to review MRI after that day. However, if pt had add'l questions to call back and can further review with Dr. Donny Peres.

## 2021-09-07 DIAGNOSIS — M48.02 CERVICAL SPINAL STENOSIS: ICD-10-CM

## 2021-09-07 DIAGNOSIS — R29.898 WEAKNESS OF RIGHT ARM: ICD-10-CM

## 2021-09-07 DIAGNOSIS — M50.20 CERVICAL DISC HERNIATION: ICD-10-CM

## 2021-09-07 DIAGNOSIS — M54.12 CERVICAL RADICULOPATHY: ICD-10-CM

## 2021-09-07 DIAGNOSIS — G89.4 CHRONIC PAIN SYNDROME: ICD-10-CM

## 2023-03-06 LAB
ABO GROUP (TYPE) IN BLOOD: NORMAL
ACTIVATED PARTIAL THROMBOPLASTIN TIME IN PPP BY COAGULATION ASSAY: 45 SEC (ref 26–39)
ALANINE AMINOTRANSFERASE (SGPT) (U/L) IN SER/PLAS: 13 U/L (ref 10–52)
ALBUMIN (G/DL) IN SER/PLAS: 3.1 G/DL (ref 3.4–5)
ALKALINE PHOSPHATASE (U/L) IN SER/PLAS: 109 U/L (ref 33–120)
ANION GAP IN SER/PLAS: 10 MMOL/L (ref 10–20)
ANTIBODY SCREEN: NORMAL
ASPARTATE AMINOTRANSFERASE (SGOT) (U/L) IN SER/PLAS: 18 U/L (ref 9–39)
BASOPHILS (10*3/UL) IN BLOOD BY AUTOMATED COUNT: 0.03 X10E9/L (ref 0–0.1)
BASOPHILS/100 LEUKOCYTES IN BLOOD BY AUTOMATED COUNT: 0.4 % (ref 0–2)
BILIRUBIN TOTAL (MG/DL) IN SER/PLAS: 0.3 MG/DL (ref 0–1.2)
C REACTIVE PROTEIN (MG/L) IN SER/PLAS: 8.54 MG/DL
CALCIUM (MG/DL) IN SER/PLAS: 8.5 MG/DL (ref 8.6–10.6)
CARBON DIOXIDE, TOTAL (MMOL/L) IN SER/PLAS: 26 MMOL/L (ref 21–32)
CHLORIDE (MMOL/L) IN SER/PLAS: 105 MMOL/L (ref 98–107)
CREATININE (MG/DL) IN SER/PLAS: 0.55 MG/DL (ref 0.5–1.3)
EOSINOPHILS (10*3/UL) IN BLOOD BY AUTOMATED COUNT: 0.11 X10E9/L (ref 0–0.7)
EOSINOPHILS/100 LEUKOCYTES IN BLOOD BY AUTOMATED COUNT: 1.4 % (ref 0–6)
ERYTHROCYTE DISTRIBUTION WIDTH (RATIO) BY AUTOMATED COUNT: 17.3 % (ref 11.5–14.5)
ERYTHROCYTE MEAN CORPUSCULAR HEMOGLOBIN CONCENTRATION (G/DL) BY AUTOMATED: 31.2 G/DL (ref 32–36)
ERYTHROCYTE MEAN CORPUSCULAR VOLUME (FL) BY AUTOMATED COUNT: 68 FL (ref 80–100)
ERYTHROCYTES (10*6/UL) IN BLOOD BY AUTOMATED COUNT: 3.81 X10E12/L (ref 4.5–5.9)
GFR MALE: >90 ML/MIN/1.73M2
GLUCOSE (MG/DL) IN SER/PLAS: 114 MG/DL (ref 74–99)
HEMATOCRIT (%) IN BLOOD BY AUTOMATED COUNT: 26 % (ref 41–52)
HEMOGLOBIN (G/DL) IN BLOOD: 8.1 G/DL (ref 13.5–17.5)
IMMATURE GRANULOCYTES/100 LEUKOCYTES IN BLOOD BY AUTOMATED COUNT: 0.5 % (ref 0–0.9)
INR IN PPP BY COAGULATION ASSAY: 1.4 (ref 0.9–1.1)
LEUKOCYTES (10*3/UL) IN BLOOD BY AUTOMATED COUNT: 8 X10E9/L (ref 4.4–11.3)
LYMPHOCYTES (10*3/UL) IN BLOOD BY AUTOMATED COUNT: 1.26 X10E9/L (ref 1.2–4.8)
LYMPHOCYTES/100 LEUKOCYTES IN BLOOD BY AUTOMATED COUNT: 15.8 % (ref 13–44)
MONOCYTES (10*3/UL) IN BLOOD BY AUTOMATED COUNT: 0.74 X10E9/L (ref 0.1–1)
MONOCYTES/100 LEUKOCYTES IN BLOOD BY AUTOMATED COUNT: 9.3 % (ref 2–10)
NEUTROPHILS (10*3/UL) IN BLOOD BY AUTOMATED COUNT: 5.78 X10E9/L (ref 1.2–7.7)
NEUTROPHILS/100 LEUKOCYTES IN BLOOD BY AUTOMATED COUNT: 72.6 % (ref 40–80)
NRBC (PER 100 WBCS) BY AUTOMATED COUNT: 0 /100 WBC (ref 0–0)
PATIENT TEMPERATURE: 37 DEGREES C
PLATELETS (10*3/UL) IN BLOOD AUTOMATED COUNT: 418 X10E9/L (ref 150–450)
POCT ANION GAP, VENOUS: 6 MMOL/L (ref 10–25)
POCT BASE EXCESS, VENOUS: 3.9 MMOL/L (ref -2–3)
POCT CHLORIDE, VENOUS: 104 MMOL/L (ref 98–107)
POCT GLUCOSE, VENOUS: 109 MG/DL (ref 74–99)
POCT HCO3, VENOUS: 29.9 MMOL/L (ref 22–26)
POCT HEMATOCRIT, VENOUS: 24 % (ref 41–52)
POCT HEMOGLOBIN, VENOUS: 7.9 G/DL (ref 13.5–17.5)
POCT IONIZED CALCIUM, VENOUS: 1.22 MMOL/L (ref 1.1–1.33)
POCT LACTATE, VENOUS: 1 MMOL/L (ref 0.4–2)
POCT OXY HEMOGLOBIN, VENOUS: 53.4 % (ref 45–75)
POCT PCO2, VENOUS: 53 MMHG (ref 41–51)
POCT PH, VENOUS: 7.36 (ref 7.33–7.43)
POCT PO2, VENOUS: 38 MMHG (ref 35–45)
POCT POTASSIUM, VENOUS: 3.9 MMOL/L (ref 3.5–5.3)
POCT SO2, VENOUS: 54 % (ref 45–75)
POCT SODIUM, VENOUS: 136 MMOL/L (ref 136–145)
POTASSIUM (MMOL/L) IN SER/PLAS: 4 MMOL/L (ref 3.5–5.3)
PROTEIN TOTAL: 6.9 G/DL (ref 6.4–8.2)
PROTHROMBIN TIME (PT) IN PPP BY COAGULATION ASSAY: 15.8 SEC (ref 9.8–13.4)
RH FACTOR: NORMAL
SEDIMENTATION RATE, ERYTHROCYTE: 101 MM/H (ref 0–20)
SODIUM (MMOL/L) IN SER/PLAS: 137 MMOL/L (ref 136–145)
UREA NITROGEN (MG/DL) IN SER/PLAS: 11 MG/DL (ref 6–23)

## 2023-03-07 ENCOUNTER — HOSPITAL ENCOUNTER (INPATIENT)
Dept: DATA CONVERSION | Facility: HOSPITAL | Age: 53
Discharge: HOME HEALTH | End: 2023-03-17
Attending: STUDENT IN AN ORGANIZED HEALTH CARE EDUCATION/TRAINING PROGRAM | Admitting: STUDENT IN AN ORGANIZED HEALTH CARE EDUCATION/TRAINING PROGRAM

## 2023-03-07 DIAGNOSIS — Z79.890 HORMONE REPLACEMENT THERAPY: ICD-10-CM

## 2023-03-07 DIAGNOSIS — Z20.822 CONTACT WITH AND (SUSPECTED) EXPOSURE TO COVID-19: ICD-10-CM

## 2023-03-07 DIAGNOSIS — M46.22 OSTEOMYELITIS OF VERTEBRA, CERVICAL REGION (MULTI): ICD-10-CM

## 2023-03-07 DIAGNOSIS — E03.9 HYPOTHYROIDISM, UNSPECIFIED: ICD-10-CM

## 2023-03-07 DIAGNOSIS — Z86.718 PERSONAL HISTORY OF OTHER VENOUS THROMBOSIS AND EMBOLISM: ICD-10-CM

## 2023-03-07 DIAGNOSIS — M19.90 UNSPECIFIED OSTEOARTHRITIS, UNSPECIFIED SITE: ICD-10-CM

## 2023-03-07 DIAGNOSIS — I10 ESSENTIAL (PRIMARY) HYPERTENSION: ICD-10-CM

## 2023-03-07 DIAGNOSIS — I45.4 NONSPECIFIC INTRAVENTRICULAR BLOCK: ICD-10-CM

## 2023-03-07 DIAGNOSIS — J44.9 CHRONIC OBSTRUCTIVE PULMONARY DISEASE, UNSPECIFIED (MULTI): ICD-10-CM

## 2023-03-07 DIAGNOSIS — T81.30XA DISRUPTION OF WOUND, UNSPECIFIED, INITIAL ENCOUNTER: ICD-10-CM

## 2023-03-07 DIAGNOSIS — F41.9 ANXIETY DISORDER, UNSPECIFIED: ICD-10-CM

## 2023-03-07 DIAGNOSIS — L97.819 NON-PRESSURE CHRONIC ULCER OF OTHER PART OF RIGHT LOWER LEG WITH UNSPECIFIED SEVERITY (MULTI): ICD-10-CM

## 2023-03-07 DIAGNOSIS — M79.7 FIBROMYALGIA: ICD-10-CM

## 2023-03-07 DIAGNOSIS — E44.0 MODERATE PROTEIN-CALORIE MALNUTRITION (MULTI): ICD-10-CM

## 2023-03-07 DIAGNOSIS — R78.81 BACTEREMIA: ICD-10-CM

## 2023-03-07 DIAGNOSIS — T81.49XA INFECTION FOLLOWING A PROCEDURE, OTHER SURGICAL SITE, INITIAL ENCOUNTER: ICD-10-CM

## 2023-03-07 DIAGNOSIS — B96.4 PROTEUS (MIRABILIS) (MORGANII) AS THE CAUSE OF DISEASES CLASSIFIED ELSEWHERE: ICD-10-CM

## 2023-03-07 DIAGNOSIS — G89.11 ACUTE PAIN DUE TO TRAUMA: ICD-10-CM

## 2023-03-07 DIAGNOSIS — Z79.899 OTHER LONG TERM (CURRENT) DRUG THERAPY: ICD-10-CM
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
## 2023-03-07 DIAGNOSIS — R22.41 LOCALIZED SWELLING, MASS AND LUMP, RIGHT LOWER LIMB: ICD-10-CM

## 2023-03-07 DIAGNOSIS — D64.9 ANEMIA, UNSPECIFIED: ICD-10-CM

## 2023-03-07 DIAGNOSIS — J45.909 UNSPECIFIED ASTHMA, UNCOMPLICATED (HHS-HCC): ICD-10-CM

## 2023-03-07 DIAGNOSIS — G24.9 DYSTONIA, UNSPECIFIED: ICD-10-CM

## 2023-03-07 DIAGNOSIS — M86.9 OSTEOMYELITIS, UNSPECIFIED (MULTI): ICD-10-CM

## 2023-03-07 DIAGNOSIS — Z01.818 ENCOUNTER FOR OTHER PREPROCEDURAL EXAMINATION: ICD-10-CM

## 2023-03-07 DIAGNOSIS — R50.9 FEVER, UNSPECIFIED: ICD-10-CM

## 2023-03-07 LAB
COBALAMIN (VITAMIN B12) (PG/ML) IN SER/PLAS: 900 PG/ML (ref 211–911)
FERRITIN (UG/LL) IN SER/PLAS: 65 UG/L (ref 20–300)
FOLATE (NG/ML) IN SER/PLAS: >24 NG/ML
IRON (UG/DL) IN SER/PLAS: 11 UG/DL (ref 35–150)
IRON BINDING CAPACITY (UG/DL) IN SER/PLAS: 264 UG/DL (ref 240–445)
IRON SATURATION (%) IN SER/PLAS: 4 % (ref 25–45)
NATRIURETIC PEPTIDE B (PG/ML) IN SER/PLAS: 43 PG/ML (ref 0–99)
SARS-COV-2 RESULT: NOT DETECTED
THYROTROPIN (MIU/L) IN SER/PLAS BY DETECTION LIMIT <= 0.05 MIU/L: 0.79 MIU/L (ref 0.44–3.98)

## 2023-03-08 LAB
ABO GROUP (TYPE) IN BLOOD: NORMAL
ACTIVATED PARTIAL THROMBOPLASTIN TIME IN PPP BY COAGULATION ASSAY: 48 SEC (ref 26–39)
ALBUMIN (G/DL) IN SER/PLAS: 2.6 G/DL (ref 3.4–5)
ANION GAP IN SER/PLAS: 8 MMOL/L (ref 10–20)
ATRIAL RATE: 69 BPM
CALCIUM (MG/DL) IN SER/PLAS: 8.1 MG/DL (ref 8.6–10.6)
CARBON DIOXIDE, TOTAL (MMOL/L) IN SER/PLAS: 28 MMOL/L (ref 21–32)
CHLORIDE (MMOL/L) IN SER/PLAS: 108 MMOL/L (ref 98–107)
CREATININE (MG/DL) IN SER/PLAS: 0.53 MG/DL (ref 0.5–1.3)
ERYTHROCYTE DISTRIBUTION WIDTH (RATIO) BY AUTOMATED COUNT: 18.2 % (ref 11.5–14.5)
ERYTHROCYTE MEAN CORPUSCULAR HEMOGLOBIN CONCENTRATION (G/DL) BY AUTOMATED: 29.7 G/DL (ref 32–36)
ERYTHROCYTE MEAN CORPUSCULAR VOLUME (FL) BY AUTOMATED COUNT: 72 FL (ref 80–100)
ERYTHROCYTES (10*6/UL) IN BLOOD BY AUTOMATED COUNT: 3.46 X10E12/L (ref 4.5–5.9)
GFR MALE: >90 ML/MIN/1.73M2
GLUCOSE (MG/DL) IN SER/PLAS: 121 MG/DL (ref 74–99)
HEMATOCRIT (%) IN BLOOD BY AUTOMATED COUNT: 24.9 % (ref 41–52)
HEMOGLOBIN (G/DL) IN BLOOD: 7.4 G/DL (ref 13.5–17.5)
INR IN PPP BY COAGULATION ASSAY: 1.3 (ref 0.9–1.1)
LEUKOCYTES (10*3/UL) IN BLOOD BY AUTOMATED COUNT: 6 X10E9/L (ref 4.4–11.3)
NRBC (PER 100 WBCS) BY AUTOMATED COUNT: 0 /100 WBC (ref 0–0)
P AXIS: 59 DEGREES
P OFFSET: 196 MS
P ONSET: 139 MS
PHOSPHATE (MG/DL) IN SER/PLAS: 3.5 MG/DL (ref 2.5–4.9)
PLATELETS (10*3/UL) IN BLOOD AUTOMATED COUNT: 372 X10E9/L (ref 150–450)
POTASSIUM (MMOL/L) IN SER/PLAS: 4.2 MMOL/L (ref 3.5–5.3)
PR INTERVAL: 168 MS
PROTHROMBIN TIME (PT) IN PPP BY COAGULATION ASSAY: 15.6 SEC (ref 9.8–13.4)
Q ONSET: 223 MS
QRS COUNT: 11 BEATS
QRS DURATION: 96 MS
QT INTERVAL: 420 MS
QTC CALCULATION(BAZETT): 450 MS
QTC FREDERICIA: 440 MS
R AXIS: 48 DEGREES
RH FACTOR: NORMAL
SODIUM (MMOL/L) IN SER/PLAS: 140 MMOL/L (ref 136–145)
T AXIS: 34 DEGREES
T OFFSET: 433 MS
UREA NITROGEN (MG/DL) IN SER/PLAS: 7 MG/DL (ref 6–23)
VENTRICULAR RATE: 69 BPM

## 2023-03-10 LAB
ABO GROUP (TYPE) IN BLOOD: NORMAL
ANTIBODY SCREEN: NORMAL
BLOOD CULTURE: NORMAL
BLOOD CULTURE: NORMAL
GRAM STAIN: NORMAL
RH FACTOR: NORMAL
STERILE FLUID CULTURE/SMEAR: NORMAL

## 2023-03-11 LAB
ALBUMIN (G/DL) IN SER/PLAS: 2.2 G/DL (ref 3.4–5)
ANION GAP IN SER/PLAS: 8 MMOL/L (ref 10–20)
CALCIUM (MG/DL) IN SER/PLAS: 7.8 MG/DL (ref 8.6–10.6)
CARBON DIOXIDE, TOTAL (MMOL/L) IN SER/PLAS: 27 MMOL/L (ref 21–32)
CHLORIDE (MMOL/L) IN SER/PLAS: 111 MMOL/L (ref 98–107)
CREATININE (MG/DL) IN SER/PLAS: 0.56 MG/DL (ref 0.5–1.3)
ERYTHROCYTE DISTRIBUTION WIDTH (RATIO) BY AUTOMATED COUNT: 18.5 % (ref 11.5–14.5)
ERYTHROCYTE MEAN CORPUSCULAR HEMOGLOBIN CONCENTRATION (G/DL) BY AUTOMATED: 29.8 G/DL (ref 32–36)
ERYTHROCYTE MEAN CORPUSCULAR VOLUME (FL) BY AUTOMATED COUNT: 71 FL (ref 80–100)
ERYTHROCYTES (10*6/UL) IN BLOOD BY AUTOMATED COUNT: 3.4 X10E12/L (ref 4.5–5.9)
GFR MALE: >90 ML/MIN/1.73M2
GLUCOSE (MG/DL) IN SER/PLAS: 76 MG/DL (ref 74–99)
GRAM STAIN: NORMAL
HEMATOCRIT (%) IN BLOOD BY AUTOMATED COUNT: 24.2 % (ref 41–52)
HEMOGLOBIN (G/DL) IN BLOOD: 7.2 G/DL (ref 13.5–17.5)
LEUKOCYTES (10*3/UL) IN BLOOD BY AUTOMATED COUNT: 9.5 X10E9/L (ref 4.4–11.3)
MAGNESIUM (MG/DL) IN SER/PLAS: 1.73 MG/DL (ref 1.6–2.4)
NRBC (PER 100 WBCS) BY AUTOMATED COUNT: 0 /100 WBC (ref 0–0)
PHOSPHATE (MG/DL) IN SER/PLAS: 3.2 MG/DL (ref 2.5–4.9)
PLATELETS (10*3/UL) IN BLOOD AUTOMATED COUNT: 376 X10E9/L (ref 150–450)
POTASSIUM (MMOL/L) IN SER/PLAS: 4.3 MMOL/L (ref 3.5–5.3)
SODIUM (MMOL/L) IN SER/PLAS: 142 MMOL/L (ref 136–145)
TISSUE/WOUND CULTURE/SMEAR: NORMAL
UREA NITROGEN (MG/DL) IN SER/PLAS: 13 MG/DL (ref 6–23)

## 2023-03-12 LAB
ABO GROUP (TYPE) IN BLOOD: NORMAL
ALBUMIN (G/DL) IN SER/PLAS: 2.6 G/DL (ref 3.4–5)
ANION GAP IN SER/PLAS: 10 MMOL/L (ref 10–20)
ANTIBODY SCREEN: NORMAL
CALCIUM (MG/DL) IN SER/PLAS: 8.2 MG/DL (ref 8.6–10.6)
CARBON DIOXIDE, TOTAL (MMOL/L) IN SER/PLAS: 26 MMOL/L (ref 21–32)
CHLORIDE (MMOL/L) IN SER/PLAS: 108 MMOL/L (ref 98–107)
CREATININE (MG/DL) IN SER/PLAS: 0.57 MG/DL (ref 0.5–1.3)
ERYTHROCYTE DISTRIBUTION WIDTH (RATIO) BY AUTOMATED COUNT: 19.6 % (ref 11.5–14.5)
ERYTHROCYTE MEAN CORPUSCULAR HEMOGLOBIN CONCENTRATION (G/DL) BY AUTOMATED: 29.7 G/DL (ref 32–36)
ERYTHROCYTE MEAN CORPUSCULAR VOLUME (FL) BY AUTOMATED COUNT: 72 FL (ref 80–100)
ERYTHROCYTES (10*6/UL) IN BLOOD BY AUTOMATED COUNT: 3.67 X10E12/L (ref 4.5–5.9)
GFR MALE: >90 ML/MIN/1.73M2
GLUCOSE (MG/DL) IN SER/PLAS: 81 MG/DL (ref 74–99)
GRAM STAIN: ABNORMAL
HEMATOCRIT (%) IN BLOOD BY AUTOMATED COUNT: 26.3 % (ref 41–52)
HEMOGLOBIN (G/DL) IN BLOOD: 7.8 G/DL (ref 13.5–17.5)
LEUKOCYTES (10*3/UL) IN BLOOD BY AUTOMATED COUNT: 7.3 X10E9/L (ref 4.4–11.3)
NRBC (PER 100 WBCS) BY AUTOMATED COUNT: 0 /100 WBC (ref 0–0)
PHOSPHATE (MG/DL) IN SER/PLAS: 3.8 MG/DL (ref 2.5–4.9)
PLATELETS (10*3/UL) IN BLOOD AUTOMATED COUNT: 398 X10E9/L (ref 150–450)
POTASSIUM (MMOL/L) IN SER/PLAS: 4.7 MMOL/L (ref 3.5–5.3)
RH FACTOR: NORMAL
SODIUM (MMOL/L) IN SER/PLAS: 139 MMOL/L (ref 136–145)
TISSUE/WOUND CULTURE/SMEAR: ABNORMAL
UREA NITROGEN (MG/DL) IN SER/PLAS: 15 MG/DL (ref 6–23)
VANCOMYCIN (UG/ML) IN SER/PLAS - TROUGH: 11.2 UG/ML (ref 5–20)

## 2023-03-13 LAB
ALBUMIN (G/DL) IN SER/PLAS: 2.4 G/DL (ref 3.4–5)
ALBUMIN (G/DL) IN SER/PLAS: NORMAL
ANION GAP IN SER/PLAS: 9 MMOL/L (ref 10–20)
ANION GAP IN SER/PLAS: NORMAL
CALCIUM (MG/DL) IN SER/PLAS: 8.1 MG/DL (ref 8.6–10.6)
CALCIUM (MG/DL) IN SER/PLAS: NORMAL
CARBON DIOXIDE, TOTAL (MMOL/L) IN SER/PLAS: 27 MMOL/L (ref 21–32)
CARBON DIOXIDE, TOTAL (MMOL/L) IN SER/PLAS: NORMAL
CHLORIDE (MMOL/L) IN SER/PLAS: 108 MMOL/L (ref 98–107)
CHLORIDE (MMOL/L) IN SER/PLAS: NORMAL
CREATININE (MG/DL) IN SER/PLAS: 0.55 MG/DL (ref 0.5–1.3)
CREATININE (MG/DL) IN SER/PLAS: NORMAL
ERYTHROCYTE DISTRIBUTION WIDTH (RATIO) BY AUTOMATED COUNT: 20.2 % (ref 11.5–14.5)
ERYTHROCYTE DISTRIBUTION WIDTH (RATIO) BY AUTOMATED COUNT: NORMAL
ERYTHROCYTE MEAN CORPUSCULAR HEMOGLOBIN CONCENTRATION (G/DL) BY AUTOMATED: 30.4 G/DL (ref 32–36)
ERYTHROCYTE MEAN CORPUSCULAR HEMOGLOBIN CONCENTRATION (G/DL) BY AUTOMATED: NORMAL
ERYTHROCYTE MEAN CORPUSCULAR VOLUME (FL) BY AUTOMATED COUNT: 72 FL (ref 80–100)
ERYTHROCYTE MEAN CORPUSCULAR VOLUME (FL) BY AUTOMATED COUNT: NORMAL
ERYTHROCYTES (10*6/UL) IN BLOOD BY AUTOMATED COUNT: 3.29 X10E12/L (ref 4.5–5.9)
ERYTHROCYTES (10*6/UL) IN BLOOD BY AUTOMATED COUNT: NORMAL
GFR FEMALE: NORMAL
GFR MALE: >90 ML/MIN/1.73M2
GFR MALE: NORMAL
GLUCOSE (MG/DL) IN SER/PLAS: 56 MG/DL (ref 74–99)
GLUCOSE (MG/DL) IN SER/PLAS: NORMAL
HEMATOCRIT (%) IN BLOOD BY AUTOMATED COUNT: 23.7 % (ref 41–52)
HEMATOCRIT (%) IN BLOOD BY AUTOMATED COUNT: NORMAL
HEMOGLOBIN (G/DL) IN BLOOD: 7.2 G/DL (ref 13.5–17.5)
HEMOGLOBIN (G/DL) IN BLOOD: NORMAL
LEUKOCYTES (10*3/UL) IN BLOOD BY AUTOMATED COUNT: 6.8 X10E9/L (ref 4.4–11.3)
LEUKOCYTES (10*3/UL) IN BLOOD BY AUTOMATED COUNT: NORMAL
NRBC (PER 100 WBCS) BY AUTOMATED COUNT: 0 /100 WBC (ref 0–0)
NRBC (PER 100 WBCS) BY AUTOMATED COUNT: NORMAL
PHOSPHATE (MG/DL) IN SER/PLAS: 3.4 MG/DL (ref 2.5–4.9)
PHOSPHATE (MG/DL) IN SER/PLAS: NORMAL
PLATELETS (10*3/UL) IN BLOOD AUTOMATED COUNT: 371 X10E9/L (ref 150–450)
PLATELETS (10*3/UL) IN BLOOD AUTOMATED COUNT: NORMAL
POCT GLUCOSE: 49 MG/DL (ref 74–99)
POCT GLUCOSE: 87 MG/DL (ref 74–99)
POTASSIUM (MMOL/L) IN SER/PLAS: 4.3 MMOL/L (ref 3.5–5.3)
POTASSIUM (MMOL/L) IN SER/PLAS: NORMAL
SODIUM (MMOL/L) IN SER/PLAS: 140 MMOL/L (ref 136–145)
SODIUM (MMOL/L) IN SER/PLAS: NORMAL
UREA NITROGEN (MG/DL) IN SER/PLAS: 14 MG/DL (ref 6–23)
UREA NITROGEN (MG/DL) IN SER/PLAS: NORMAL

## 2023-03-14 LAB
POCT GLUCOSE: 56 MG/DL (ref 74–99)
POCT GLUCOSE: 79 MG/DL (ref 74–99)
POCT GLUCOSE: 86 MG/DL (ref 74–99)
VANCOMYCIN (UG/ML) IN SER/PLAS - TROUGH: NORMAL
VANCOMYCIN (UG/ML) IN SER/PLAS: 14.7 UG/ML

## 2023-03-15 LAB
POCT GLUCOSE: 108 MG/DL (ref 74–99)
POCT GLUCOSE: 183 MG/DL (ref 74–99)
POCT GLUCOSE: 69 MG/DL (ref 74–99)
POCT GLUCOSE: 74 MG/DL (ref 74–99)
POCT GLUCOSE: 85 MG/DL (ref 74–99)

## 2023-03-16 LAB
ALBUMIN (G/DL) IN SER/PLAS: 2.6 G/DL (ref 3.4–5)
ANION GAP IN SER/PLAS: 9 MMOL/L (ref 10–20)
CALCIUM (MG/DL) IN SER/PLAS: 8.3 MG/DL (ref 8.6–10.6)
CARBON DIOXIDE, TOTAL (MMOL/L) IN SER/PLAS: 28 MMOL/L (ref 21–32)
CHLORIDE (MMOL/L) IN SER/PLAS: 108 MMOL/L (ref 98–107)
CREATININE (MG/DL) IN SER/PLAS: 0.51 MG/DL (ref 0.5–1.3)
ERYTHROCYTE DISTRIBUTION WIDTH (RATIO) BY AUTOMATED COUNT: 21.7 % (ref 11.5–14.5)
ERYTHROCYTE MEAN CORPUSCULAR HEMOGLOBIN CONCENTRATION (G/DL) BY AUTOMATED: 29.5 G/DL (ref 32–36)
ERYTHROCYTE MEAN CORPUSCULAR VOLUME (FL) BY AUTOMATED COUNT: 73 FL (ref 80–100)
ERYTHROCYTES (10*6/UL) IN BLOOD BY AUTOMATED COUNT: 3.58 X10E12/L (ref 4.5–5.9)
GFR MALE: >90 ML/MIN/1.73M2
GLUCOSE (MG/DL) IN SER/PLAS: 93 MG/DL (ref 74–99)
HEMATOCRIT (%) IN BLOOD BY AUTOMATED COUNT: 26.1 % (ref 41–52)
HEMOGLOBIN (G/DL) IN BLOOD: 7.7 G/DL (ref 13.5–17.5)
LEUKOCYTES (10*3/UL) IN BLOOD BY AUTOMATED COUNT: 6.1 X10E9/L (ref 4.4–11.3)
NRBC (PER 100 WBCS) BY AUTOMATED COUNT: 0 /100 WBC (ref 0–0)
PHOSPHATE (MG/DL) IN SER/PLAS: 3.1 MG/DL (ref 2.5–4.9)
PLATELETS (10*3/UL) IN BLOOD AUTOMATED COUNT: 393 X10E9/L (ref 150–450)
POCT GLUCOSE: 140 MG/DL (ref 74–99)
POCT GLUCOSE: 90 MG/DL (ref 74–99)
POCT GLUCOSE: 93 MG/DL (ref 74–99)
POTASSIUM (MMOL/L) IN SER/PLAS: 4 MMOL/L (ref 3.5–5.3)
SODIUM (MMOL/L) IN SER/PLAS: 141 MMOL/L (ref 136–145)
UREA NITROGEN (MG/DL) IN SER/PLAS: 10 MG/DL (ref 6–23)

## 2023-03-17 LAB
GRAM STAIN: ABNORMAL
TISSUE/WOUND CULTURE/SMEAR: ABNORMAL

## 2023-03-18 LAB — VANCOMYCIN (UG/ML) IN SER/PLAS - TROUGH: NORMAL

## 2023-09-14 NOTE — H&P
History & Physical Reviewed:   I have reviewed the History and Physical dated:  06-Mar-2023   History and Physical reviewed and relevant findings noted. Patient examined to review pertinent physical  findings.: No significant changes   Home Medications Reviewed: no changes noted   Allergies Reviewed: no changes noted       ERAS (Enhanced Recovery After Surgery):  ·  ERAS Patient: no     Consent:   COVID-19 Consent:  ·  COVID-19 Risk Consent Surgeon has reviewed key risks related to the risk of noam COVID-19 and if they contract COVID-19 what the risks are.     Attestation:   Note Completion:  I am a:  Resident/Fellow   Attending Attestation I saw and evaluated the patient.  I personally obtained the key and critical portions of the history and physical exam or was physically present for key and  critical portions performed by the resident/fellow. I reviewed the resident/fellow?s documentation and discussed the patient with the resident/fellow.  I agree with the resident/fellow?s medical decision making as documented in the note.     I personally evaluated the patient on 10-Mar-2023         Electronic Signatures:  Alfredo Jack (Resident))  (Signed 09-Mar-2023 14:03)   Authored: History & Physical Reviewed, ERAS, Consent,  Note Completion  Lorenzo Romeo)  (Signed 10-Mar-2023 13:09)   Authored: Note Completion   Co-Signer: History & Physical Reviewed, ERAS, Consent, Note Completion      Last Updated: 10-Mar-2023 13:09 by Lorenzo Romeo)

## 2023-09-14 NOTE — DISCHARGE SUMMARY
Send Summary:   Discharge Summary Providers:  Provider Role Provider Name   · Referring Correct Info, Needed   · Attending Lorenzo Romeo   · Consulting Roger Huertas   · Consulting Jered Sellers   · Primary Ajay Sullivan   · Consulting José Viramontes       Note Recipients: Ajay Sullivan MD - [id number]  []  ELISA GROSS - Fax: [phone]       Discharge:    Summary:   Admission Date: .06-Mar-2023 20:10:00   Discharge Date: 16-Mar-2023   Attending Physician at Discharge: Lorenzo Romeo   Admission Reason: fall (1)   Final Discharge Diagnoses: Wound dehiscence   Nutrition Diagnosis:      Agree with dietitian?s assessment and diagnoses as stated.  A new diagnosis of Moderate malnutrition related to chronic disease or condition related  to complex medical problems and increased metabolic demand  as evidence by moderate muscle mass loss and moderate loss of subcutaneous fat.  Procedures: Date: 10-Mar-2023 15:24:00  Procedure Name: 1. Wound exploration, washout, removal of C7 and T1 spinous process    Date: 10-Mar-2023 16:12:00  Procedure Name: 1. Extensive debridement   2. Adjacent tissue repair with rotation-advancement paraspinal-trapezius superiorly based muscle flap  3.Complex closure   4.   5.   Condition at Discharge: Satisfactory   Disposition at Discharge: Home Health Care - New   Vital Signs:        T   P  R  BP   MAP  SpO2   Value  36.2  67  20  110/67      100%  Date/Time 3/16 7:30 3/16 7:30 3/16 7:30 3/16 7:30    3/16 7:30  Range  (36.2C - 36.8C )  (66 - 89 )  (18 - 20 )  (109 - 129 )/ (67 - 78 )    (96% - 100% )    Date:            Weight/Scale Type:  Height:   10-Mar-2023 13:01  83  kg         188  cm  Physical Exam:    PE:  Gen: A&Ox3  HEENT: Normocephalic, atraumatic;  CVS: No JVD;  Pulm: Chest expansion symmetric;  Abd: Soft, NT/ND,;  Ext: RLE +1 edema, contracture;   Neuro: A&Ox3            Incision intact;            RUE: D4, B/T4+, HG/IO 4+            LUE: D4+, B/T 4+, HG/IO 4+;             RLE: 4;            LLE 4+;  Integ: Warm, dry, incision intact;  Psych: Mood appropriate;    Hospital Course:    53 yo male with a history of asthma, hypothyroidism, MSSA bactremia, s/p remote gastric bypass surgery with current moderate malnourishment s/p prior PEG tube (follows  with [facility] Bariatric team), cervical myelopathy s/p C5-7 ACDF, C3-5 laminoplasty ([facility] 2020), s/p thoracic SCS, s/p lumbar surgery, fibromyalgia, persistent cervical stenosis 9/9/22 s/p removal of laminoplasty hardware, C3-6 PCDF, c/b cervical osteomyelitis  11/2/22 s/p C4 corpectomy, C2-T2 presented 3/6/23 with exposed spinous process.  CT Cervical Spine showed harware in correct position, R T2 screw lucency with good fusion mass.  Patient taken to the OR 3/10/23 for a cervical wound revision w/ plastics  with rotational muscle flap; OR cultures + for proteus mirabillis, Staph aureus and Corynebacterium.  ID consulted and recommended 8 weeks IV Vancomycin and ceftriaxone.  Plastic surgery removed drains 3/14/23.  PICC placed.  PT/OT evaluated patient with  recommendation for HHC.  Patient was discharged to home in satisfactory condition with scheduled follow up and HC services.    Immunizations:    Immunizations:  10-Sep-2022   .Influenza- Influenza Virus: Immunizations, 10-Sep-2022  10-Nov-2022   PCV- Pneumococcal conjugate vaccine: Immunizations, 10-Nov-2022      Discharge Information:    and Continuing Care:   Lab Results - Pending:    None  Radiology Results - Pending: None   Discharge Instructions:    Activity:           activity as tolerated.          May shower..            May not drive while taking narcotics.  and until cleared at follow up appointment          No pushing, pulling, or lifting objects greater than 10 pounds until follow-up visit.            Weight-bearing Instructions: weight-bearing as tolerated.            Offload pressure from spinal wound as much as possible.          Activity as tolerated           Slowly increase your activity level. You may feel fine but your body is still recovering and needs a balance of sleep and rest. It may take a month or two before you regain the energy you had before surgery          Pain or discomfort is a guide to cut back on your activity          No heavy lifting (more than 10 pounds), pulling or pushing activity until cleared by MD at follow-up appointment          You may ride in a car with your seat belt on    Nutrition/Diet:           Additive/Supplement:   Supplements:  Ensure High Protein by mouth Three times daily       Labs:           Lab Test(s):   Basic Metabolic Panel,  CBC with dif,  CRP,  Vancomycin Trough          Date To Be Drawn:   Once weekly          Fax Results To:   Dr. Viramontes, [place] Fax: [phone]; Dr. Singh Fax: [phone]    Wound Care:           Wound Site:   Cervical Spine          Wound Type:   surgical incision          Cover With:   no dressing, leave open to air          Instructions:   no lotions, creams, or tub soaks          Other Instructions:   Leave incision open to air. May shower. Pat incision dry. Notify office immediately if developing signs of infection which include increased redness, swelling, fever/chills, green/yellow drainage, or foul odor from wound.    Plastic Surgery Line: [phone]          Inspect your incision daily for signs of infection: redness, swelling, drainage and foul discharge          DO NOT scrub the incision          Please call Dr. Caesar Perez (plastic surgery) with questions or concerns regarding surgical incision    Line Care:           Homecare ONLY Lines:   Adult          Access Type:   PICC          Line Site:   Left   Placed 3/13/2023          Homecare Line Care Orders:   - Pulse Flush each lumen with 10 mLs normal saline flush before and after medications, followed by 5 mls of 10 units per ml heparin flush.   Follow with positive displacement line clamping technique.  - If medication is not infusing,  flush each lumen daily with 10 ml normal saline followed by 5 mls of 10 units per ml heparin flush.   - Weekly sterile dressing change with chloroprep, if tolerated, and sterile gloves and mask. Use transparent dressing.  May use Tegaderm CHG or Chlorhexidine disk for high risk infection patients.  Use securement device or dressing and change weekly.  - Sterile gauze dressings are changed three times per week if transparent is not tolerated.  - Change injection cap and extension sets weekly and as needed.  Cleanse catheter hub with Site-Scrub (preferred), or alcohol pads with injection cap change.  Prime injection cap with normal saline before use.  Coordinate with dressing change, if possible.  - Tubing changes - DAILY for intermittent infusions, lipid, or blood products. Every 96 hours for continuous infusions. Maximum of 7 days for PCA or other low infection risk therapies that remain intact. Use separate tubings for different medications.  - Cathflo 2 mg IV as needed per lumen for sluggish lines, loss of, or poor blood return. Stopcock or Vertical Syringe method per RN discretion for completely occluded lines.  Dwell time 2 hours whenever possible. Maximum daily dose is 4 mg for a single  lumen and 8 mg for a double lumen catheter.   - RN to record number of lumens, size and length of catheter, and external catheter length.   Assess weekly and as needed. Assess baseline mid upper arm circumference at start of care and prn for signs and symptoms of edema and DVT.  Record on dressing  and in clinical note.  - LINE may remain in place with a valid need, if patency is maintained and there is no migration, pain, redness, swelling, or signs and symptoms of infection, and site is clean and dry. Notify MD of any line complications.  - Blood return must be confirmed prior to TPN or chemotherapy administration.  Blood return is to be checked at least daily for all infusions. Refer to  Cathflo order for loss of blood  return.  - Avoid using syringes smaller than 10 mLs unless patency has been verified.  - Do not use line arm for BP or phlebotomy.   - May use line for labs if not contraindicated.  Double the saline volume after a lab draw.   -RN may remove lines placed in the arm, at completion of therapy, if the PICC length is known, unless contraindicated.       Rehab Services:           Occupational Therapy Orders:   Eval and Treat ([facility])   daily          Physical Therapy Orders:   Eval and Treat ([facility])   daily    Home Care Certification:           Home Care Agency:   Other (with phone number)   [facility] [phone] and [facility] [phone]          Skilled Disciplines Ordered:   RN/LPN,  PT,  OT    Home Care Services:           Home Care Skilled Service:   infusions/injectables/medications,  IV line care,  labs,  Rehab (PT/OT/SP eval and treat),  wound care          Injectable/Infusion/Medication (Name only):   Vancomycin, Ceftriaxone    Care Recommendation:           I recommend that INPATIENT care is required at::   Skilled          Estimated Stay:   Convalescent stay < 30 days    Follow Up Appointments:    Follow-Up - Spine Surgeon:           Physician/Dept/Service:   Surgeon   Dr. Lorenzo Romeo          Scheduled Date/Time:   25-Apr-2023 10:00          Location:   [address] [place] [address], [address] [facility]          Phone Number:   [phone]    Follow-Up Appointment 01:           Physician/Dept/Service:   Dr. Caesar Perez, Plastic and Reconstructive Surgery          Reason for Referral:   Post Operative Appointment          Scheduled Date/Time:   27-Mar-2023 14:30          Location:   [facility] 2nd floor [address]          Phone Number:   [phone]    Follow-Up Appointment 02:           Physician/Dept/Service:   Dr. José Viramontes - Infectious Disease          Scheduled Date/Time:   28-Jul-2023 08:00           Location:   [address] [place] [address] [address] [place] [address]          Phone Number:   [phone]    Discharge Medications: Home Medication   pyridostigmine 60 mg oral tablet - 1 tab(s) orally 3 times a day  pregabalin 150 mg oral capsule - 1 cap(s) orally 3 times a day  baclofen 20 mg oral tablet - 1 tab(s) orally 4 times a day  levothyroxine 100 mcg (0.1 mg) oral tablet - 1 tab(s) orally once a day  nystatin 100,000 units/mL oral suspension - 4 milliliter(s) orally 4 times a day  arformoterol 15 mcg/2 mL inhalation solution - 2 milliliter(s) inhaled 2 times a day  omeprazole 40 mg oral delayed release capsule - 1 cap(s) orally once a day  QUEtiapine 400 mg oral tablet - 2 tab(s) orally once a day (at bedtime)  sucralfate 1 g/10 mL oral suspension - 10 milliliter(s) orally 4 times a day (before meals and at bedtime)   traZODone 150 mg oral tablet - 1 tab(s) orally once a day (at bedtime)  ferrous gluconate 325 mg (36 mg elemental iron) oral tablet - 1 tab(s) orally once a day  Multiple Vitamins with Iron oral tablet - 3 tab(s) orally once a day  Nurtec ODT 75 mg oral tablet, disintegrating - 1 tab(s) orally once a day  lithium 300 mg oral tablet, extended release - 1 tab(s) orally 2 times a day  cefTRIAXone 2 g/50 mL-iso-osmotic dextrose intravenous solution - 2 grams IVPB Every 24 hours - to Stop 5/5/23 or per infectious disease    Dispense: 49 Two-gram doses (98 grams)  vancomycin 1.5 g/150 mL-NaCl 0.9% intravenous solution - 1.5 gram(s) intravenously every 12 hours -  - to Stop 5/5/23 or per infectious disease    Dispense: 98 1.5 Gram doses (147 grams total)  acetaminophen 325 mg oral tablet - 2 tab(s) orally every 6 hours while having post-operative pain  Symbicort 160 mcg-4.5 mcg/inh inhalation aerosol - 2 puff(s) inhaled 2 times a day  droNABinol 5 mg oral capsule - 1 cap(s) orally 2 times a day  Florastor 250 mg oral capsule - 1 cap(s) orally 2 times a day  cephalexin 500 mg oral capsule - 1 cap(s) orally 4  "times a day - Do not take this medication while on IV antibiotics.  oxyCODONE 5 mg oral tablet - 1 tab(s) orally every 6 hours as needed for pain    ICD-10: G89.18  docusate-senna 50 mg-8.6 mg oral tablet - 2 tab(s) orally 2 times a day while taking oxycodone     PRN Medication   loperamide 2 mg oral capsule - 1 cap(s) orally 4 times a day, As Needed - for diarrhea  cyclobenzaprine 10 mg oral tablet - 1 tab(s) orally 2 times a day, As Needed - for muscle spasms  risperiDONE 1 mg oral tablet - 1 tab(s) orally 2 times a day, As Needed  hydrOXYzine hydrochloride 25 mg oral tablet - 1-2 tab(s) orally every 6 hours, As Needed - for anxiety  LORazepam 0.5 mg oral tablet - 1 tab(s) orally once a day, As Needed - for anxiety  albuterol 90 mcg/inh inhalation aerosol - 2 puff(s) inhaled every 4 hours, As Needed - for shortness of breath  albuterol 2.5 mg/3 mL (0.083%) inhalation solution - 2.5 milliliter(s) inhaled every 4 hours, As Needed - for shortness of breath, for wheezing     DNR Status:   ·  Code Status Code Status order at time of discharge: Full Code     Attestation:   Note Completion:  Provider/Team Pager # Liset Gar CNP  Total time spent in coordination of care and counseling patient 45 minutes.         Electronic Signatures:  Liset Gar (APRN-CNP)  (Signed 17-Mar-2023 06:59)   Authored: Send Summary, Summary Content, Immunizations,  Ongoing Care, DNR Status, Note Completion      Last Updated: 17-Mar-2023 06:59 by Liset Gar (APRN-CNP)    References:  1.  Data Referenced From \"History and Physical - Neuro-Surgery\" 07-Mar-2023 03:32   "

## 2023-09-14 NOTE — PROGRESS NOTES
Service: Plastic Surgery     Subjective Data:   MANUEL GARCIA is a 52 year old Male who is Hospital Day # 9 and POD #3 for 1. Paraspinous muscle flap;2. Complex closure;3. ;4. ;5.     Patient continues to note burning pain that spans across his upper back, mild improvement over time. No ROM issues. Anticipated DC potentially today pending finalized ID recs. Had PICC line placed yesterday  afternoon. Denies chest pain, SOB, abd pain, n/v/d.    Objective Data:     Objective Information:      T   P  R  BP   MAP  SpO2   Value  36.5  67  20  108/60      95%  Date/Time 3/14 7:51 3/14 7:51 3/14 7:51 3/14 7:51    3/14 7:51  Range  (35.5C - 37.2C )  (66 - 87 )  (18 - 20 )  (97 - 127 )/ (54 - 80 )    (95% - 98% )  Highest temp of 37.2 C was recorded at 3/14 5:00      Pain reported at 3/14 2:40: sleeping    Physical Exam by System:    Constitutional: NAD.   Eyes: EOMI. Sclera clear.   ENMT: MMM, no lesion/ulcer.   Head/Neck: NC/AT.   Respiratory/Thorax: Unlabored respiration on RA.   Genitourinary: Voiding independently.   Musculoskeletal: Chronic decreased cervical spine  ROM. Cervical incision C/D/I. Tissue surrounding incision line without erythema or edema.  x2 THUY drains exiting from both sides of cervical spine with scant serous output.   Neurological: A&O x3.   Psychological: Appropriate mood/behavior.   Skin: Warm, dry and intact. See musculoskeletal exam.     Medication:    Medications:          Continuous Medications       --------------------------------  No continuous medications are active       Scheduled Medications       --------------------------------    1. Acetaminophen:  650  mg  Oral  Every 6 Hours    2. Albuterol 2.5 mg/ 3 mL Nebulizer Soln:  3  mL  Inhalation  4 Times a Day    3. Ascorbic Acid:  500  mg  Oral  2 Times a Day    4. Baclofen:  20  mg  Oral  4 Times a Day    5. Calcium Carbonate Chewable:  500  mg  Oral  Every 12 Hours    6. Cefepime IV Piggy Back:  2  gram(s)  IntraVenous Piggyback   Every 8 Hours    7. Cyanocobalamin:  500  microgram(s)  Oral  Daily    8. Dronabinol:  5  mg  Oral  2 Times a Day    9. Formoterol 20 microgram/ 2 mL Neb Soln:  2  mL  Inhalation  Every 12 Hours    10. Heparin SubCutaneous:  5000  unit(s)  SubCutaneous  Every 8 Hours    11. Levothyroxine:  100  microgram(s)  Oral  Daily    12. Lidocaine 4% TransDermal:  1  patch  TransDermal  Every 24 Hours    13. Lithium Carbonate Extended Release:  300  mg  Oral  Every 12 Hours    14. Multivitamin with Minerals Naresh Chewable:  2  tablet(s)  Oral  Daily    15. Pantoprazole:  40  mg  Oral  Daily    16. Polyethylene Glycol:  17  gram(s)  Oral  Daily    17. Pregabalin:  150  mg  Oral  3 Times a Day    18. Pyridostigmine:  60  mg  Oral  3 Times a Day    19. QUEtiapine:  800  mg  Oral  Daily    20. traZODone:  150  mg  Oral  At Bedtime    21. Vancomycin - RPh to Dose - IV Piggy Back:  1  each  As Specified  Variable    22. Vancomycin IV Piggy Back:  1500  mg  IntraVenous Piggyback  Every 12 Hours         PRN Medications       --------------------------------    1. Cyclobenzaprine:  10  mg  Oral  3 Times a Day    2. Heparin Flush 10 unit/ mL PF Injectable PRN:  5  mL  IntraVenous Flush  According to Flush Policy    3. hydrOXYzine Hydrochloride (ATARAX):  25  mg  Oral  Every 6 Hours    4. LORazepam:  1  mg  Oral  Every 8 Hours    5. Ondansetron Injectable:  4  mg  IntraVenous Push  Every 6 Hours    6. oxyCODONE Immediate Release:  2.5  mg  Oral  Every 4 Hours    7. oxyCODONE Immediate Release:  10  mg  Oral  Every 4 Hours    8. oxyCODONE Immediate Release:  5  mg  Oral  Every 4 Hours    9. risperiDONE (RISPERDAL):  1  mg  Oral  2 Times a Day    10. Sennosides:  1  tablet(s)  Oral  2 Times a Day    11. Sodium Chloride 0.9% Injectable Flush PRN:  10  mL  IntraVenous Flush  According to Flush Policy    12. Sodium Chloride 0.9% Injectable Flush PRN:  20  mL  IntraVenous Flush  According to Flush Policy        Assessment and Plan:   Daily  Risk Screen:  ·  Does patient have a central line? n/a consulting service     Comorbidities:  ·  Comorbidity Other     Code Status:  ·  Code Status Full Code     Assessment:    MANUEL GARCIA is a 52 year old Male with PMH of lumbar fusion and cervical myelopathy and stenosis s/p multiple spinal surgeries: C5-7 ACDF, C3-5 laminoplasty ([facility]  2020) with persistent cervical stenosis s/p removal of laminoplasty hardware, C3-6 PCDF (9/9/22 per NSGY, Dr. Romeo) c/b cervical osteomyelitis s/p C4 corpectomy, C2-T2 PCDF (11/2/22 per NSGY, Dr. Romeo). Presented to [facility] ED on 3/6/23 for evaluation  of cervical spine wound dehiscence/drainage and RLE discomfort and swelling following a mechanical fall at home. Plastic Surgery service consulted per NSGY for evaluation of patient's cervical spinal wound with consideration for assistance with soft tissue  coverage and definitive wound closure. Ultimately taken to the operating room on 3/10 for wound exploration, washout, removal of C7 and T1 spinous process per neurosurgery as well as paraspinal muscle flap and complex closure per plastic surgery.     Plan/Recommendations:   S/p paraspinal muscle flap, complex closure   - Cervical incision JEREMIAH  - x2 THUY drains to cervical spine removed per plastic surgery at bedside without complication 3/14   - Operative cultures 3/10: proteus mirabilis    - ID following, appreciate recs        · IV vancomycin, IV cefepime   - Continue pressure offload from cervical region   - Recommend nutrition optimization to promote wound healing if not contraindicated        · Zinc sulfate 220mg PO daily       · Vitamin C 500 mg PO daily       · Multivitamin daily       · Protein supplements (i.e: Ensure) TID  - Appreciate remaining supportive care per primary service  - Okay for discharge from plastic surgery perspective once medically stable and ID recommendations finalized    - Follow up appointment to be requested closer to anticipated date of  discharge   - Plastic surgery will continue to follow in post operative period     Patient and plan discussed with SONY TuckerC   Plastic and Reconstructive Surgery   Available via [employer] [employer], pager: [phone] or Team phones: [phone]     Time spent on the assessment of patient, gathering and interpreting data, review of medical record/patient history, personally reviewing radiographic imaging and formulation of this note 30 minutes. With greater than 50% spent in personal discussion with  patient.         Electronic Signatures:  Carlene Abernathy (PAC)  (Signed 14-Mar-2023 10:14)   Authored: Service, Subjective Data, Objective Data, Assessment  and Plan, Note Completion      Last Updated: 14-Mar-2023 10:14 by Carlene Abernathy (PAC)

## 2023-09-14 NOTE — PROGRESS NOTES
Service: Plastic Surgery     Subjective Data:   MANUEL GARCIA is a 52 year old Male who is Hospital Day # 6 and POD #1 for 1. Paraspinous muscle flap;2. Complex closure;3. ;4. ;5.     NAD. Notes incisional pain. Denies fever, chest pain, SOB, abd pain, n/v/d.    Objective Data:     Objective Information:      T   P  R  BP   MAP  SpO2   Value  36.3  85  18  121/74      97%  Date/Time 3/11 12:42 3/11 12:42 3/11 12:42 3/11 12:42    3/11 12:42  Range  (36.2C - 36.7C )  (66 - 93 )  (18 - 18 )  (99 - 121 )/ (50 - 76 )    (94% - 100% )      Pain reported at 3/11 7:03: 7 = Severe    Physical Exam by System:    Constitutional: NAD   Eyes: EOMI. Sclera clear.   ENMT: MMM, no lesion/ulcer.   Head/Neck: NC/AT.   Respiratory/Thorax: Unlabored respiration on RA.   Genitourinary: Voiding independently.   Musculoskeletal: Chronic decreased cervical spine  ROM. Cervical incision c/d/i.  x2 THUY drains exiting from both sides of cervical spine with scant bloody serosanguineous output.   Extremities: Lower extremity edema (R>L).   Neurological: A&O x3, without gross neuro deficits.   Psychological: Appropriate mood/behavior.   Skin: Warm, dry and intact. See musculoskeletal exam.     Recent Lab Results:    Results:    CBC: 3/11/2023 06:43              \     Hgb     /                              \     7.2 L    /  WBC  ----------------  Plt               9.5       ----------------    376              /     Hct     \                              /     24.2 L    \            RBC: 3.40 L    MCV: 71 L          RFP: 3/11/2023 06:43  NA+        Cl-     BUN  /                         142    111 H   13  /  --------------------------------  Glucose                ---------------------------  76    K+     HCO3-   Creat \                         4.3    27    0.56  \  Calcium : 7.8 LAnion Gap : 8 L          Albumin : 2.2 L    Phos : 3.2      Assessment and Plan:   Comorbidities:  ·  Comorbidity Other     Code Status:  ·  Code Status  Full Code     Assessment:    MANUEL GARCIA is a 52 year old Male with PMH of lumbar fusion and cervical myelopathy and stenosis s/p multiple spinal surgeries: C5-7 ACDF, C3-5 laminoplasty ([facility]  2020) with persistent cervical stenosis s/p removal of laminoplasty hardware, C3-6 PCDF (9/9/22 per NSGY, Dr. Romeo) c/b cervical osteomyelitis s/p C4 corpectomy, C2-T2 PCDF (11/2/22 per NSGY, Dr. Romeo). Presented to [facility] ED on 3/6/23 for evaluation  of cervical spine wound dehiscence/drainage and RLE discomfort and swelling following a mechanical fall at home. Plastic Surgery service consulted per NSGY for evaluation of patient's cervical spinal wound with consideration for assistance with soft tissue  coverage and definitive wound closure. Ultimately taken to the operating room on 3/10 for wound exploration, washout, removal of C7 and T1 spinous process per neurosurgery as well as paraspinal muscle flap and complex closure per plastic surgery.     Plan/Recommendations:   S/p paraspinal muscle flap, complex closure   - Cervical incision JEREMIAH  - Continue THUY drain care per nursing (x2 to cervical spine)        · Empty and record output at least every 8 hours       · Strip drains TID and PRN to avoid drain obstruction        · Removal per plastic surgery team when output <30cc output/24hrs for x2 consecutive days        · Patient okay to be discharged home with THUY drains in place   - F/u operative cultures obtained intraop 3/10   - ID following, appreciate recs   - Continue pressure offload from cervical region   - Recommend nutrition optimization to promote wound healing if not contraindicated      ·  Zinc sulfate 220mg PO daily     ·  Vitamin C 500 mg PO daily     ·  Multivitamin daily     ·  Protein supplements (i.e: Ensure) TID  - Appreciate remaining supportive care per primary service  - Okay for discharge from plastic surgery perspective once medically stable and ID recommendations finalized    - Follow up  appointment to be requested closer to anticipated date of discharge   - Plastic surgery will continue to follow in post operative period     Patient and plan discussed with Dr. Hillman PAMarijaC  Plastic and Reconstructive Surgery  Doc Halo, Pager [phone], Team phones: [phone]    Time spent on the assessment of patient, gathering and interpreting data, review of medical record/patient history, personally reviewing radiographic imaging and formulation of this note 30 minutes. With greater than 50% spent in personal discussion with  patient.          Electronic Signatures:  Zakiya Rowland (PAC)  (Signed 11-Mar-2023 13:53)   Authored: Service, Subjective Data, Objective Data, Assessment  and Plan, Note Completion      Last Updated: 11-Mar-2023 13:53 by Zakiya Rowland (PAC)

## 2023-09-14 NOTE — PROGRESS NOTES
Service: Plastic Surgery     Subjective Data:   MANUEL GARCIA is a 52 year old Male who is Hospital Day # 5 and POD #0 for 1. Paraspinous muscle flap;2. Complex closure;3. ;4. ;5.     Patient evaluated post operatively after return to [facility]. Pain under control. Denies any acute concerns. Surgical dressing intact.     Denies any fever, chills, night sweats, CP, SOB, palpitations, nausea, vomiting, or abdominal pain/discomfort.    Objective Data:     Objective Information:      T   P  R  BP   MAP  SpO2   Value  36.4  66  18  119/76      100%  Date/Time 3/10 18:00 3/10 18:00 3/10 18:00 3/10 18:00    3/10 18:00  Range  (35.9C - 37C )  (58 - 83 )  (18 - 20 )  (100 - 119 )/ (60 - 76 )    (97% - 100% )  Highest temp of 37 C was recorded at 3/9 12:32      Pain reported at 3/10 18:00: 6 = Moderate    Physical Exam by System:    Constitutional: Alert/awake, calm and cooperative.  NAD.   Eyes: EOMI. Sclera clear.   ENMT: MMM, no lesion/ulcer.   Head/Neck: NC/AT.   Respiratory/Thorax: Unlabored respiration on RA.   Genitourinary: Voiding independently.   Musculoskeletal: Chronic decreased cervical spine  ROM. Telfa island dressing to cervical spine without strikethrough bleeding or drainage. x2 THUY drains existing from either side of cervical spine with scant bloody serosanguineous output.   Extremities: Lower extremity edema (R>L).   Neurological: A&O x3, without gross neuro deficits.   Psychological: Appropriate mood/behavior.   Skin: Warm, dry and intact. See musculoskeletal exam.     Medication:    Medications:          Continuous Medications       --------------------------------  No continuous medications are active       Scheduled Medications       --------------------------------    1. Albuterol 2.5 mg/ 3 mL Nebulizer Soln:  3  mL  Inhalation  4 Times a Day    2. Ascorbic Acid:  500  mg  Oral  2 Times a Day    3. Baclofen:  20  mg  Oral  4 Times a Day    4. Cefepime IV Piggy Back:  2  gram(s)  IntraVenous Piggyback   Every 8 Hours    5. Dronabinol:  5  mg  Oral  2 Times a Day    6. Formoterol 20 microgram/ 2 mL Neb Soln:  2  mL  Inhalation  Every 12 Hours    7. Levothyroxine:  100  microgram(s)  Oral  Daily    8. Lithium Carbonate Extended Release:  300  mg  Oral  Every 12 Hours    9. Pantoprazole:  40  mg  Oral  Daily    10. Polyethylene Glycol:  17  gram(s)  Oral  Daily    11. Pregabalin:  150  mg  Oral  3 Times a Day    12. Pyridostigmine:  60  mg  Oral  3 Times a Day    13. QUEtiapine:  800  mg  Oral  Daily    14. traZODone:  150  mg  Oral  At Bedtime    15. Vancomycin - RPh to Dose - IV Piggy Back:  1  each  As Specified  Variable         PRN Medications       --------------------------------    1. Acetaminophen:  650  mg  Oral  Every 6 Hours    2. Cyclobenzaprine:  10  mg  Oral  3 Times a Day    3. HYDROmorphone Injectable:  0.2  mg  IntraVenous Push  Every 3 Hours    4. hydrOXYzine Hydrochloride (ATARAX):  25  mg  Oral  Every 6 Hours    5. LORazepam:  1  mg  Oral  Every 8 Hours    6. oxyCODONE Immediate Release:  10  mg  Oral  Every 4 Hours    7. oxyCODONE Immediate Release:  5  mg  Oral  Every 4 Hours    8. risperiDONE (RISPERDAL):  1  mg  Oral  2 Times a Day        Assessment and Plan:   Daily Risk Screen:  ·  Does patient have an indwelling urinary catheter? n/a consulting service   ·  Does patient have a central line? n/a consulting service     Comorbidities:  ·  Comorbidity Other     Code Status:  ·  Code Status Full Code     Assessment:    MANUEL GARCIA is a 52 year old Male with PMH of lumbar fusion and cervical myelopathy and stenosis s/p multiple spinal surgeries: C5-7 ACDF, C3-5 laminoplasty ([facility]  2020) with persistent cervical stenosis s/p removal of laminoplasty hardware, C3-6 PCDF (9/9/22 per [facility], Dr. Romeo) c/b cervical osteomyelitis s/p C4 corpectomy, C2-T2 PCDF (11/2/22 per Dr. Ousmane CARREON). Presented to [facility] ED on 3/6/23 for evaluation  of cervical spine wound dehiscence/drainage and RLE  discomfort and swelling following a mechanical fall at home. Plastic Surgery service consulted per NSGY for evaluation of patient's cervical spinal wound with consideration for assistance with soft tissue  coverage and definitive wound closure. Ultimately taken to the operating room on 3/10 for wound exploration, washout, removal of C7 and T1 spinous process per neurosurgery as well as paraspinal muscle flap and complex closure per plastic surgery.     Plan/Recommendations:   S/p paraspinal muscle flap, complex closure   - Cervical incision dressed with Telfa island dressing        · Plan to remove on POD#1 per plastic surgery APPs, then okay to leave JEREMIAH   - Continue THUY drain care per nursing (x2 to cervical spine)        · Empty and record output at least every 8 hours       · Strip drains TID and PRN to avoid drain obstruction        · Removal per plastic surgery team when output <30cc output/24hrs for x2 consecutive days        · Patient okay to be discharged home with THUY drains in place   - F/u operative cultures obtained intraop 3/10   - ID following, appreciate recs        · IV vancomycin/cefepime   - Continue pressure offload from cervical region   - Recommend nutrition optimization to promote wound healing if not contraindicated      ·  Zinc sulfate 220mg PO daily     ·  Vitamin C 500 mg PO daily     ·  Multivitamin daily     ·  Protein supplements (i.e: Ensure) TID  - Appreciate remaining supportive care per primary service  - Okay for discharge from plastic surgery perspective once medically stable and ID recommendations finalized    - Follow up appointment to be requested closer to anticipated date of discharge   - Plastic surgery will continue to follow in post operative period     Patient and plan discussed with Dr. Miguel A PA-C   Plastic and Reconstructive Surgery   Available via Doc Halo, pager: [phone] or Team phones: [phone]     Time spent on the assessment of patient,  gathering and interpreting data, review of medical record/patient history, personally reviewing radiographic imaging and formulation of this note 30 minutes. With greater than 50% spent in personal discussion with  patient.         Plan of Care Reviewed With:  Plan of Care Reviewed With: patient       Electronic Signatures:  Carlene Abernathy (PAC)  (Signed 11-Mar-2023 05:41)   Authored: Service, Subjective Data, Objective Data, Assessment  and Plan, Note Completion      Last Updated: 11-Mar-2023 05:41 by Carlene Abernathy (PAC)

## 2023-09-14 NOTE — PROGRESS NOTES
Service: Neurosurgery     Subjective Data:   MANUEL GARCIA is a 52 year old Male who is Hospital Day # 5 and POD #0 for 1. Paraspinous muscle flap;2. Complex closure;3. ;4. ;5.    Objective Data:     Objective Information:      T   P  R  BP   MAP  SpO2   Value  36.4  66  18  119/76      100%  Date/Time 3/10 18:00 3/10 18:00 3/10 18:00 3/10 18:00    3/10 18:00  Range  (35.9C - 37C )  (58 - 83 )  (18 - 20 )  (100 - 119 )/ (60 - 76 )    (97% - 100% )  Highest temp of 37 C was recorded at 3/9 12:32      Pain reported at 3/10 18:00: 6 = Moderate    Physical Exam by System:    Neurological: RUE D4 B/T4+ HG/IO 4  LUE D4+ B/T 4+ HG/IO 4+  BLE 4+     Assessment and Plan:   Comorbidities:  ·  Comorbidity Other     Code Status:  ·  Code Status Full Code     Assessment:    52 M h/o asthma, hypothyroidism, MSSA bactremia, PEG tube, cervical myelopathy s/p C5-7 ACDF, C3-5 laminoplasty ([facility] 2020), s/p thoracic SCS, s/p lumbar surgery,  fibromyalgia, persistent cervical stenosis 9/9/22 s/p removal of laminoplasty hardware, C3-6 PCDF, c/b cervical osteomyelitis 11/2/22 s/p C4 corpectomy, C2-T2, p/w exposed spinous process, fall onto R knee, BLE DVT US neg, CT CS harware in posn, R T2  screw lucency with good fusion mass      3/10 s/p cervical wound revision w/ plastics    Plan:  floor  drains per plastics  ID recs  plastics recs  SQH, SCDs    Attestation:   Note Completion:  I am a:  Resident/Fellow   Attending Attestation I reviewed the resident/fellow?s documentation and discussed the patient with the resident/fellow.  I agree with the resident/fellow?s medical  decision making as documented in the note.          Electronic Signatures:  Lorenzo Romeo)  (Signed 12-Mar-2023 16:02)   Authored: Note Completion   Co-Signer: Service, Subjective Data, Objective Data, Assessment and Plan, Note Completion  Claudio Servin (Resident))  (Signed 11-Mar-2023 03:11)   Authored: Service, Subjective Data, Objective Data,  Assessment  and Plan, Note Completion      Last Updated: 12-Mar-2023 16:02 by Lorenzo Romeo)    Living Expenses: Not on file   Food Insecurity:     Worried About [address] in the Last Year: Not on file    Mica of Food in the Last Year: Not on file   Transportation Needs:     Lack of Transportation (Medical): Not on file    Lack of Transportation (Non-Medical): Not on file   Physical Activity:     Days of Exercise per Week: Not on file    Minutes of Exercise per Session: Not on file   Stress:     Feeling of Stress : Not on file   Social Connections:     Frequency of Communication with Friends and Family: Not on file    Frequency of Social Gatherings with Friends and Family: Not on file    Attends [employer] Services: Not on file    Active Member of [address] [place] [address] or Organizations: Not on file    Attends Club or Organization Meetings: Not on file    Marital Status: Not on file   Intimate Partner Violence:     Fear of Current or Ex-Partner: Not on file    Emotionally Abused: Not on file    Physically Abused: Not on file    Sexually Abused: Not on file   Housing Stability:     Unable to Pay for Housing in the Last Year: Not on file    Number of [place] in the Last Year: Not on file    Unstable Housing in the Last Year: Not on file         Medications Prior to Admission:      Prior to Admission medications    Medication Sig Start Date End Date Taking? Authorizing Provider   Venlafaxine HCl (EFFEXOR XR PO) Take 187.5 mg by mouth daily   Yes Historical Provider, MD   atomoxetine (STRATTERA) 40 MG capsule Take 40 mg by mouth daily   Yes Historical Provider, MD   amLODIPine (NORVASC) 10 MG tablet Take 10 mg by mouth daily   Yes Historical Provider, MD   levothyroxine (SYNTHROID) 50 MCG tablet Take 50 mcg by mouth Daily   Yes Historical Provider, MD   gabapentin (NEURONTIN) 100 MG capsule Take 100 mg by mouth nightly.  2 tabs   Yes Historical Provider, MD   Cholecalciferol (VITAMIN D3) 50 MCG (2000 UT) CAPS Take by mouth daily   Yes Historical Provider, MD   calcium carbonate 600 MG TABS tablet Take 1 tablet by mouth daily   Yes Historical Provider, MD   famotidine (PEPCID) 20 MG tablet Take 20 mg by mouth as needed   Yes Historical Provider, MD   ZOCOR 20 MG tablet TAKE ONE TABLET BY MOUTH EVERY NIGHT AT BEDTIME 12/15/11  Yes Cuba Olvera MD         Allergies:  Patient has no known allergies. PHYSICAL EXAM:      /84   Pulse 80   Temp 97.9 °F (36.6 °C) (Temporal)   Resp 18   Ht 5' 3\" (1.6 m)   Wt 140 lb (63.5 kg)   SpO2 100%   BMI 24.80 kg/m²      Airway:  Airway patent with no audible stridor    Heart:  Regular rate and rhythm, No murmur noted    Lungs:  No increased work of breathing, good air exchange, clear to auscultation bilaterally, no crackles or wheezing    Abdomen:  Soft, non-distended, non-tender, no rebound tenderness or guarding, and no masses palpated    ASSESSMENT AND PLAN     Patient is a 71 y.o. female with above specified procedure planned. 1.  The patients history and physical was obtained and signed off by the pre-admission testing department. Patient seen and focused exam done today- no new changes since last physical exam on 12/7/21    2. Access to ancillary services are available per request of the provider.     THALIA Miranda - CNP     12/14/2021

## 2023-09-14 NOTE — PROGRESS NOTES
Service: Neurosurgery     Subjective Data:   MANUEL GARCIA is a 52 year old Male who is Hospital Day # 9 and POD #3 for 1. Paraspinous muscle flap;2. Complex closure;3. ;4. ;5.    Objective Data:     Objective Information:      T   P  R  BP   MAP  SpO2   Value  37.2  76  20  127/74      96%  Date/Time 3/14 5:00 3/14 5:00 3/14 5:00 3/14 5:00    3/14 5:00  Range  (35.5C - 37.2C )  (66 - 87 )  (18 - 20 )  (97 - 127 )/ (54 - 80 )    (96% - 98% )  Highest temp of 37.2 C was recorded at 3/14 5:00      Pain reported at 3/14 2:40: sleeping    Physical Exam by System:    Neurological: RUE D4 B/T4+ HG/IO 4  LUE D4+ B/T 4+ HG/IO 4+  BLE 4+     Recent Lab Results:    Results:    CBC: 3/13/2023 09:41              \     Hgb     /                              \     7.2 L    /  WBC  ----------------  Plt               6.8       ----------------    371              /     Hct     \                              /     23.7 L    \            RBC: 3.29 L    MCV: 72 L          RFP: 3/13/2023 09:41  NA+        Cl-     BUN  /                         140    108 H   14  /  --------------------------------  Glucose                ---------------------------  56 L    K+     HCO3-   Creat \                         4.3    27    0.55  \  Calcium : 8.1 LAnion Gap : 9 L          Albumin : 2.4 L    Phos : 3.4      Assessment and Plan:   Daily Risk Screen:  ·  Does patient have a central line? yes   ·  Central Line Type PICC   ·  Plan for PICC removal today? no   ·  The patient continues to require a PICC for parenteral medication     Comorbidities:  ·  Comorbidity Other     Code Status:  ·  Code Status Full Code     Assessment:    52 M h/o asthma, hypothyroidism, MSSA bactremia, PEG tube, cervical myelopathy s/p C5-7 ACDF, C3-5 laminoplasty ([facility] 2020), s/p thoracic SCS, s/p lumbar surgery,  fibromyalgia, persistent cervical stenosis 9/9/22 s/p removal of laminoplasty hardware, C3-6 PCDF, c/b cervical osteomyelitis 11/2/22 s/p C4  corpectomy, C2-T2, p/w exposed spinous process, fall onto R knee, BLE DVT US neg, CT CS harware in posn, R T2  screw lucency with good fusion mass    3/10 s/p cervical wound revision w/ plastics  3/13 s/p picc    Plan:  floor  drains per plastics  ID recs - vanc/cefe until cx final  OR Cx  plastics recs  PTOT - OP  nutrition recs  SQH, SCDs    Attestation:   Note Completion:  I am a:  Resident/Fellow   Attending Attestation I reviewed the resident/fellow?s documentation and discussed the patient with the resident/fellow.  I agree with the resident/fellow?s medical  decision making as documented in the note.          Electronic Signatures:  Lorenzo Romeo (MD)  (Signed 16-Mar-2023 08:11)   Authored: Note Completion   Co-Signer: Service, Subjective Data, Objective Data, Assessment and Plan, Note Completion  Jefe Laura (Resident))  (Signed 14-Mar-2023 07:36)   Authored: Service, Subjective Data, Objective Data, Assessment  and Plan, Note Completion      Last Updated: 16-Mar-2023 08:11 by Lorenzo Romeo (MD)

## 2023-09-14 NOTE — PROGRESS NOTES
Consult Type: subsequent visit/care     Service: Infectious Disease     Subjective Data:   MANUEL GARCIA is a 52 year old Male who is Hospital Day # 3.    Objective Data:     Objective Information:      T   P  R  BP   MAP  SpO2   Value  35.7  70  18  117/65      100%  Date/Time 3/8 12:12 3/8 12:12 3/8 12:12 3/8 12:12    3/8 12:12  Range  (35.3C - 36.9C )  (63 - 89 )  (16 - 20 )  (104 - 131 )/ (53 - 79 )    (94% - 100% )  Highest temp of 36.9 C was recorded at 3/7 2:22      Pain reported at 3/8 12:12: 8 = Severe    ---- Intake and Output  -----  Mn/Dy/Year Time  Intake   Output  Net  Mar 7, 2023 10:00 pm  0   0  0      Physical Exam by System:    Constitutional: Awake, alert, no acute distress   Head/Neck: Bandage on prior incision site   Respiratory/Thorax: Breathing comfortably on RA   Extremities: RLE with 2-3+ edema. ~1cm superficial  wound on anterior lower leg at distal end of knee surgery scar, appears pink, no surrounding erythema, no drainage, no induration.     Assessment and Plan:   Code Status:  ·  Code Status Full Code     Assessment:    Assessment:  This is a case of 52 year old male with PMH of asthma and multiple cervical spine surgeries complicated by osteomyelitis. He presented for an open wound infection that's draining brown foul-smelling fluid.   Cervical spine wound infection:   Patient is afebrile, no chills, nausea or vomiting, denied any urinary symptoms. Site of infection looks clean with decrease in the drainage amount. Nontender, nonerythematous, nonedematous.   He received 2 doses Vanc, 1 dose Zosyn. He was on Cephalexin at home since November. Infection looks like its controlled, no signs of inflammation, no fever or chills.     Plan:  ·  Continue holding antibiotics  ·  Take cultures from OR  ·  Might consider Nafcillin post-op but will follow cultures first    Attestation:   Note Completion:  I am a:  Medical Student/Acting Intern   Medical Student Attestation I, or a  resident under my supervision, was present with the medical student who participated in the documentation of this note.  I have personally seen and examined the patient and performed the medical decision-making components. I have reviewed the medical student documentation and/or resident documentation and verified the findings in the note as written with additions or exceptions  as stated in the body of this note.    I personally evaluated the patient on 08-Mar-2023   Comments/ Additional Findings    Patient with history of spinal surgery in September 2022 complicated by hardware-associated MSSA infection in November 2022 with retention of hardware.  At that time was treated with IV antibiotics while inpatient only and was discharged on oral cephalexin. Suspect infection was undertreated and thus broke through oral cephalexin coverage. Expect MSSA to be the pathogen, though should send intraop cultures  to ensure treatment failure was not due to an alternative pathogen being present. Patient likely to require indefinite oral antibiotic suppression. Not treating with rifampin due to significant drug interactions.     Small wound on RLE anterior shin at distal end of scar from prior knee surgeries. Appears to be associated with irritation from leg brace. Currently appears dry, no evidence of active infection. Would not add alternative treatment at this time.           Electronic Signatures:  Jelena Hull)  (Signed 08-Mar-2023 17:25)   Authored: Objective Data, Note Completion  Saadia Willams (MED STUD)  (Signed 08-Mar-2023 14:43)   Authored: Service, Subjective Data, Objective Data, Assessment  and Plan      Last Updated: 08-Mar-2023 17:25 by Jelena Hull)

## 2023-09-14 NOTE — PROGRESS NOTES
Service: Plastic Surgery     Subjective Data:   MANUEL GARCIA is a 52 year old Male who is Hospital Day # 4.     Sitting up in bed on assessment. Pain under control. Denies any acute concers     Denies any fever, chills, night sweats, CP, SOB, palpitations, nausea, vomiting, or abdominal pain/discomfort.    Objective Data:     Objective Information:      T   P  R  BP   MAP  SpO2   Value  37  59  18  103/70      99%  Date/Time 3/9 12:32 3/9 12:32 3/9 12:32 3/9 12:32    3/9 12:32  Range  (35.3C - 37.2C )  (59 - 89 )  (16 - 20 )  (100 - 131 )/ (54 - 79 )    (94% - 100% )  Highest temp of 37.2 C was recorded at 3/8 19:57      Pain reported at 3/9 12:32: 5 = Moderate    ---- Intake and Output  -----  Mn/Dy/Year Time  Intake   Output  Net  Mar 9, 2023 6:00 am  0   0  0      Physical Exam by System:    Constitutional: Alert/awake, calm and cooperative.  NAD   Eyes: EOMI. Sclera clear   ENMT: MMM, no lesion/ulcer   Head/Neck: NC/AT   Respiratory/Thorax: Unlabored respiration on RA   Genitourinary: Voiding independently   Musculoskeletal: Chronic decreased cervical spine  ROM. Mepilex dressing intact over cervical spinal wound, scant strikethrough observed   Extremities: Lower extremity edema (R>L)   Neurological: A&O x3, without gross neuro deficits   Psychological: Appropriate mood/behavior   Skin: Warm, dry and intact. See musculoskeletal exam     Medication:    Medications:      CENTRAL NERVOUS SYSTEM AGENTS:    1. Acetaminophen:  650  mg  Oral  Every 6 Hours   PRN       2. oxyCODONE Immediate Release:  5  mg  Oral  Every 4 Hours   PRN       3. lamoTRIgine (LAMICTAL):  400  mg  Oral  Daily    4. Pregabalin:  150  mg  Oral  3 Times a Day    5. Ondansetron Injectable:  4  mg  IntraVenous Push  Every 6 Hours   PRN       6. hydrOXYzine Hydrochloride (ATARAX):  25  mg  Oral  Every 6 Hours   PRN       7. LORazepam:  1  mg  Oral  Every 8 Hours   PRN       8. Baclofen:  20  mg  Oral  4 Times a Day    9. Cyclobenzaprine:   10  mg  Oral  3 Times a Day   PRN         GASTROINTESTINAL AGENTS:    1. Bisacodyl Rectal:  10  mg  Rectal  Daily   PRN       2. Docusate:  100  mg  Oral  2 Times a Day    3. Fleet Adult (Sodium Phosphate) Rectal:  1  enema  Rectal  Daily   PRN       4. Polyethylene Glycol:  17  gram(s)  Oral  Daily   PRN       5. Sennosides:  2  tablet(s)  Oral  Daily    6. Pantoprazole:  40  mg  Oral  Daily      HORMONES/HORMONE MODIFIERS:    1. Levothyroxine:  100  microgram(s)  Oral  Daily      MISCELLANEOUS AGENTS:    1. Pyridostigmine:  60  mg  Oral  3 Times a Day      NUTRITIONAL PRODUCTS:    1. Lactated Ringers Infusion:  1000  mL  IntraVenous  <Continuous>    2. Multivitamin with Minerals:  1  tablet(s)  Oral  Daily    3. Ascorbic Acid:  500  mg  Oral  2 Times a Day      PSYCHOTHERAPEUTIC AGENTS:    1. traZODone:  150  mg  Oral  At Bedtime    2. Venlafaxine Extended Release:  225  mg  Oral  Daily    3. Lithium Carbonate Extended Release:  300  mg  Oral  Every 12 Hours    4. QUEtiapine:  800  mg  Oral  Daily    5. risperiDONE (RISPERDAL):  0.5  mg  Oral  2 Times a Day      RADIOLOGIC AGENTS:    1. Gadoterate Meglumine (Dotarem-Radiology Contrast):  16.6  mL  IntraVenous Push  Once      RESPIRATORY AGENTS:    1. Albuterol 2.5 mg/ 3 mL Nebulizer Soln:  3  mL  Inhalation  4 Times a Day    2. Formoterol 20 microgram/ 2 mL Neb Soln:  2  mL  Inhalation  Every 12 Hours          Currently Suspended Medications       --------------------------------    1. Heparin SubCutaneous:  5000  unit(s)  SubCutaneous  Every 8 Hours      Recent Lab Results:    Results:    CBC: 3/8/2023 20:11              \     Hgb     /                              \     7.4 L    /  WBC  ----------------  Plt               6.0       ----------------    372              /     Hct     \                              /     24.9 L    \            RBC: 3.46 L    MCV: 72 L          RFP: 3/8/2023 20:11  NA+        Cl-     BUN  /                         140    108 H    7  /  --------------------------------  Glucose                ---------------------------  121 H    K+     HCO3-   Creat \                         4.2    28    0.53  \  Calcium : 8.1 LAnion Gap : 8 L          Albumin : 2.6 L    Phos : 3.5      Coagulation: 3/8/2023 20:11  PT  /                    15.6 H /  -------<    INR          ----------<      1.3 H  PTT\                    48 H \                       Assessment and Plan:   Daily Risk Screen:  ·  Does patient have an indwelling urinary catheter? n/a consulting service   ·  Does patient have a central line? n/a consulting service     Comorbidities:  ·  Comorbidity Other     Code Status:  ·  Code Status Full Code     Assessment:    MANUEL GARCIA is a 52 year old Male with PMH of lumbar fusion and cervical myelopathy and stenosis s/p multiple spinal surgeries: C5-7 ACDF, C3-5 laminoplasty ([facility]  2020) with persistent cervical stenosis s/p removal of laminoplasty hardware, C3-6 PCDF (9/9/22 per NSGY, Dr. Romeo) c/b cervical osteomyelitis s/p C4 corpectomy, C2-T2 PCDF (11/2/22 per NSGY, Dr. Romeo). Presented to [facility] ED on 3/6/23 for evaluation  of cervical spine wound dehiscence/drainage and RLE discomfort and swelling following a mechanical fall at home. First noted cervical spinal wound dehiscence and clear serous drainage in mid January 2023 which progressed to odorous, brown drainage 5 days  prior to presentation with intermittent fevers and malaise. Workup upon presentation notable for elevations in ESR and CRP to 101 and 8.54 respectively. CT cervical spine suboptimal but does reveal extension of the C7 spinous process beyond the level  of the surrounding skin with soft tissue thickening, but without discernable fluid collection. Workup with XR imaging and duplex US additionally obtained for patient's RLE swelling and pain which was essentially unremarkable for acute etiology. Patient  initiated on empiric antibiotics (IV Vanc/Zosyn) and  admitted to the NSGY service pending remaining workup and consults. Plastic Surgery service consulted per NSGY for evaluation of patient's cervical spinal wound with consideration for assistance with  soft tissue coverage and definitive wound closure.       Plan  - Plastics available for intraop assistance with spinal wound closure (likely via paraspinal vs. rotational     muscle flap) during anticipated debridement per NSGY as add on Friday 3/10  - Appreciate perioperative medicine clearance for OR      ·Per periop recs: RCRI risk score is 1/5, given the surgical category is intermediate risk, the patient is at acceptable risk to proceed with surgery.   - Continue interim daily local wound care (nursing orders placed)      · Cleanse wound with saline then cover with Mepilex border dressing (change daily and PRN if soiled)  - Continue nutrition optimization with Vit C and daily MV      · Consider addition of Ensure nutritional shakes TID   - ABX per ID recs/primary   - Appreciate remaining supportive care per primary service   - Plastic Surgery service will continue to follow pending surgical intervention/involvement     BEREKET Combs  Plastic and Reconstructive Surgery  Doc Halo, Pager #[phone], Team phones: [phone]    Time spent on the assessment of patient, gathering and interpreting data, review of medical record/patient history, personally reviewing radiographic imaging and formulation of this note 30 minutes. With greater than 50% spent in personal discussion with  patient.          Electronic Signatures:  Alfredo Allen (JV-CNP)  (Signed 09-Mar-2023 13:26)   Authored: Service, Subjective Data, Objective Data, Assessment  and Plan, Note Completion      Last Updated: 09-Mar-2023 13:26 by Alfredo Allen (APRN-CNP)

## 2023-09-14 NOTE — PROGRESS NOTES
Service: Plastic Surgery     Subjective Data:   MANUEL GARCIA is a 52 year old Male who is Hospital Day # 7 and POD #2 for 1. Paraspinous muscle flap;2. Complex closure;3. ;4. ;5.     Patient notes burning pain that spans across his upper back and radiates down his right arm that stops at his elbow. No ROM issues. States lidocaine patch is not helping. Denies fever, chest pain, SOB,  abd pain, n/v/d.    Objective Data:     Objective Information:      T   P  R  BP   MAP  SpO2   Value  36.3  80  18  132/77      98%  Date/Time 3/12 8:48 3/12 8:48 3/12 8:48 3/12 8:48    3/12 8:48  Range  (36C - 37.3C )  (72 - 100 )  (18 - 20 )  (99 - 140 )/ (50 - 77 )    (94% - 100% )  Highest temp of 37.3 C was recorded at 3/12 0:08      Pain reported at 3/12 9:01: 7 = Severe    Physical Exam by System:    Constitutional: NAD   Eyes: EOMI. Sclera clear.   ENMT: MMM, no lesion/ulcer.   Head/Neck: NC/AT.   Respiratory/Thorax: Unlabored respiration on RA.   Genitourinary: Voiding independently.   Musculoskeletal: Chronic decreased cervical spine  ROM. Cervical incision c/d/i.  x2 THUY drains exiting from both sides of cervical spine with scant bloody serosanguineous output.   Extremities: Lower extremity edema (R>L).   Neurological: A&O x3, without gross neuro deficits.   Psychological: Appropriate mood/behavior.   Skin: Warm, dry and intact. See musculoskeletal exam.     Recent Lab Results:    Results:    CBC: 3/12/2023 09:06              \     Hgb     /                              \     7.8 L    /  WBC  ----------------  Plt               7.3       ----------------    398              /     Hct     \                              /     26.3 L    \            RBC: 3.67 L    MCV: 72 L          RFP: 3/12/2023 09:06  NA+        Cl-     BUN  /                         139    108 H   15  /  --------------------------------  Glucose                ---------------------------  81    K+     HCO3-   Creat \                         4.7     26    0.57  \  Calcium : 8.2 LAnion Gap : 10          Albumin : 2.6 L     Phos : 3.8      Assessment and Plan:   Comorbidities:  ·  Comorbidity Other     Code Status:  ·  Code Status Full Code     Assessment:    MANUEL GARCIA is a 52 year old Male with PMH of lumbar fusion and cervical myelopathy and stenosis s/p multiple spinal surgeries: C5-7 ACDF, C3-5 laminoplasty ([facility]  2020) with persistent cervical stenosis s/p removal of laminoplasty hardware, C3-6 PCDF (9/9/22 per NSGY, Dr. Romeo) c/b cervical osteomyelitis s/p C4 corpectomy, C2-T2 PCDF (11/2/22 per NSGY, Dr. Romeo). Presented to [facility] ED on 3/6/23 for evaluation  of cervical spine wound dehiscence/drainage and RLE discomfort and swelling following a mechanical fall at home. Plastic Surgery service consulted per NSGY for evaluation of patient's cervical spinal wound with consideration for assistance with soft tissue  coverage and definitive wound closure. Ultimately taken to the operating room on 3/10 for wound exploration, washout, removal of C7 and T1 spinous process per neurosurgery as well as paraspinal muscle flap and complex closure per plastic surgery.     Plan/Recommendations:   S/p paraspinal muscle flap, complex closure   - Cervical incision JEREMIAH  - Continue THUY drain care per nursing (x2 to cervical spine)        · Empty and record output at least every 8 hours       · Strip drains TID and PRN to avoid drain obstruction        · Removal per plastic surgery team when output <30cc output/24hrs for x2 consecutive days        · Patient okay to be discharged home with THUY drains in place   - Operative cultures 3/10: proteus mirabilis    - ID following, appreciate recs   - Continue pressure offload from cervical region   - Recommend nutrition optimization to promote wound healing if not contraindicated      ·  Zinc sulfate 220mg PO daily     ·  Vitamin C 500 mg PO daily     ·  Multivitamin daily     ·  Protein supplements (i.e: Ensure)  TID  - Appreciate remaining supportive care per primary service  - Okay for discharge from plastic surgery perspective once medically stable and ID recommendations finalized    - Follow up appointment to be requested closer to anticipated date of discharge   - Plastic surgery will continue to follow in post operative period     Patient and plan discussed with Dr. Hillman, PA-C  Plastic and Reconstructive Surgery  Doc Halo, Pager [phone], Team phones: [phone]    Time spent on the assessment of patient, gathering and interpreting data, review of medical record/patient history, personally reviewing radiographic imaging and formulation of this note 30 minutes. With greater than 50% spent in personal discussion with  patient.          Electronic Signatures:  Zakiya Rowland (PAC)  (Signed 12-Mar-2023 10:42)   Authored: Service, Subjective Data, Objective Data, Assessment  and Plan, Note Completion      Last Updated: 12-Mar-2023 10:42 by Zakiya Rowland (PAC)

## 2023-09-14 NOTE — PROGRESS NOTES
Service: Infectious Disease     Subjective Data:   MANUEL GARCIA is a 52 year old Male who is Hospital Day # 6 and POD #1 for 1. Paraspinous muscle flap;2. Complex closure;3. ;4. ;5.     Patient seen during afternoon rounds  RN present  Patient seated with legs over side of bed  No acute distress  Had surgery 3/10/23  Today has no headache, fever, chills, confusion, shortness of breath or chest pain  Still has diminished sensation in the cervical area.    Objective Data:     Objective Information:      T   P  R  BP   MAP  SpO2   Value  37.1  83  18  116/66      96%  Date/Time 3/11 15:49 3/11 15:49 3/11 15:49 3/11 15:49    3/11 15:49  Range  (36.2C - 37.1C )  (66 - 93 )  (18 - 18 )  (99 - 121 )/ (50 - 76 )    (94% - 100% )  Highest temp of 37.1 C was recorded at 3/11 15:49      Pain reported at 3/11 14:16: 9 = Severe    Physical Exam Narrative:  ·  Physical Exam:    Upright with legs over side of bed  Alert and oriented  Pleasant and interactive  Chronically ill and slightly malnourished appearing  Anterior chest clear  S1, S2, I did not hear a murmur  Chronic right lower extremity flexion contracture with distal peripheral edema and mild reddish discoloration of the lower leg    Neck:       Midline incision well approximated with minimal incisional erythema.       2 THUY drains present at the upper aspect of the incision (right and left)       Sterile dressing reapplied by RN             Medication:    Medications:      1. Vancomycin - RPh to Dose - IV Piggy Back:  1  each  As Specified  Variable      ANTI-INFECTIVES:    1. Cefepime IV Piggy Back:  2  gram(s)  IntraVenous Piggyback  Every 8 Hours    2. Vancomycin IV Piggy Back:  1250  mg  IntraVenous Piggyback  Every 12 Hours      CENTRAL NERVOUS SYSTEM AGENTS:    1. Acetaminophen:  650  mg  Oral  Every 6 Hours   PRN       2. HYDROmorphone Injectable:  0.2  mg  IntraVenous Push  Every 3 Hours   PRN       3. oxyCODONE Immediate Release:  10  mg  Oral  Every 4  Hours   PRN       4. oxyCODONE Immediate Release:  5  mg  Oral  Every 4 Hours   PRN       5. Pregabalin:  150  mg  Oral  3 Times a Day    6. Dronabinol:  5  mg  Oral  2 Times a Day    7. Ondansetron Injectable:  4  mg  IntraVenous Push  Every 6 Hours   PRN       8. hydrOXYzine Hydrochloride (ATARAX):  25  mg  Oral  Every 6 Hours   PRN       9. LORazepam:  1  mg  Oral  Every 8 Hours   PRN       10. Baclofen:  20  mg  Oral  4 Times a Day    11. Cyclobenzaprine:  10  mg  Oral  3 Times a Day   PRN         COAGULATION MODIFIERS:    1. Heparin SubCutaneous:  5000  unit(s)  SubCutaneous  Every 8 Hours      GASTROINTESTINAL AGENTS:    1. Polyethylene Glycol:  17  gram(s)  Oral  Daily    2. Pantoprazole:  40  mg  Oral  Daily      HORMONES/HORMONE MODIFIERS:    1. Levothyroxine:  100  microgram(s)  Oral  Daily      MISCELLANEOUS AGENTS:    1. Pyridostigmine:  60  mg  Oral  3 Times a Day      NUTRITIONAL PRODUCTS:    1. Ascorbic Acid:  500  mg  Oral  2 Times a Day      PSYCHOTHERAPEUTIC AGENTS:    1. traZODone:  150  mg  Oral  At Bedtime    2. Lithium Carbonate Extended Release:  300  mg  Oral  Every 12 Hours    3. QUEtiapine:  800  mg  Oral  Daily    4. risperiDONE (RISPERDAL):  1  mg  Oral  2 Times a Day   PRN         RESPIRATORY AGENTS:    1. Albuterol 2.5 mg/ 3 mL Nebulizer Soln:  3  mL  Inhalation  4 Times a Day    2. Formoterol 20 microgram/ 2 mL Neb Soln:  2  mL  Inhalation  Every 12 Hours      Recent Lab Results:    Results:    CBC: 3/11/2023 06:43              \     Hgb     /                              \     7.2 L    /  WBC  ----------------  Plt               9.5       ----------------    376              /     Hct     \                              /     24.2 L    \            RBC: 3.40 L    MCV: 71 L          RFP: 3/11/2023 06:43  NA+        Cl-     BUN  /                         142    111 H   13  /  --------------------------------  Glucose                ---------------------------  76    K+     HCO3-    Creat \                         4.3    27    0.56  \  Calcium : 7.8 LAnion Gap : 8 L          Albumin : 2.2 L    Phos : 3.2        I have reviewed these laboratory results:    Culture, Miscellaneous, includes Gram Stain  Trending View      Result 10-Mar-2023 22:13:00  10-Mar-2023 15:00:00    Gram Stain NO GRANULOCYTES OR ORGANISMS SEEN.   NO GRANULOCYTES OR ORGANISMS SEEN.    Organism Proteus mirabilis     1+  ANTIBIOTIC SUSCEPTIBILITY  IN PROGRESS.   A   Proteus mirabilis     2+  ANTIBIOTIC SUSCEPTIBILITY  IN PROGRESS.   A    Culture, Miscellaneous, includes Gram Stain EARLY GROWTH, CULTURE IN PROGRESS.   A            Assessment and Plan:   Code Status:  ·  Code Status Full Code     Assessment:    Mr Means is a 52 year old gentleman with PMH of asthma and multiple cervical spine surgeries presented for an open cervical wound and drainage. Patient is now OFF  antibiotics. Wound site is clear, minimal drainage. Wound site is numb.          Patient has had chronic wound with exposure of spinous process of cervical vertebral body.       No gross purulence       Expect polymicrobial infection and associated osteomyelitis       To OR 3/10/23    3/10/23 Operative note excerpts:       Plastic Surgery            1. Extensive debridement  and 2. Adjacent tissue repair with rotation-advancement paraspinal-trapezius superiorly based muscle flap       Neurosurgery:  Wound exploration, washout, removal of C7 and T1 spinous process.   No purulence noted above or below fascia, intact fascia cranial to C7 spinous process         Preliminary cultures:  Proteus mirabilis (abx pending)      Proteus mirabilis infection        final culture pending.  Antibiotic testing pending.  Continue current antibiotics pending final culture which may reveal other organisms         Cervical wound  Exposed cervical spinous process and probable osteomyelitis    RLE with distal edema and chronic flexion contracture of knee (R. TKA  )  Malnourished  s/p remote Gastric bypass and frequent wide swings in weight       (needs to resume supplements per his practitioners at [facility] or could consider consultation here at [facility] with bariatric team for interim advice on supplements)       (has seen dietician here and will need to improve nutrition, which will be important for healing of wound and resolution of infecton)      Recommendations    -  Await final culture and antibiotic susceptibility results  -  Continue vancomycin  -  Continue cefepime  -  Appreciate Pharm.D. assistance in dosing for trough levels 10-20 mcg/ml    Discussed with patient  Discussed with team    ID Team B, pager [phone]    JANNET Viramontes MD  ID Staff  Pager [phone]            Electronic Signatures:  José Viramontes)  (Signed 11-Mar-2023 16:52)   Authored: Service, Subjective Data, Objective Data, Assessment  and Plan, Note Completion      Last Updated: 11-Mar-2023 16:52 by José Viramontes)

## 2023-09-14 NOTE — PROGRESS NOTES
Service: Infectious Disease     Subjective Data:   MANUEL GARCIA is a 52 year old Male who is Hospital Day # 10 and POD #4 for 1. Paraspinous muscle flap;2. Complex closure;3. ;4. ;5.    Additional Information:    No acute events overnight.    Objective Data:     Objective Information:      T   P  R  BP   MAP  SpO2   Value  36.6  66  20  109/71      100%  Date/Time 3/15 7:27 3/15 7:27 3/15 7:27 3/15 7:27    3/15 7:27  Range  (36.5C - 37.2C )  (66 - 88 )  (20 - 20 )  (108 - 128 )/ (60 - 74 )    (95% - 100% )  Highest temp of 37.2 C was recorded at 3/14 5:00      Pain reported at 3/15 3:27: 7 = Severe    Physical Exam Narrative:  ·  Physical Exam:    Patient was seen and examined. His surgical wound is clean, numb.    Physical Exam by System:    Constitutional: Well developed, awake/alert/oriented  x3, no distress, alert and cooperative   Head/Neck: Neck wound clean and numb   Respiratory/Thorax: Patent airways, CTAB, normal  breath sounds with good chest expansion, thorax symmetric   Cardiovascular: Regular, rate and rhythm, no murmurs,  2+ equal pulses of the extremities, normal S 1and S 2   Gastrointestinal: Nondistended, soft, non-tender,  no rebound tenderness or guarding, no masses palpable, no organomegaly, +BS, no bruits   Skin: Ulcers on Right Lower extremity     Medication:    Medications:          Continuous Medications       --------------------------------  No continuous medications are active       Scheduled Medications       --------------------------------    1. Acetaminophen:  650  mg  Oral  Every 6 Hours    2. Albuterol 2.5 mg/ 3 mL Nebulizer Soln:  3  mL  Inhalation  4 Times a Day    3. Ascorbic Acid:  500  mg  Oral  2 Times a Day    4. Baclofen:  20  mg  Oral  4 Times a Day    5. Calcium Carbonate Chewable:  500  mg  Oral  Every 12 Hours    6. Cefepime IV Piggy Back:  2  gram(s)  IntraVenous Piggyback  Every 8 Hours    7. Cyanocobalamin:  500  microgram(s)  Oral  Daily    8. Dronabinol:  5   mg  Oral  2 Times a Day    9. Formoterol 20 microgram/ 2 mL Neb Soln:  2  mL  Inhalation  Every 12 Hours    10. Heparin SubCutaneous:  5000  unit(s)  SubCutaneous  Every 8 Hours    11. Levothyroxine:  100  microgram(s)  Oral  Daily    12. Lidocaine 4% TransDermal:  1  patch  TransDermal  Every 24 Hours    13. Lithium Carbonate Extended Release:  300  mg  Oral  Every 12 Hours    14. Multivitamin with Minerals Naresh Chewable:  2  tablet(s)  Oral  Daily    15. Pantoprazole:  40  mg  Oral  Daily    16. Polyethylene Glycol:  17  gram(s)  Oral  Daily    17. Pregabalin:  150  mg  Oral  3 Times a Day    18. Pyridostigmine:  60  mg  Oral  3 Times a Day    19. QUEtiapine:  800  mg  Oral  Daily    20. traZODone:  150  mg  Oral  At Bedtime    21. Vancomycin - h to Dose - IV Piggy Back:  1  each  As Specified  Variable    22. Vancomycin IV Piggy Back:  1500  mg  IntraVenous Piggyback  Every 12 Hours         PRN Medications       --------------------------------    1. Cyclobenzaprine:  10  mg  Oral  3 Times a Day    2. Dextrose 50% in Water Injectable:  25  gram(s)  IntraVenous Push  Every 15 Minutes    3. Dextrose 50% in Water Injectable:  12.5  gram(s)  IntraVenous Push  Every 15 Minutes    4. Glucagon Injectable:  1  mg  IntraMuscular  Every 15 Minutes    5. Heparin Flush 10 unit/ mL PF Injectable PRN:  5  mL  IntraVenous Flush  According to Flush Policy    6. hydrOXYzine Hydrochloride (ATARAX):  25  mg  Oral  Every 6 Hours    7. LORazepam:  1  mg  Oral  Every 8 Hours    8. Ondansetron Injectable:  4  mg  IntraVenous Push  Every 6 Hours    9. oxyCODONE Immediate Release:  2.5  mg  Oral  Every 4 Hours    10. oxyCODONE Immediate Release:  10  mg  Oral  Every 4 Hours    11. oxyCODONE Immediate Release:  5  mg  Oral  Every 4 Hours    12. risperiDONE (RISPERDAL):  1  mg  Oral  2 Times a Day    13. Sennosides:  1  tablet(s)  Oral  2 Times a Day    14. Sodium Chloride 0.9% Injectable Flush PRN:  10  mL  IntraVenous Flush   According to Flush Policy    15. Sodium Chloride 0.9% Injectable Flush PRN:  20  mL  IntraVenous Flush  According to Flush Policy        Recent Lab Results:    Results:        I have reviewed these laboratory results:    Vancomycin Level, Random  14-Mar-2023 14:12:00      Result Value    Vancomycin Level, Random  14.7        Assessment and Plan:   Daily Risk Screen:  ·  Does patient have a central line? yes   ·  Central Line Type PICC   ·  Plan for PICC removal today? no   ·  The patient continues to require a PICC for parenteral medication     Code Status:  ·  Code Status Full Code     Assessment:      Mr Means is a 52 year old gentleman with PMH of asthma and multiple cervical spine surgeries presented for an open cervical wound and drainage. Patient is now OFF antibiotics. Wound site is clear, minimal drainage. Wound site is numb.          Patient has had chronic wound with exposure of spinous process of cervical vertebral body.       No gross purulence       Expect polymicrobial infection and associated osteomyelitis       To OR 3/10/23             3/10/23 Operative note excerpts:       Plastic Surgery            1. Extensive debridement  and 2. Adjacent tissue repair with rotation-advancement paraspinal-trapezius superiorly based muscle flap       Neurosurgery:  Wound exploration, washout, removal of C7 and T1 spinous process.  No purulence noted above or below fascia, intact fascia cranial to C7 spinous process    Proteus mirabilis/ Staph aureus infection and Corynebacerium striatum (may have other organism in light mixture)       final culture pending.  Final antibiotic testing pending.  Continue current antibiotics pending final culture which may reveal other organisms                 3/12/2023 update:            Spoke to the microbiology lab there may be a mixed infection which may be difficult to separate from swarming Proteus.  We will attempt subculture today 3/12/2023 so final cultures will not be  available until 3/14/2023.  We will still need PICC  line.            Patient still interested in following up with Dr. Singh (infectious diseases in [place]) with whom he has worked before.  We will try to facilitate and will contact Dr. Singh with ID plan and to confirm follow-up.  Anticipating 6 to 8 weeks  of antibiotics perhaps longer depending on clinical outcome and wound healing.  Wound healing may be delayed secondary to patient's chronic malnutrition.         3/13/2023 update:      Culture growing Staph aureus and Corynebacterium          3/15/2023 Update:               R E S U L T S  GRAM STAIN                                   FINAL     03/10/23 22:31     NO GRANULOCYTES OR ORGANISMS SEEN.  MISCELLANEOUS CULT./SM.BACT.                 FINAL     03/14/23 10:45                   ISOLATE1 : Proteus mirabilis     2+                ISOLATE2 : Staphylococcus aureus     1+                ISOLATE3 : Corynebacterium striatum     1+     ______________________________________________________________________________  Organism                   P mirabilis      S aureus              C striatum         Antibiotic                 BP      INTRP    BP      INTRP         STAN     INTRP      ______________________________________________________________________________  Ampicillin                          S                                            Cefazolin                           S                                            Ciprofloxacin                      S                                            Gentamicin                        S                                            Levofloxacin                      S                                            Piperc/Tazobact                 S                                            Trimeth/Sulfa                     S                S                           Clindamycin                                          S                           Erythromycin                                         S                           Oxacillin                                                S                           Tetracycline                                          S                            Vancomycin                                          S                  1.0     S          Penicillin                                                                   16      R          Ceftriaxone                                                               >256    R          ______________________________________________________________________________      Cervical wound  Exposed cervical C7 spinous process and probable osteomyelitis    RLE with distal edema and chronic flexion contracture of knee (R. TKA )  Malnourished  s/p remote Gastric bypass and frequent wide swings in weight       (needs to resume supplements per his practitioners at [facility] or could consider consultation here at [facility] with bariatric team for interim advice on supplements)       (has seen dietician here and will need to improve nutrition, which will be important for healing of wound and resolution of infecton)       THUS, it is imperative that patient have good dietary plan for maximizing nutrition (in setting of prior gastric bypass surgery and history of gaps in supplemental nutrition)        Recommendations  -  Continue vancomycin (1.5q12hr)    -  Stop Cefepime    -  ceftriaxone 2q24hr    -  ID contacted the lab to test corynebacterium for susceptibility to tetracycline and linezolid (Dr. Viramontes will follow up) for possible oral agent later    -  Patient would like to follow up with Dr. Singh in [place], [place] who is familiar with his case and is nearer patient's home.  ID team will contact Dr. Singh's office and send notes for transition of care planning    -  Anticipate 8 weeks of abx provided sufficient healing, and stable graft/wound closure.    -  After discharge will need weekly CBC plus differential, vancomycin trough,  BMP and C-reactive protein.  Should be faxed to both Dr. Singh in [place] and Dr. Viramontes at [place] at [phone].      For further questions, please call us (ID team B) on pager number [phone].    Saadia Willams MS4    and   JANNET Viramontes MD  ID Staff  Pager [phone]      Attestation:   Note Completion:  I am a:  Medical Student/Acting Intern   Medical Student Attestation I, or a resident under my supervision, was present with the medical student who participated in the documentation of this note.  I have personally seen and examined the patient and performed the medical decision-making components. I have reviewed the medical student documentation and/or resident documentation and verified the findings in the note as written with additions or exceptions  as stated in the body of this note.    I personally evaluated the patient on 15-Mar-2023         Electronic Signatures:  José Viramontes)  (Signed 15-Mar-2023 18:52)   Authored: Assessment and Plan, Note Completion  Saadia Willams (MED STUD)  (Signed 15-Mar-2023 13:41)   Authored: Service, Subjective Data, Objective Data, Assessment  and Plan, Note Completion      Last Updated: 15-Mar-2023 18:52 by José Virmaontes)

## 2023-09-14 NOTE — PROGRESS NOTES
Service: Neurosurgery     Subjective Data:   MANUEL GARCIA is a 52 year old Male who is Hospital Day # 12 and POD #6 for 1. Paraspinous muscle flap;2. Complex closure;3. ;4. ;5.    Objective Data:     Objective Information:      T   P  R  BP   MAP  SpO2   Value  36.4  78  20  116/54      96%  Date/Time 3/16 15:30 3/16 15:30 3/16 15:30 3/16 15:30    3/16 15:30  Range  (36.2C - 36.4C )  (67 - 78 )  (20 - 20 )  (110 - 120 )/ (54 - 71 )    (96% - 100% )      Pain reported at 3/16 21:10: 9 = Severe    Physical Exam by System:    Neurological: RUE D4 B/T4+ HG/IO 4  LUE D4+ B/T 4+ HG/IO 4+  BLE 4+     Recent Lab Results:    Results:    CBC: 3/16/2023 05:08              \     Hgb     /                              \     7.7 L    /  WBC  ----------------  Plt               6.1       ----------------    393              /     Hct     \                              /     26.1 L    \            RBC: 3.58 L    MCV: 73 L          RFP: 3/16/2023 05:08  NA+        Cl-     BUN  /                         141    108 H   10  /  --------------------------------  Glucose                ---------------------------  93    K+     HCO3-   Creat \                         4.0    28    0.51  \  Calcium : 8.3 LAnion Gap : 9 L          Albumin : 2.6 L    Phos : 3.1      Assessment and Plan:   Daily Risk Screen:  ·  Does patient have a central line? yes   ·  Central Line Type PICC   ·  Plan for PICC removal today? no   ·  The patient continues to require a PICC for parenteral medication     Comorbidities:  ·  Comorbidity Other     Code Status:  ·  Code Status Full Code     Assessment:    52 M h/o asthma, hypothyroidism, MSSA bactremia, PEG tube, cervical myelopathy s/p C5-7 ACDF, C3-5 laminoplasty ([facility] 2020), s/p thoracic SCS, s/p lumbar surgery,  fibromyalgia, persistent cervical stenosis 9/9/22 s/p removal of laminoplasty hardware, C3-6 PCDF, c/b cervical osteomyelitis 11/2/22 s/p C4 corpectomy, C2-T2, p/w exposed spinous  process, fall onto R knee, BLE DVT US neg, CT CS harware in posn, R T2  screw lucency with good fusion mass    3/10 s/p cervical wound revision w/ plastics  3/13 s/p picc  3/15 drains dc'd    Plan:  dispo today, was planned to go 3/16 but medical transport had to delay    Please page Neurosurgery pager [[phone]] with any questions or concerns during the day.  Progress note authored by On Call resident. [facility] messages will have delayed responses.     Attestation:   Note Completion:  I am a:  Resident/Fellow   Attending Attestation I reviewed the resident/fellow?s documentation and discussed the patient with the resident/fellow.  I agree with the resident/fellow?s medical  decision making as documented in the note.          Electronic Signatures:  Lorenzo Romeo (MD)  (Signed 17-Mar-2023 07:52)   Authored: Note Completion   Co-Signer: Service, Subjective Data, Objective Data, Assessment and Plan, Note Completion  Jefe Laura (Resident))  (Signed 17-Mar-2023 02:02)   Authored: Service, Subjective Data, Objective Data, Assessment  and Plan, Note Completion      Last Updated: 17-Mar-2023 07:52 by Lorenzo Romeo)

## 2023-09-14 NOTE — PROGRESS NOTES
Service: Neurosurgery     Subjective Data:   MANUEL GARCIA is a 52 year old Male who is Hospital Day # 10 and POD #4 for 1. Paraspinous muscle flap;2. Complex closure;3. ;4. ;5.    Objective Data:     Objective Information:      T   P  R  BP   MAP  SpO2   Value  36.6  66  20  109/71      100%  Date/Time 3/15 7:27 3/15 7:27 3/15 7:27 3/15 7:27    3/15 7:27  Range  (36.5C - 37.2C )  (66 - 88 )  (20 - 20 )  (108 - 128 )/ (60 - 74 )    (95% - 100% )  Highest temp of 37.2 C was recorded at 3/14 5:00      Pain reported at 3/15 3:27: 7 = Severe    Physical Exam by System:    Neurological: RUE D4 B/T4+ HG/IO 4  LUE D4+ B/T 4+ HG/IO 4+  BLE 4+     Recent Lab Results:    Results:    CBC: 3/13/2023 09:41              \     Hgb     /                              \     7.2 L    /  WBC  ----------------  Plt               6.8       ----------------    371              /     Hct     \                              /     23.7 L    \            RBC: 3.29 L    MCV: 72 L          RFP: 3/13/2023 09:41  NA+        Cl-     BUN  /                         140    108 H   14  /  --------------------------------  Glucose                ---------------------------  56 L    K+     HCO3-   Creat \                         4.3    27    0.55  \  Calcium : 8.1 LAnion Gap : 9 L          Albumin : 2.4 L    Phos : 3.4      Assessment and Plan:   Daily Risk Screen:  ·  Does patient have a central line? yes   ·  Central Line Type PICC   ·  Plan for PICC removal today? no   ·  The patient continues to require a PICC for parenteral medication     Comorbidities:  ·  Comorbidity Other     Code Status:  ·  Code Status Full Code     Assessment:    52 M h/o asthma, hypothyroidism, MSSA bactremia, PEG tube, cervical myelopathy s/p C5-7 ACDF, C3-5 laminoplasty ([facility] 2020), s/p thoracic SCS, s/p lumbar surgery,  fibromyalgia, persistent cervical stenosis 9/9/22 s/p removal of laminoplasty hardware, C3-6 PCDF, c/b cervical osteomyelitis 11/2/22 s/p  C4 corpectomy, C2-T2, p/w exposed spinous process, fall onto R knee, BLE DVT US neg, CT CS harware in posn, R T2  screw lucency with good fusion mass    3/10 s/p cervical wound revision w/ plastics  3/13 s/p picc    Plan:  floor  knee xray this AM for persistent inability to bear weight on right knee  drains per plastics  ID recs - vanc/cefe until cx final  OR Cx  plastics recs  PTOT - OP  nutrition recs  SQH, SCDs    Attestation:   Note Completion:  I am a:  Resident/Fellow   Attending Attestation I reviewed the resident/fellow?s documentation and discussed the patient with the resident/fellow.  I agree with the resident/fellow?s medical  decision making as documented in the note.          Electronic Signatures:  Lorenzo Romeo (MD)  (Signed 16-Mar-2023 08:11)   Authored: Note Completion   Co-Signer: Service, Subjective Data, Objective Data, Assessment and Plan, Note Completion  Jefe Laura (Resident))  (Signed 15-Mar-2023 08:50)   Authored: Service, Subjective Data, Objective Data, Assessment  and Plan, Note Completion      Last Updated: 16-Mar-2023 08:11 by Lorenzo Romeo (MD)

## 2023-09-14 NOTE — PROGRESS NOTES
Service: Infectious Disease     Subjective Data:   MANUEL GARCIA is a 52 year old Male who is Hospital Day # 11 and POD #5 for 1. Paraspinous muscle flap;2. Complex closure;3. ;4. ;5.     Patient seen during early afternoon rounds  Patient up to the restroom in no acute distress    No fever, chills, headache, this of breath or chest pain    THUY drains removed by plastic surgery on 3/15/2022    Overall feels well    Discussed with Care Coordinator and abx through [facility] and home care from [facility].    Objective Data:     Objective Information:      T   P  R  BP   MAP  SpO2   Value  36.4  78  20  116/54      96%  Date/Time 3/16 15:30 3/16 15:30 3/16 15:30 3/16 15:30    3/16 15:30  Range  (36.2C - 36.8C )  (66 - 89 )  (18 - 20 )  (109 - 129 )/ (54 - 78 )    (96% - 100% )      Pain reported at 3/16 14:50: 7 = Severe    Physical Exam Narrative:  ·  Physical Exam:    Sitting upright on the side of the bed and feet on the floor  Pleasant interactive  Normal conversation  Lungs clear, S1, S2, I did not hear a murmur  Soft nontender abdomen  Chronic right leg with flexion contracture at the knee and distal erythema and tenderness without signs of infection (chronic)  Also with right pretibial small ulcer without signs of localized infection    Neck:     THUY drains have been removed     Mepilex dressing with some leakage and strikethrough, not blood      Small skin defects at THUY drain sites.  No exudate.  I could not express any exudate or serous fluid.  No gross swelling but no obvious tenderness this is but numb at baseline)       Midline incision is dry, and without redness, swelling or drainage    Baseline mood and affect    Recent Lab Results:    Results:    CBC: 3/16/2023 05:08              \     Hgb     /                              \     7.7 L    /  WBC  ----------------  Plt               6.1       ----------------    393              /     Hct     \                              /     26.1 L    \             RBC: 3.58 L    MCV: 73 L          RFP: 3/16/2023 05:08  NA+        Cl-     BUN  /                         141    108 H   10  /  --------------------------------  Glucose                ---------------------------  93    K+     HCO3-   Creat \                         4.0    28    0.51  \  Calcium : 8.3 LAnion Gap : 9 L          Albumin : 2.6 L    Phos : 3.1      Assessment and Plan:   Daily Risk Screen:  ·  Does patient have a central line? yes   ·  Central Line Type PICC   ·  Plan for PICC removal today? no   ·  The patient continues to require a PICC for parenteral medication     Code Status:  ·  Code Status Full Code     Assessment:      Mr Means is a 52 year old gentleman with PMH of asthma and multiple cervical spine surgeries presented for an open cervical wound and drainage. Patient is now OFF antibiotics. Wound site is clear, minimal drainage. Wound site is numb.          Patient has had chronic wound with exposure of spinous process of cervical vertebral body.       No gross purulence       Expect polymicrobial infection and associated osteomyelitis       To OR 3/10/23             3/10/23 Operative note excerpts:       Plastic Surgery            1. Extensive debridement  and 2. Adjacent tissue repair with rotation-advancement paraspinal-trapezius superiorly based muscle flap       Neurosurgery:  Wound exploration, washout, removal of C7 and T1 spinous process.  No purulence noted above or below fascia, intact fascia cranial to C7 spinous process    Proteus mirabilis/ Staph aureus infection and Corynebacerium striatum (may have other organism in light mixture)       final culture pending.  Final antibiotic testing pending.  Continue current antibiotics pending final culture which may reveal other organisms                 3/12/2023 update:            Spoke to the microbiology lab there may be a mixed infection which may be difficult to separate from swarming Proteus.  We will attempt subculture  today 3/12/2023 so final cultures will not be available until 3/14/2023.  We will still need PICC  line.            Patient still interested in following up with Dr. Singh (infectious diseases in [place]) with whom he has worked before.  We will try to facilitate and will contact Dr. Singh with ID plan and to confirm follow-up.  Anticipating 6 to 8 weeks  of antibiotics perhaps longer depending on clinical outcome and wound healing.  Wound healing may be delayed secondary to patient's chronic malnutrition.         3/13/2023 update:      Culture growing Staph aureus and Corynebacterium          3/15/2023 Update:               R E S U L T S  GRAM STAIN                                   FINAL     03/10/23 22:31     NO GRANULOCYTES OR ORGANISMS SEEN.  MISCELLANEOUS CULT./SM.BACT.                 FINAL     03/14/23 10:45                   ISOLATE1 : Proteus mirabilis     2+                ISOLATE2 : Staphylococcus aureus     1+                ISOLATE3 : Corynebacterium striatum     1+     ______________________________________________________________________________  Organism                   P mirabilis      S aureus              C striatum         Antibiotic                 BP      INTRP    BP      INTRP         STAN     INTRP      ______________________________________________________________________________  Ampicillin                          S                                            Cefazolin                           S                                            Ciprofloxacin                      S                                            Gentamicin                        S                                            Levofloxacin                      S                                            Piperc/Tazobact                 S                                            Trimeth/Sulfa                     S                S                           Clindamycin                                          S                            Erythromycin                                        S                           Oxacillin                                                S                           Tetracycline                                          S                            Vancomycin                                          S                  1.0     S          Penicillin                                                                   16      R          Ceftriaxone                                                               >256    R          ______________________________________________________________________________  AWAITING ADDITIONAL TEST OF CORYNEBACTERIUM SUSCEPTIBILITY TO LINEZOLID AND DOXYCYCLINE           03/16/23 Update:        Disposition plan for today.  I discussed with Dr. Singh, ID specialist in [facility] and he is familiar with patient for several years.  I updated Dr. Singh with proposed plan.  Dr. Singh kindly agreeable to follow patient long-term since patient  lives nearby and he is familiar with the case.  We will tentatively complete 8-week course of vancomycin and ceftriaxone   to cover any bacterium and Proteus mirabilis and Staph aureus, respectively.  Then consider oral agents for long-term suppression       [facility], home pharmacy services, aware that patient's labs may also be sent to Dr. Singh.  Dr. Viramontes will also receive laboratory studies for the first 1 to 2 weeks to aid in this transition.       She will contact Dr. Singh's office to set up appointment in 3 to 4 weeks    Cervical wound  Exposed cervical C7 spinous process and probable osteomyelitis    RLE with distal edema and chronic flexion contracture of knee (R. TKA )  Malnourished  s/p remote Gastric bypass and frequent wide swings in weight       (needs to resume supplements per his practitioners at [facility] or could consider consultation here at [facility] with bariatric team for interim advice on supplements)       (has seen  dietician here and will need to improve nutrition, which will be important for healing of wound and resolution of infecton)       THUS, it is imperative that patient have good dietary plan for maximizing nutrition (in setting of prior gastric bypass surgery and history of gaps in supplemental nutrition)        Recommendations  -  Continue vancomycin (1.5q12hr). 3/14/23 Vancomycin level was 14.7    -  3/15/23 discontinued Cefepime    -  3/15/23 started ceftriaxone 2q24hr    -  ID contacted the lab to test corynebacterium for susceptibility to tetracycline and linezolid (Dr. Viramontes will follow up) for possible oral agent later -additional antibiotic test results pending 3/16/2023 (Dr. Viramontes will follow-up)    -  Patient will contact Dr. Singh's office in [facility] ([phone]) to schedule an ID clinic appointment    -  During transition Dr. Viramontes will also follow patient's labs and discussed with Dr. Singh as needed.  It is my understanding from the care coordinator that labs will also be sent to Dr. Singh.  Would like to maintain normal laboratory studies of course  but will monitor vancomycin level and titrate dose to 10 to 20 mcg/mL.      -  Anticipate 8 weeks of abx provided sufficient healing, and stable graft/wound closure.         *Tentative 8-week antibiotic stop date would be May 5, 2023*    -  After discharge will need weekly CBC plus differential, vancomycin trough, BMP and C-reactive protein.  Should be faxed to both Dr. Singh in [place] ([phone]) and Dr. Viramontes at [place] at [phone].    -  Also, patient gave me verbal permission / consent to copy operative notes, progress notes and results and fax to Dr. Singh at [phone]    Discussed with patient and caregiver  Discussed with Dr. Samantha Viramontes available during transition or there after  Dr. Viramontes available by pager [phone], office [phone] or by doc halo            Electronic Signatures:  José Viramontes)  (Signed  16-Mar-2023 18:25)   Authored: Service, Subjective Data, Objective Data, Assessment  and Plan, Note Completion      Last Updated: 16-Mar-2023 18:25 by José Viramontes)

## 2023-09-14 NOTE — PROGRESS NOTES
Service: Infectious Disease     Subjective Data:   MANUEL GARCIA is a 52 year old Male who is Hospital Day # 8 and POD #2 for 1. Paraspinous muscle flap;2. Complex closure;3. ;4. ;5.    Additional Information:      Patient seen during midday rounds  Patient seated in bed  In no acute distress    Had no overnight fever, chills, rigors, chest pain, or shortness of breath.    Two left drains are in place. Upper medial one is oozing a bit. He has 2 punctate lesions with dry scab on right  upper lip.     Objective Data:     Objective Information:      T   P  R  BP   MAP  SpO2   Value  36.6  86  18  108/54      96%  Date/Time 3/13 15:46 3/13 15:46 3/13 15:46 3/13 15:46    3/13 15:46  Range  (35.5C - 37.9C )  (66 - 100 )  (18 - 20 )  (96 - 140 )/ (54 - 80 )    (93% - 100% )  Highest temp of 37.9 C was recorded at 3/12 15:23      Pain reported at 3/13 8:45: 7 = Severe    Physical Exam by System:    Constitutional: Well developed, awake/alert/oriented  x3, no distress, alert and cooperative   ENMT: mucous membranes moist, no apparent injury,  no lesions seen  two small healing abrasions on right upper lip   Head/Neck: Posterior cervical neck incision is clean  and looks closed and well healing.  2Drains in place on the left side of incision and  medial drain exit site with scant fluid   Respiratory/Thorax: Patent airways, CTAB, normal  breath sounds with good chest expansion, thorax symmetric   Cardiovascular: Regular, rate and rhythm, no murmurs,  2+ equal pulses of the extremities, normal S 1and S 2   Gastrointestinal: Nondistended, soft, non-tender,  no rebound tenderness or guarding, no masses palpable, no organomegaly, +BS, no bruits   Skin: Warm and dry, no lesions, no rashes     Medication:    Medications:          Continuous Medications       --------------------------------  No continuous medications are active       Scheduled Medications       --------------------------------    1. Acetaminophen:  650  mg   Oral  Every 6 Hours    2. Albuterol 2.5 mg/ 3 mL Nebulizer Soln:  3  mL  Inhalation  4 Times a Day    3. Ascorbic Acid:  500  mg  Oral  2 Times a Day    4. Baclofen:  20  mg  Oral  4 Times a Day    5. Cefepime IV Piggy Back:  2  gram(s)  IntraVenous Piggyback  Every 8 Hours    6. Dronabinol:  5  mg  Oral  2 Times a Day    7. Formoterol 20 microgram/ 2 mL Neb Soln:  2  mL  Inhalation  Every 12 Hours    8. Heparin SubCutaneous:  5000  unit(s)  SubCutaneous  Every 8 Hours    9. Levothyroxine:  100  microgram(s)  Oral  Daily    10. Lidocaine 4% TransDermal:  1  patch  TransDermal  Every 24 Hours    11. Lithium Carbonate Extended Release:  300  mg  Oral  Every 12 Hours    12. Pantoprazole:  40  mg  Oral  Daily    13. Polyethylene Glycol:  17  gram(s)  Oral  Daily    14. Pregabalin:  150  mg  Oral  3 Times a Day    15. Pyridostigmine:  60  mg  Oral  3 Times a Day    16. QUEtiapine:  800  mg  Oral  Daily    17. traZODone:  150  mg  Oral  At Bedtime    18. Vancomycin - h to Dose - IV Piggy Back:  1  each  As Specified  Variable    19. Vancomycin IV Piggy Back:  1500  mg  IntraVenous Piggyback  Every 12 Hours         PRN Medications       --------------------------------    1. Cyclobenzaprine:  10  mg  Oral  3 Times a Day    2. hydrOXYzine Hydrochloride (ATARAX):  25  mg  Oral  Every 6 Hours    3. LORazepam:  1  mg  Oral  Every 8 Hours    4. Ondansetron Injectable:  4  mg  IntraVenous Push  Every 6 Hours    5. oxyCODONE Immediate Release:  2.5  mg  Oral  Every 4 Hours    6. oxyCODONE Immediate Release:  10  mg  Oral  Every 4 Hours    7. oxyCODONE Immediate Release:  5  mg  Oral  Every 4 Hours    8. risperiDONE (RISPERDAL):  1  mg  Oral  2 Times a Day    9. Sennosides:  1  tablet(s)  Oral  2 Times a Day        Recent Lab Results:    Results:    CBC: 3/13/2023 09:41              \     Hgb     /                              \     7.2 L    /  WBC  ----------------  Plt               6.8       ----------------    371               /     Hct     \                              /     23.7 L    \            RBC: 3.29 L    MCV: 72 L          RFP: 3/13/2023 09:41  NA+        Cl-     BUN  /                         140    108 H   14  /  --------------------------------  Glucose                ---------------------------  56 L    K+     HCO3-   Creat \                         4.3    27    0.55  \  Calcium : 8.1 LAnion Gap : 9 L          Albumin : 2.4 L    Phos : 3.4      R E S U L T S     GRAM STAIN                                   FINAL     03/10/23 22:31        NO GRANULOCYTES OR ORGANISMS SEEN.     MISCELLANEOUS CULT./SM.BACT.                 PRELIM    03/13/23 11:37                   ISOLATE1 : Proteus mirabilis        2+     ANTIBIOTIC SUSCEPTIBILITY     IN PROGRESS.                   ISOLATE2 : Staphylococcus aureus       1+     ____________________________________________  Organism                   P mirabilis        Antibiotic                 BP      INTRP      ____________________________________________  Ampicillin                         S          Cefazolin                          S          Ciprofloxacin                      S          Gentamicin                         S          Levofloxacin                       S          Piperc/Tazobact                    S          Trimeth/Sulfa                      S          ______________________________________________________________________________  S=SUSCEPTIBLE  I=INTERMEDIATE  R=RESISTANT SDD=SUSCEPTIBLE DOSE DEPENDENT   NS=NONSUSCEPTIBLE  X=REPORTED IN ERROR  ______________________________________________________________________________    I have reviewed these laboratory results: Vancomycin Level, Trough [Drawn 12-Mar-2023 13:28:00].    Radiology Results:    Results:        Impression:    1. Suboptimal exam.  2. Anteriorand posterior cervical spine surgical change. C7 spinous  process extends beyond the skin with surrounding soft tissue  thickening. No discernible  collection.        CT C Spine without Contrast [Mar  7 2023  4:19AM]      Assessment and Plan:   Daily Risk Screen:  ·  Does patient have an indwelling urinary catheter? n/a consulting service    ·  Does patient have a central line? n/a consulting service      Comorbidities:  ·  Comorbidity Other     Code Status:  ·  Code Status Full Code     Assessment:    Assessment:    Mr Means is a 52 year old gentleman with PMH of asthma and multiple cervical spine surgeries presented for an open cervical wound and drainage. Patient is now OFF  antibiotics. Wound site is clear, minimal drainage. Wound site is numb.          Patient has had chronic wound with exposure of spinous process of cervical vertebral body.       No gross purulence       Expect polymicrobial infection and associated osteomyelitis       To OR 3/10/23    3/10/23 Operative note excerpts:       Plastic Surgery            1. Extensive debridement  and 2. Adjacent tissue repair with rotation-advancement paraspinal-trapezius superiorly based muscle flap       Neurosurgery:  Wound exploration, washout, removal of C7 and T1 spinous process.   No purulence noted above or below fascia, intact fascia cranial to C7 spinous process          SOURCE:  BIOPSY                            COLLECTED:  03/10/23 15:00  ANTIBIOTICS AT [facility].:                      RECEIVED :  03/10/23 22:03  SITE:    C7 SPINOUS PROCESS                                     R E S U L T S     GRAM STAIN                                   FINAL     03/10/23 22:31        NO GRANULOCYTES OR ORGANISMS SEEN.     MISCELLANEOUS CULT./SM.BACT.                 PRELIM    03/13/23 16:15                   ISOLATE1 : Proteus mirabilis     2+                     ISOLATE2 : Staphylococcus aureus     1+                   ISOLATE3 : Corynebacterium striatum     1+     ____________________________________________  Organism                   P mirabilis        Antibiotic                 BP      INTRP       ____________________________________________  Ampicillin                         S          Cefazolin                          S          Ciprofloxacin                      S          Gentamicin                         S          Levofloxacin                       S          Piperc/Tazobact                    S          Trimeth/Sulfa                      S       __________________________________________________      Proteus mirabilis/ Staph aureus infection and Corynebacerium striatum (may have other organism in light mixture)       final culture pending.  Final antibiotic testing pending.  Continue current antibiotics pending final culture which may reveal other organisms         Cervical wound  Exposed cervical C7 spinous process and probable osteomyelitis    RLE with distal edema and chronic flexion contracture of knee (R. TKA )  Malnourished  s/p remote Gastric bypass and frequent wide swings in weight       (needs to resume supplements per his practitioners at [facility] or could consider consultation here at [facility] with bariatric team for interim advice on supplements)       (has seen dietician here and will need to improve nutrition, which will be important for healing of wound and resolution of infecton)           3/12/2023 update:            Spoke to the microbiology lab there may be a mixed infection which may be difficult to separate from swarming Proteus.  We will attempt subculture today 3/12/2023 so final cultures will not be available until 3/14/2023.  We will still need PICC  line.            Patient still interested in following up with Dr. Singh (infectious diseases in [place]) with whom he has worked before.  We will try to facilitate and will contact Dr. Singh with ID plan and to confirm follow-up.  Anticipating 6 to 8 weeks  of antibiotics perhaps longer depending on clinical outcome and wound healing.  Wound healing may be delayed secondary to patient's chronic malnutrition.              THUS, it  is imperative that patient have good dietary plan for maximizing nutrition (in setting of prior gastric bypass surgery and history of gaps in supplemental nutrition)    3/13/2023 update:  Culture growing Staph aureus and Corynebacterium, pending susceptibilities to be able to choose antibiotic regimen, dose and duration of treatment.     Recommendations  -  Await final culture and antibiotic susceptibility results  -  Continue vancomycin  -  Continue cefepime 2gr q8hr   -  Appreciate Pharm.D. assistance in dosing vancomycin for trough levels 10-20 mcg/ml    -  Patient would like to follow up with Dr. Singh in [place], [place] who is familiar with his case and is nearer patient's home.  ID team will contact Dr. Singh's office and send notes for transition of care planning      For further questions, please call us (ID team B) on pager number [phone].    Saadia Willams, MS4    and     Reviewed assessment/plan and edited by:     Alvaro Chapin MD  PGY-4 Infectious Disease Fellow  ID Team B  Pager [phone]    and  JANNET Viramontes MD  ID Staff  Pager [phone]        Attestation:   Note Completion:  I am a:  Resident/Fellow   Attending Attestation I saw and evaluated the patient.  I personally obtained the key and critical portions of the history and physical exam or was physically present for key and  critical portions performed by the resident/fellow. I reviewed the resident/fellow?s documentation and discussed the patient with the resident/fellow.  I agree with the resident/fellow?s medical decision making as documented in the note.     I personally evaluated the patient on 13-Mar-2023         Electronic Signatures:  José Viramontes)  (Signed 14-Mar-2023 08:34)   Authored: Subjective Data, Objective Data, Assessment  and Plan, Note Completion   Co-Signer: Service, Subjective Data, Objective Data, Assessment and Plan, Note Completion  Alvaro Sousa (Fellow))  (Signed 13-Mar-2023 18:01)   Authored: Assessment and Plan, Note  Completion  Saadia Willams (MED STUD)  (Signed 13-Mar-2023 16:02)   Authored: Service, Subjective Data, Objective Data, Assessment  and Plan, Note Completion      Last Updated: 14-Mar-2023 08:34 by José Vriamontes)

## 2023-09-14 NOTE — PROGRESS NOTES
Service: Neurosurgery     Subjective Data:   MANUEL GARCIA is a 52 year old Male who is Hospital Day # 6 and POD #1 for 1. Paraspinous muscle flap;2. Complex closure;3. ;4. ;5.    Objective Data:     Objective Information:      T   P  R  BP   MAP  SpO2   Value  37.1  83  18  116/66      96%  Date/Time 3/11 15:49 3/11 15:49 3/11 15:49 3/11 15:49    3/11 15:49  Range  (36.2C - 37.1C )  (66 - 93 )  (18 - 18 )  (99 - 121 )/ (50 - 76 )    (94% - 100% )  Highest temp of 37.1 C was recorded at 3/11 15:49      Pain reported at 3/11 14:16: 9 = Severe    Physical Exam by System:    Neurological: RUE D4 B/T4+ HG/IO 4  LUE D4+ B/T 4+ HG/IO 4+  BLE 4+     Recent Lab Results:    Results:    CBC: 3/11/2023 06:43              \     Hgb     /                              \     7.2 L    /  WBC  ----------------  Plt               9.5       ----------------    376              /     Hct     \                              /     24.2 L    \            RBC: 3.40 L    MCV: 71 L          RFP: 3/11/2023 06:43  NA+        Cl-     BUN  /                         142    111 H   13  /  --------------------------------  Glucose                ---------------------------  76    K+     HCO3-   Creat \                         4.3    27    0.56  \  Calcium : 7.8 LAnion Gap : 8 L          Albumin : 2.2 L    Phos : 3.2      Assessment and Plan:   Comorbidities:  ·  Comorbidity Other     Code Status:  ·  Code Status Full Code     Assessment:    52 M h/o asthma, hypothyroidism, MSSA bactremia, PEG tube, cervical myelopathy s/p C5-7 ACDF, C3-5 laminoplasty ([facility] 2020), s/p thoracic SCS, s/p lumbar surgery,  fibromyalgia, persistent cervical stenosis 9/9/22 s/p removal of laminoplasty hardware, C3-6 PCDF, c/b cervical osteomyelitis 11/2/22 s/p C4 corpectomy, C2-T2, p/w exposed spinous process, fall onto R knee, BLE DVT US neg, CT CS harware in posn, R T2  screw lucency with good fusion mass      3/10 s/p cervical wound revision w/  plastics    Plan:  floor  drains per plastics  ID recs - vanc/cefe until cx final  OR Cx - proteus mirabillis  plastics recs  PTOT - OP  SQH, SCDs    Attestation:   Note Completion:  I am a:  Resident/Fellow   Attending Attestation I reviewed the resident/fellow?s documentation and discussed the patient with the resident/fellow.  I agree with the resident/fellow?s medical  decision making as documented in the note.          Electronic Signatures:  Lorenzo Romeo)  (Signed 12-Mar-2023 16:05)   Authored: Note Completion   Co-Signer: Service, Subjective Data, Objective Data, Assessment and Plan, Note Completion  Claudio Servin (Resident))  (Signed 11-Mar-2023 23:51)   Authored: Service, Subjective Data, Objective Data, Assessment  and Plan, Note Completion      Last Updated: 12-Mar-2023 16:05 by Lorenzo Romeo)

## 2023-09-14 NOTE — PROGRESS NOTES
Service: Plastic Surgery     Subjective Data:   MANUEL GARCIA is a 52 year old Male who is Hospital Day # 3.     Resting in bed on assessment, denies any acute concerns overnight.     Denies any fever, chills, night sweats, CP, SOB, palpitations, nausea, vomiting, or abdominal pain/discomfort.    Objective Data:     Objective Information:      T   P  R  BP   MAP  SpO2   Value  35.3  63  16  123/74      99%  Date/Time 3/8 4:07 3/8 4:07 3/8 4:07 3/8 4:07    3/8 4:07  Range  (35.3C - 36.9C )  (63 - 89 )  (16 - 20 )  (104 - 131 )/ (53 - 79 )    (94% - 100% )  Highest temp of 36.9 C was recorded at 3/7 2:22      Pain reported at 3/7 21:58: 6 = Moderate    ---- Intake and Output  -----  Mn/Dy/Year Time  Intake   Output  Net  Mar 7, 2023 10:00 pm  0   0  0      Physical Exam by System:    Constitutional: Alert/awake, calm and cooperative.  NAD   Eyes: EOMI. Sclera clear   ENMT: MMM, no lesion/ulcer   Head/Neck: NC/AT   Respiratory/Thorax: Unlabored respiration on RA   Genitourinary: Voiding independently   Musculoskeletal: Chronic decreased cervical spine  ROM. Mepilex dressing intact over cervical spinal wound, without drainage or strikethrough.   Extremities: Lower extremity edema (R>L)   Neurological: A&O x3, without gross neuro deficits   Psychological: Appropriate mood/behavior   Skin: Warm, dry and intact. See musculoskeletal exam     Medication:    Medications:      CENTRAL NERVOUS SYSTEM AGENTS:    1. Acetaminophen:  650  mg  Oral  Every 6 Hours   PRN       2. oxyCODONE Immediate Release:  5  mg  Oral  Every 4 Hours   PRN       3. lamoTRIgine (LAMICTAL):  400  mg  Oral  Daily    4. Pregabalin:  150  mg  Oral  3 Times a Day    5. Ondansetron Injectable:  4  mg  IntraVenous Push  Every 6 Hours   PRN       6. hydrOXYzine Hydrochloride (ATARAX):  25  mg  Oral  Every 6 Hours   PRN       7. LORazepam:  1  mg  Oral  Every 8 Hours   PRN       8. Baclofen:  20  mg  Oral  4 Times a Day    9. Cyclobenzaprine:  10  mg   Oral  3 Times a Day   PRN         COAGULATION MODIFIERS:    1. Heparin SubCutaneous:  5000  unit(s)  SubCutaneous  Every 8 Hours      GASTROINTESTINAL AGENTS:    1. Bisacodyl Rectal:  10  mg  Rectal  Daily   PRN       2. Docusate:  100  mg  Oral  2 Times a Day    3. Fleet Adult (Sodium Phosphate) Rectal:  1  enema  Rectal  Daily   PRN       4. Polyethylene Glycol:  17  gram(s)  Oral  Daily   PRN       5. Sennosides:  2  tablet(s)  Oral  Daily    6. Pantoprazole:  40  mg  Oral  Daily      HORMONES/HORMONE MODIFIERS:    1. Levothyroxine:  100  microgram(s)  Oral  Daily      MISCELLANEOUS AGENTS:    1. Pyridostigmine:  60  mg  Oral  3 Times a Day      NUTRITIONAL PRODUCTS:    1. Lactated Ringers Infusion:  1000  mL  IntraVenous  <Continuous>    2. Iron Sucrose IV Piggy Back:  300  mg  IntraVenous Piggyback  Daily    3. Multivitamin with Minerals:  1  tablet(s)  Oral  Daily    4. Ascorbic Acid:  500  mg  Oral  2 Times a Day    5. Phytonadione (Vitamin K) IV Piggy Back:  10  mg  IntraVenous Piggyback  Once      PSYCHOTHERAPEUTIC AGENTS:    1. traZODone:  150  mg  Oral  At Bedtime    2. Venlafaxine Extended Release:  225  mg  Oral  Daily    3. Lithium Carbonate Extended Release:  300  mg  Oral  Every 12 Hours    4. QUEtiapine:  800  mg  Oral  Daily    5. risperiDONE (RISPERDAL):  0.5  mg  Oral  2 Times a Day      RADIOLOGIC AGENTS:    1. Gadoterate Meglumine (Dotarem-Radiology Contrast):  16.6  mL  IntraVenous Push  Once      RESPIRATORY AGENTS:    1. Albuterol 2.5 mg/ 3 mL Nebulizer Soln:  1.5  mL  Inhalation  Every 4 Hours    2. Formoterol 20 microgram/ 2 mL Neb Soln:  2  mL  Inhalation  Every 12 Hours      Assessment and Plan:   Comorbidities:  ·  Comorbidity Other     Code Status:  ·  Code Status Full Code     Assessment:    MANUEL GARCIA is a 52 year old Male with PMH of lumbar fusion and cervical myelopathy and stenosis s/p multiple spinal surgeries: C5-7 ACDF, C3-5 laminoplasty ([facility]  2020) with persistent  cervical stenosis s/p removal of laminoplasty hardware, C3-6 PCDF (9/9/22 per NSGY, Dr. Romeo) c/b cervical osteomyelitis s/p C4 corpectomy, C2-T2 PCDF (11/2/22 per NSGY, Dr. Romeo). Presented to [facility] ED on 3/6/23 for evaluation  of cervical spine wound dehiscence/drainage and RLE discomfort and swelling following a mechanical fall at home. First noted cervical spinal wound dehiscence and clear serous drainage in mid January 2023 which progressed to odorous, brown drainage 5 days  prior to presentation with intermittent fevers and malaise. Workup upon presentation notable for elevations in ESR and CRP to 101 and 8.54 respectively. CT cervical spine suboptimal but does reveal extension of the C7 spinous process beyond the level  of the surrounding skin with soft tissue thickening, but without discernable fluid collection. Workup with XR imaging and duplex US additionally obtained for patient's RLE swelling and pain which was essentially unremarkable for acute etiology. Patient  initiated on empiric antibiotics (IV Vanc/Zosyn) and admitted to the NSGY service pending remaining workup and consults. Plastic Surgery service consulted per NSGY for evaluation of patient's cervical spinal wound with consideration for assistance with  soft tissue coverage and definitive wound closure.       Plan:  - Plastics available for intraop assistance with spinal wound closure (likely via paraspinal vs. rotational     muscle flap) during anticipated debridement per NSGY as add on Thursday 3/9  - Appreciate perioperative medicine clearance for OR      ·Per periop recs: RCRI risk score is 1/5, given the surgical category is intermediate risk, the patient is at acceptable risk to proceed with surgery.   - Continue interim daily local wound care (nursing orders placed)      · Cleanse wound with saline then cover with Mepilex border dressing (change daily and PRN if soiled)  - Continue nutrition optimization with Vit C and daily MV       · Consider addition of Ensure nutritional shakes TID   - ABX per ID recs/primary   - Appreciate remaining supportive care per primary service   - Plastic Surgery service will continue to follow pending surgical intervention/involvement     BEREKET Combs  Plastic and Reconstructive Surgery  Doc Helder, Pager #[phone], Team phones: [phone]    Time spent on the assessment of patient, gathering and interpreting data, review of medical record/patient history, personally reviewing radiographic imaging and formulation of this note 30 minutes. With greater than 50% spent in personal discussion with  patient.          Electronic Signatures:  Alfredo Allen (JV-MEME)  (Signed 08-Mar-2023 09:30)   Authored: Service, Subjective Data, Objective Data, Assessment  and Plan, Note Completion      Last Updated: 08-Mar-2023 09:30 by Alfredo Allen (APRN-CNP)

## 2023-09-14 NOTE — PROGRESS NOTES
Service: Neurosurgery     Subjective Data:   MANUEL GARCIA is a 52 year old Male who is Hospital Day # 5.    Objective Data:     Objective Information:      T   P  R  BP   MAP  SpO2   Value  36.7  67  18  113/67      100%  Date/Time 3/10 4:12 3/10 4:12 3/10 4:12 3/10 4:12    3/10 4:12  Range  (35.9C - 37C )  (58 - 83 )  (18 - 20 )  (100 - 119 )/ (60 - 72 )    (97% - 100% )  Highest temp of 37 C was recorded at 3/9 12:32      Pain reported at 3/10 4:00: 3 = Mild    Physical Exam by System:    Neurological: RUE D4 B/T4+ HG/IO 4  LUE D4+ B/T 4+ HG/IO 4+  BLE 4+  dressing over exposed SP     Assessment and Plan:   Comorbidities:  ·  Comorbidity Other     Code Status:  ·  Code Status Full Code     Assessment:    52 M h/o asthma, hypothyroidism, MSSA bactremia, PEG tube, cervical myelopathy s/p C5-7 ACDF, C3-5 laminoplasty ([facility] 2020), s/p thoracic SCS, s/p lumbar surgery,  fibromyalgia, persistent cervical stenosis 9/9/22 s/p removal of laminoplasty hardware, C3-6 PCDF, c/b cervical osteomyelitis 11/2/22 s/p C4 corpectomy, C2-T2, p/w exposed spinous process, fall onto R knee, BLE DVT US neg, CT CS harware in posn, R T2  screw lucency with good fusion mass    Plan:  floor  OR cervical wound revision, plastic closure today  ID recs  plastics recs  periop medicine recs  nutrition recs  MRI C-Spine w/wo postop  neurology consult for worsening anxiety, subacute hand dystonia  SQH, SCDs    Attestation:   Note Completion:  I am a:  Resident/Fellow   Attending Attestation I saw and evaluated the patient.  I personally obtained the key and critical portions of the history and physical exam or was physically present for key and  critical portions performed by the resident/fellow. I reviewed the resident/fellow?s documentation and discussed the patient with the resident/fellow.  I agree with the resident/fellow?s medical decision making as documented in the note.     I personally evaluated the patient on 10-Mar-2023          Electronic Signatures:  Lorenzo Romeo)  (Signed 10-Mar-2023 13:11)   Authored: Note Completion   Co-Signer: Service, Subjective Data, Objective Data, Assessment and Plan, Note Completion  Jefe Laura (Resident))  (Signed 10-Mar-2023 07:30)   Authored: Service, Subjective Data, Objective Data, Assessment  and Plan, Note Completion      Last Updated: 10-Mar-2023 13:11 by Lorenzo Romeo)

## 2023-09-14 NOTE — PROGRESS NOTES
Service: Infectious Disease     Subjective Data:   MANUEL GARCIA is a 52 year old Male who is Hospital Day # 7 and POD #2 for 1. Paraspinous muscle flap;2. Complex closure;3. ;4. ;5.     Patient seen during midday rounds  Patient in bed  In no acute distress    Had no overnight fever, chills, rigors, chest pain, or shortness of breath.    Objective Data:     Objective Information:      T   P  R  BP   MAP  SpO2   Value  37.8  91  18  112/70      93%  Date/Time 3/12 16:51 3/12 15:23 3/12 15:23 3/12 15:23    3/12 15:23  Range  (36C - 37.9C )  (72 - 100 )  (18 - 20 )  (96 - 140 )/ (50 - 77 )    (93% - 100% )  Highest temp of 37.9 C was recorded at 3/12 15:23      Pain reported at 3/12 13:45: 5 = Moderate    Physical Exam Narrative:  ·  Physical Exam:    Supine in bed  Head of bed elevated  Pleasant and interactive  Normal conversation    Chronic facial wasting  Peripheral extremity wasting  Chronic right lower extremity flexion contracture (status post right TKA) and distal lower extremity edema and venous stasis    NECK:       Decrease forward and dorsiflexion, but reported stable per patient       Posterior cervical incision well approximated and showing no overt signs of infection.  Mild erythema and swelling.          THUY drains placed on the right and left side of the upper half of the incision    Lungs clear  Distant S1 and S2  Nontender abdomen      Medication:    Medications:      1. Vancomycin - RPh to Dose - IV Piggy Back:  1  each  As Specified  Variable      ANTI-INFECTIVES:    1. Cefepime IV Piggy Back:  2  gram(s)  IntraVenous Piggyback  Every 8 Hours      CENTRAL NERVOUS SYSTEM AGENTS:    1. Acetaminophen:  650  mg  Oral  Every 6 Hours   PRN       2. HYDROmorphone Injectable:  0.2  mg  IntraVenous Push  Every 3 Hours   PRN       3. oxyCODONE Immediate Release:  10  mg  Oral  Every 4 Hours   PRN       4. oxyCODONE Immediate Release:  5  mg  Oral  Every 4 Hours   PRN       5. Pregabalin:  150  mg   Oral  3 Times a Day    6. Dronabinol:  5  mg  Oral  2 Times a Day    7. Ondansetron Injectable:  4  mg  IntraVenous Push  Every 6 Hours   PRN       8. hydrOXYzine Hydrochloride (ATARAX):  25  mg  Oral  Every 6 Hours   PRN       9. LORazepam:  1  mg  Oral  Every 8 Hours   PRN       10. Baclofen:  20  mg  Oral  4 Times a Day    11. Cyclobenzaprine:  10  mg  Oral  3 Times a Day   PRN         COAGULATION MODIFIERS:    1. Heparin SubCutaneous:  5000  unit(s)  SubCutaneous  Every 8 Hours      GASTROINTESTINAL AGENTS:    1. Polyethylene Glycol:  17  gram(s)  Oral  Daily    2. Pantoprazole:  40  mg  Oral  Daily      HORMONES/HORMONE MODIFIERS:    1. Levothyroxine:  100  microgram(s)  Oral  Daily      MISCELLANEOUS AGENTS:    1. Pyridostigmine:  60  mg  Oral  3 Times a Day      NUTRITIONAL PRODUCTS:    1. Ascorbic Acid:  500  mg  Oral  2 Times a Day      PSYCHOTHERAPEUTIC AGENTS:    1. traZODone:  150  mg  Oral  At Bedtime    2. Lithium Carbonate Extended Release:  300  mg  Oral  Every 12 Hours    3. QUEtiapine:  800  mg  Oral  Daily    4. risperiDONE (RISPERDAL):  1  mg  Oral  2 Times a Day   PRN         RESPIRATORY AGENTS:    1. Albuterol 2.5 mg/ 3 mL Nebulizer Soln:  3  mL  Inhalation  4 Times a Day    2. Formoterol 20 microgram/ 2 mL Neb Soln:  2  mL  Inhalation  Every 12 Hours      TOPICAL AGENTS:    1. Lidocaine 4% TransDermal:  1  patch  TransDermal  Every 24 Hours      Recent Lab Results:    Results:    CBC: 3/12/2023 09:06              \     Hgb     /                              \     7.8 L    /  WBC  ----------------  Plt               7.3       ----------------    398              /     Hct     \                              /     26.3 L    \            RBC: 3.67 L    MCV: 72 L          RFP: 3/12/2023 09:06  NA+        Cl-     BUN  /                         139    108 H   15  /  --------------------------------  Glucose                ---------------------------  81    K+     HCO3-   Creat \                          4.7    26    0.57  \  Calcium : 8.2 LAnion Gap : 10          Albumin : 2.6 L     Phos : 3.8      Assessment and Plan:   Code Status:  ·  Code Status Full Code     Assessment:    Mr Means is a 52 year old gentleman with PMH of asthma and multiple cervical spine surgeries presented for an open cervical wound and drainage. Patient is now OFF  antibiotics. Wound site is clear, minimal drainage. Wound site is numb.          Patient has had chronic wound with exposure of spinous process of cervical vertebral body.       No gross purulence       Expect polymicrobial infection and associated osteomyelitis       To OR 3/10/23    3/10/23 Operative note excerpts:       Plastic Surgery            1. Extensive debridement  and 2. Adjacent tissue repair with rotation-advancement paraspinal-trapezius superiorly based muscle flap       Neurosurgery:  Wound exploration, washout, removal of C7 and T1 spinous process.   No purulence noted above or below fascia, intact fascia cranial to C7 spinous process         Preliminary cultures:  Proteus mirabilis (abx pending)      Proteus mirabilis infection (may  have other organism in light mixture)       final culture pending.  Antibiotic testing pending.  Continue current antibiotics pending final culture which may reveal other organisms         Cervical wound  Exposed cervical C7 spinous process and probable osteomyelitis    RLE with distal edema and chronic flexion contracture of knee (R. TKA )  Malnourished  s/p remote Gastric bypass and frequent wide swings in weight       (needs to resume supplements per his practitioners at [facility] or could consider consultation here at [facility] with bariatric team for interim advice on supplements)       (has seen dietician here and will need to improve nutrition, which will be important for healing of wound and resolution of infecton)           3/12/2023 update:            Spoke to the microbiology lab there may be a mixed infection which may  be difficult to separate from swarming Proteus.  We will attempt subculture today 3/12/2023 so final cultures will not be available until 3/14/2023.  We will still need PICC  line.            Patient still interested in following up with Dr. Singh (infectious diseases in [place]) with whom he has worked before.  We will try to facilitate and will contact Dr. Singh with ID plan and to confirm follow-up.  Anticipating 6 to 8 weeks  of antibiotics perhaps longer depending on clinical outcome and wound healing.  Wound healing may be delayed secondary to patient's chronic malnutrition.              THUS, it is imperative that patient have good dietary plan for maximizing nutrition (in setting of prior gastric bypass surgery and history of gaps in supplemental nutrition)      Recommendations    -  Await final culture and antibiotic susceptibility results  -  Continue vancomycin  -  Continue cefepime  -  Appreciate Pharm.D. assistance in dosing for trough levels 10-20 mcg/ml    Discussed with patient      ID Team B, pager [phone]    JANNET Viramontes MD  ID Staff  Pager [phone]            Electronic Signatures:  José Viramontes)  (Signed 12-Mar-2023 17:25)   Authored: Service, Subjective Data, Objective Data, Assessment  and Plan, Note Completion      Last Updated: 12-Mar-2023 17:25 by José Viramontes)

## 2023-09-14 NOTE — PROGRESS NOTES
Service: Neurosurgery     Subjective Data:   MANUEL GARCIA is a 52 year old Male who is Hospital Day # 3.    Objective Data:     Objective Information:      T   P  R  BP   MAP  SpO2   Value  36.2  89  18  131/79      94%  Date/Time 3/8 0:06 3/8 0:06 3/8 0:06 3/8 0:06    3/8 0:06  Range  (36.1C - 36.9C )  (67 - 89 )  (17 - 20 )  (104 - 131 )/ (53 - 79 )    (94% - 100% )  Highest temp of 36.9 C was recorded at 3/7 2:22      Pain reported at 3/7 21:58: 6 = Moderate    Physical Exam by System:    Neurological: RUE D4 B/T4+ HG/IO 4  LUE D4+ B/T 4+ HG/IO 4+  BLE 4+  dressing over exposed SP     Assessment and Plan:   Comorbidities:  ·  Comorbidity Other     Code Status:  ·  Code Status Full Code     Assessment:    52 M h/o asthma, hypothyroidism, MSSA bactremia, PEG tube, cervical myelopathy s/p C5-7 ACDF, C3-5 laminoplasty ([facility] 2020), s/p thoracic SCS, s/p lumbar surgery,  fibromyalgia, persistent cervical stenosis 9/9/22 s/p removal of laminoplasty hardware, C3-6 PCDF, c/b cervical osteomyelitis 11/2/22 s/p C4 corpectomy, C2-T2, p/w exposed spinous process, fall onto R knee, BLE DVT US neg, CT CS harware in posn, R T2  screw lucency with good fusion mass    Plan:  floor  OR 3/9 cervical wound revision, plastic closure  ID recs  plastics recs  periop medicine recs  nutrition recs  MRI C-Spine w/wo  SQH, SCDs    Nutrition Diagnosis:  ·  Nutrition Diagnosis      Agree with dietitian?s assessment and diagnoses as stated.  A new diagnosis of Moderate malnutrition related to chronic disease or condition related  to complex medical problems and increased metabolic demand  as evidence by moderate muscle mass loss and moderate loss of subcutaneous fat.      Attestation:   Note Completion:  I am a:  Resident/Fellow   Attending Attestation I reviewed the resident/fellow?s documentation and discussed the patient with the resident/fellow.  I agree with the resident/fellow?s medical  decision making as documented in the  note.          Electronic Signatures:  Lorenzo Romeo)  (Signed 09-Mar-2023 17:16)   Authored: Assessment and Plan, Note Completion   Co-Signer: Service, Subjective Data, Objective Data, Assessment and Plan, Note Completion  Claudio Servin (Resident))  (Signed 08-Mar-2023 02:12)   Authored: Service, Subjective Data, Objective Data, Assessment  and Plan, Note Completion      Last Updated: 09-Mar-2023 17:16 by Lorenzo Romeo)

## 2023-09-14 NOTE — PROGRESS NOTES
Service: Plastic Surgery     Subjective Data:   MANUEL GARCIA is a 52 year old Male who is Hospital Day # 10 and POD #4 for 1. Paraspinous muscle flap;2. Complex closure;3. ;4. ;5.     Pt just returned for Xray for knee. Overall pt states he is doing well. He states that he continues to have persistent burning pain/ sensation. Denies any issues with the THUY drain exit sites/ excessive  drainage. Hopeful for DC pending ID finalized recs.   Denies chest pain, SOB, abd pain, n/v/d.    Objective Data:     Objective Information:      T   P  R  BP   MAP  SpO2   Value  36.6  66  20  109/71      100%  Date/Time 3/15 7:27 3/15 7:27 3/15 7:27 3/15 7:27    3/15 7:27  Range  (36.5C - 37.2C )  (66 - 88 )  (20 - 20 )  (108 - 128 )/ (60 - 74 )    (95% - 100% )  Highest temp of 37.2 C was recorded at 3/14 5:00      Pain reported at 3/15 3:27: 7 = Severe    Physical Exam by System     Constitutional: NAD.   Eyes: EOMI. Sclera clear.   ENMT: MMM, no lesion/ulcer.   Head/Neck: NC/AT.   Respiratory/Thorax: Unlabored respiration on RA.   Genitourinary: Voiding independently.   Musculoskeletal: Chronic decreased cervical spine  ROM. Cervical incision C/D/I. Tissue surrounding incision line without erythema or edema.    THUY drain exit sites c/d with scant serous drainage. Surrounding tissue soft, non erythematous with no areas of expressible fluid.   Extremities: NEWTON x 4   Neurological: A&O x3.   Psychological: Appropriate mood/behavior.   Skin: Warm, dry and intact. See musculoskeletal exam.     Assessment and Plan:   Daily Risk Screen   ·  Does patient have an indwelling urinary catheter? n/a consulting service   ·  Does patient have a central line? n/a consulting service     Comorbidities   ·  Comorbidity Other     Code Status   ·  Code Status Full Code     Assessment:    MANUEL GARCIA is a 52 year old Male with PMH of lumbar fusion and cervical myelopathy and stenosis s/p multiple spinal surgeries: C5-7 ACDF, C3-5 laminoplasty  ([facility]  2020) with persistent cervical stenosis s/p removal of laminoplasty hardware, C3-6 PCDF (9/9/22 per NSGY, Dr. Romeo) c/b cervical osteomyelitis s/p C4 corpectomy, C2-T2 PCDF (11/2/22 per NSGY, Dr. Romeo). Presented to [facility] ED on 3/6/23 for evaluation  of cervical spine wound dehiscence/drainage and RLE discomfort and swelling following a mechanical fall at home. Plastic Surgery service consulted per NSGY for evaluation of patient's cervical spinal wound with consideration for assistance with soft tissue  coverage and definitive wound closure. Ultimately taken to the operating room on 3/10 for wound exploration, washout, removal of C7 and T1 spinous process per neurosurgery as well as paraspinal muscle flap and complex closure per plastic surgery.     A: Pt is progressing well post operatively.     Plan/Recommendations:   S/p paraspinal muscle flap, complex closure   - Cervical incision JEREMIAH  - x2 THUY drains to cervical spine removed per plastic surgery at bedside without complication 3/14   - Operative cultures 3/10: proteus mirabilis    - ID following, appreciate recs        · IV vancomycin, IV cefepime   - Continue pressure offload from cervical region   - Recommend nutrition optimization to promote wound healing if not contraindicated        · Zinc sulfate 220mg PO daily       · Vitamin C 500 mg PO daily       · Multivitamin daily       · Protein supplements (i.e: Ensure) TID  - Appreciate remaining supportive care per primary service  - Okay for discharge from plastic surgery perspective once medically stable and ID recommendations finalized    - Plastic Surgery follow up appointment scheduled with Dr. Perez, March 27, at 2:30 pm. (DP2 updated)     Plastic Surgery will sign off at this time. Please reconsult team with any further questions or concerns.       Merry Rivera PA-C   Plastic and Reconstructive Surgery    Available via Doc Halo, pager: [phone] or Team phones: [phone]     Time  spent on the assessment of patient, gathering and interpreting data, review of medical record/patient history, personally reviewing radiographic imaging and formulation of this note 30 minutes. With greater than 50% spent in personal discussion with  patient.                Electronic Signatures for Addendum Section:   Caesar Sarabia) (Signed Addendum 15-Mar-2023 23:11)   I rounded on Mr Posada today, which morris post operative day 4. His surgical site is intact with no discharge, no infection signs or wound breakdown. Patient  reported burning sensation in the right upper extremity which is unrelated to the site the flap was utilized from (the left side). We deferred the management of this pain to neurosurgery team. Aside from that, he was ready for discharge from plastic surgery  standpoint. I discussed with him my discharge recommendations, and answered his questions related to my surgery. He was in agreement with my plan.     Electronic Signatures:  Merry Rivera (PA (PHYSICIAN))  (Signed 15-Mar-2023 14:47)   Authored: Service, Subjective Data, Objective Data, Assessment  and Plan, Note Completion      Last Updated: 15-Mar-2023 23:11 by Caesar Sarabia)

## 2023-09-14 NOTE — PROGRESS NOTES
Service: Infectious Disease     Subjective Data:   MANUEL GARCIA is a 52 year old Male who is Hospital Day # 5.    Overnight Events: Patient had an uneventful night.   Additional Information:    Patient OR was postponed till today 3/10.   No fever, chills, headache, sob    Objective Data:     Objective Information:      T   P  R  BP   MAP  SpO2   Value  36.7  70  18  110/63      98%  Date/Time 3/10 9:28 3/10 9:28 3/10 9:28 3/10 9:28    3/10 9:28  Range  (35.9C - 37C )  (58 - 83 )  (18 - 20 )  (100 - 119 )/ (60 - 72 )    (97% - 100% )  Highest temp of 37 C was recorded at 3/9 12:32      Pain reported at 3/10 13:01: 6 = Moderate    Physical Exam Narrative:  ·  Physical Exam:    Patient seated upright on side of bed  NAD  alert and oriented x 3  Facial wasting     lungs clear  s1, s2    Cervical wound with mild redness and visible point of cervial spinous process. Some scant exudate  Minimal surrounding redness or swelling    Right leg:       RLE with chronic flexion contracture and with moderate, also with moderate, chronic distal edema and slight bluish skin discoloration       Mild tenderness to palpation of distal lower leg above ankle       Tattoo present       Healed R. TKA incision with moderate swelling of knee but without redness or tenderness       Small ulcer (1cm) overlying upper anterior tibial area with some redness but no exudate    normal conversation       Medication:    Medications:      1. Vancomycin - RPh to Dose - IV Piggy Back:  1  each  As Specified  Variable      ANTI-INFECTIVES:    1. Cefepime IV Piggy Back:  2  gram(s)  IntraVenous Piggyback  Every 8 Hours    2. Vancomycin IV Piggy Back:  1250  mg  IntraVenous Piggyback  Every 12 Hours      CENTRAL NERVOUS SYSTEM AGENTS:    1. Acetaminophen:  650  mg  Oral  Every 6 Hours   PRN       2. HYDROmorphone Injectable:  0.2  mg  IntraVenous Push  Every 3 Hours   PRN       3. oxyCODONE Immediate Release:  10  mg  Oral  Every 4 Hours   PRN        4. oxyCODONE Immediate Release:  5  mg  Oral  Every 4 Hours   PRN       5. Pregabalin:  150  mg  Oral  3 Times a Day    6. Dronabinol:  5  mg  Oral  2 Times a Day    7. Ondansetron Injectable:  4  mg  IntraVenous Push  Every 6 Hours   PRN       8. hydrOXYzine Hydrochloride (ATARAX):  25  mg  Oral  Every 6 Hours   PRN       9. LORazepam:  1  mg  Oral  Every 8 Hours   PRN       10. Baclofen:  20  mg  Oral  4 Times a Day    11. Cyclobenzaprine:  10  mg  Oral  3 Times a Day   PRN         COAGULATION MODIFIERS:    1. Heparin SubCutaneous:  5000  unit(s)  SubCutaneous  Every 8 Hours      GASTROINTESTINAL AGENTS:    1. Polyethylene Glycol:  17  gram(s)  Oral  Daily    2. Pantoprazole:  40  mg  Oral  Daily      HORMONES/HORMONE MODIFIERS:    1. Levothyroxine:  100  microgram(s)  Oral  Daily      MISCELLANEOUS AGENTS:    1. Pyridostigmine:  60  mg  Oral  3 Times a Day      NUTRITIONAL PRODUCTS:    1. Ascorbic Acid:  500  mg  Oral  2 Times a Day      PSYCHOTHERAPEUTIC AGENTS:    1. traZODone:  150  mg  Oral  At Bedtime    2. Lithium Carbonate Extended Release:  300  mg  Oral  Every 12 Hours    3. QUEtiapine:  800  mg  Oral  Daily    4. risperiDONE (RISPERDAL):  1  mg  Oral  2 Times a Day   PRN         RESPIRATORY AGENTS:    1. Albuterol 2.5 mg/ 3 mL Nebulizer Soln:  3  mL  Inhalation  4 Times a Day    2. Formoterol 20 microgram/ 2 mL Neb Soln:  2  mL  Inhalation  Every 12 Hours        Recent Lab Results:    Results:        I have reviewed these laboratory results:    Renal Function Panel  08-Mar-2023 20:11:00      Result Value    Glucose, Serum  121   H   NA  140    K  4.2    CL  108   H   Bicarbonate, Serum  28    Anion Gap, Serum  8   L   BUN  7    CREAT  0.53    GFR Male  >90    Calcium, Serum  8.1   L   Phosphorus, Serum  3.5    ALB  2.6   L     Complete Blood Count  08-Mar-2023 20:11:00      Result Value    White Blood Cell Count  6.0    Nucleated Erythrocyte Count  0.0    Red Blood Cell Count  3.46   L   HGB  7.4   L    HCT  24.9   L   MCV  72   L   MCHC  29.7   L   PLT  372    RDW-CV  18.2   H     Coagulation Screen  08-Mar-2023 20:11:00      Result Value    Prothrombin Time, Plasma  15.6   H   International Normalized Ratio, Plasma  1.3   H   Activated Partial Thromboplastin Time  48   H       Radiology Results:    Results:        Conclusion:  CONCLUSIONS:  1. Left ventricular systolic function is normal with a 55% estimated ejection fraction.  2. RVSP within normal limits.    QUANTITATIVE DATA SUMMARY:  2D MEASUREMENTS:  Normal Ranges:  LAs:    3.87 cm    (2.7-4.0cm)  IVSd:          1.07 cm    (0.6-1.1cm)  LVPWd:         0.83 cm    (0.6-1.1cm)  LVIDd:         5.67 cm    (3.9-5.9cm)  LVIDs:         3.77 cm  LV Mass Index: 100.3 g/m2  LV % FS        33.5 %    LA VOLUME:  Normal Ranges:  LA Vol A4C:        47.7 ml    (22+/-6mL/m2)  LA Vol A2C:        47.6 ml  LA Vol BP:         50.5 ml  LA Vol Index A4C:  22.8ml/m2  LA Vol Index A2C:  22.8 ml/m2  LA Vol Index BP:   24.1 ml/m2  LA Area A4C:       17.4 cm2  LA Area A2C:       16.4 cm2  LA Major Axis A4C: 5.4 cm  LA Major Axis A2C: 4.8 cm  LA Volume Index:   24.0 ml/m2  LA Vol A4C:        42.8 ml  LA Vol A2C:        42.4 ml    RA VOLUME BY A/L METHOD:  Normal Ranges:  RA Area A4C: 17.2 cm2    LV SYSTOLIC FUNCTION BY 2D PLANIMETRY (MOD):  Normal Ranges:  EF-A4C View: 52.4 % (>=55%)  EF-A2C View: 48.5 %  EF-Biplane:  50.9 %    LV DIASTOLIC FUNCTION:  Normal Ranges:  MV Peak E:        1.16 m/s    (0.7-1.2 m/s)  MV Peak A:        0.38 m/s    (0.42-0.7 m/s)  E/A Ratio:        3.08  (1.0-2.2)  MV e'             0.12 m/s    (>8.0)  MV A Dur:         166.09 msec  E/e' Ratio:       9.70        (<8.0)  PulmV Sys Willian:    47.73 cm/s  PulmV Poole Willian:   62.69 cm/s  PulmV S/D Willian:    0.76  PulmV A Revs Willian: 29.14 cm/s  PulmV A Revs Dur: 166.09 msec    MITRAL VALVE:  Normal Ranges:  MV DT: 210 msec (150-240msec)    AORTIC VALVE:  Normal Ranges:  AoV Vmax:      1.28 m/s (<=1.7m/s)  AoV Peak PG:    6.6 mmHg (<20mmHg)  LVOT Max Willian:  0.98 m/s (<=1.1m/s)  LVOT VTI:      25.57 cm  LVOT Diameter: 2.20 cm  (1.8-2.4cm)  AoV Area,Vmax: 2.92 cm2 (2.5-4.5cm2)    RIGHT VENTRICLE:  TAPSE: 24.0 mm  RV s'  0.12 m/s    TRICUSPID VALVE/RVSP:  Normal Ranges:  Peak TR Velocity: 2.07 m/s  RV Syst Pressure: 20.1 mmHg (< 30mmHg)    PULMONIC VALVE:  Normal Ranges:  PV Accel Time: 184 msec (>120ms)  PV Max Willian:    0.9 m/s  (0.6-0.9m/s)  PV Max PG:     3.4 mmHg    Pulmonary Veins:  PulmV A Revs Dur: 166.09 msec  PulmV A Revs Willian: 29.14 cm/s  PulmV Poole Willian:   62.69 cm/s  PulmV S/D Willian:    0.76  PulmVSys Willian:    47.73 cm/s    AORTA:  Asc Ao Diam 3.10 cm      [id number] Jacky Chao MD  Electronically signed on 3/9/2023 at 12:40:01 PM        *** Final ***     Echocardiogram [Mar  9 2023 12:40PM]      Assessment and Plan:   Code Status:  ·  Code Status Full Code     Assessment:    Mr Means is a 52 year old gentleman with PMH of asthma and multiple cervical spine surgeries presented for an open cervical wound and drainage. Patient is now OFF  antibiotics. Wound site is clear, minimal drainage. Wound site is numb.          Patient has had chronic wound with exposure of spinous process of cervical vertebral body.       No gross purulence       Expect polymicrobial infection and associated osteomyelitis       To OR 3/10/23    Cervical wound  Exposed cervical spinous process and probable osteomyelitis    RLE with distal edema and chronic flexion contracture of knee (R. TKA )  Malnourished  s/p remote Gastric bypass and frequent wide swings in weight       (needs to resume supplements per his practitioners at [facility] or could consider consultation here at [facility] with bariatric team for interim advice on supplements)       (has seen dietician here and will need to improve nutrition, which will be important for healing of wound and resolution of infecton)      Recommendations    -  Will follow up OR cultures  -  After surgery antibiotic  regimen:        start Vancomycin 1250 mg iv every 12 hours       start Cefepime 2 g iv every 8 hours       Dose to renal function       Appreciate Pharm.D. assistance in dosing for trough levels 10-20 mcg/ml    ID Team B, pager [phone]      Saadia Willams, MS4  and   JANNET Viramontes MD  ID Staff  Pager [phone]          Attestation:   Note Completion:  I am a:  Medical Student/Acting Intern   Medical Student Attestation I, or a resident under my supervision, was present with the medical student who participated in the documentation of this note.  I have personally seen and examined the patient and performed the medical decision-making components. I have reviewed the medical student documentation and/or resident documentation and verified the findings in the note as written with additions or exceptions  as stated in the body of this note.    I personally evaluated the patient on 10-Mar-2023         Electronic Signatures:  José Viramontes)  (Signed 11-Mar-2023 08:14)   Authored: Subjective Data, Objective Data, Assessment  and Plan, Note Completion   Co-Signer: Note Completion  Saadia Willams (MED STUD)  (Signed 10-Mar-2023 13:37)   Authored: Service, Assessment/Plan Review, Subjective  Data, Objective Data, Assessment and Plan, Note Completion      Last Updated: 11-Mar-2023 08:14 by José Viramontes)

## 2023-09-14 NOTE — PROGRESS NOTES
Service: Neurosurgery     Subjective Data:   MANUEL GARCIA is a 52 year old Male who is Hospital Day # 3.    Objective Data:     Objective Information:      T   P  R  BP   MAP  SpO2   Value  36  80  18  101/59      99%  Date/Time 3/8 15:57 3/8 15:57 3/8 15:57 3/8 15:57    3/8 15:57  Range  (35.3C - 36.9C )  (63 - 89 )  (16 - 20 )  (101 - 131 )/ (53 - 79 )    (94% - 100% )  Highest temp of 36.9 C was recorded at 3/7 2:22      Pain reported at 3/8 18:37: 6 = Moderate    ---- Intake and Output  -----  Mn/Dy/Year Time  Intake   Output  Net  Mar 7, 2023 10:00 pm  0   0  0      Physical Exam by System:    Neurological: RUE D4 B/T4+ HG/IO 4  LUE D4+ B/T 4+ HG/IO 4+  BLE 4+  dressing over exposed SP     Assessment and Plan:   Comorbidities:  ·  Comorbidity Other     Code Status:  ·  Code Status Full Code     Assessment:    52 M h/o asthma, hypothyroidism, MSSA bactremia, PEG tube, cervical myelopathy s/p C5-7 ACDF, C3-5 laminoplasty ([facility] 2020), s/p thoracic SCS, s/p lumbar surgery,  fibromyalgia, persistent cervical stenosis 9/9/22 s/p removal of laminoplasty hardware, C3-6 PCDF, c/b cervical osteomyelitis 11/2/22 s/p C4 corpectomy, C2-T2, p/w exposed spinous process, fall onto R knee, BLE DVT US neg, CT CS harware in posn, R T2  screw lucency with good fusion mass    Plan:  floor  OR 3/9 cervical wound revision, plastic closure  ID recs  plastics recs  periop medicine recs  nutrition recs  MRI C-Spine w/wo  SQH, SCDs      Electronic Signatures:  Lorenzo Romeo)  (Signed 09-Mar-2023 17:17)   Authored: Note Completion   Co-Signer: Service, Subjective Data, Objective Data, Assessment and Plan  Claudio Servin (Resident))  (Signed 09-Mar-2023 01:51)   Authored: Service, Subjective Data, Objective Data, Assessment  and Plan      Last Updated: 09-Mar-2023 17:17 by Lorenzo Romeo)

## 2023-09-14 NOTE — PROGRESS NOTES
Service: Plastic Surgery     Subjective Data:   MANUEL GARCIA is a 52 year old Male who is Hospital Day # 8 and POD #2 for 1. Paraspinous muscle flap;2. Complex closure;3. ;4. ;5.     Patient continues to note burning pain that spans across his upper back, attempted lidocaine patch yesterday with minimal response. No ROM issues. x2 THUY drains to cervical region with 30cc and 20 cc  output respectively in last 24 hours. Low grade fever overnight to Tmax of 37.9, resolved this AM. Denies chest pain, SOB, abd pain, n/v/d.    Objective Data:     Objective Information:      T   P  R  BP   MAP  SpO2   Value  35.8  74  18  110/67      97%  Date/Time 3/13 8:09 3/13 8:09 3/13 8:09 3/13 8:09    3/13 8:09  Range  (35.5C - 37.9C )  (74 - 100 )  (18 - 20 )  (96 - 140 )/ (62 - 80 )    (93% - 100% )  Highest temp of 37.9 C was recorded at 3/12 15:23      Pain reported at 3/13 8:45: 7 = Severe    Physical Exam by System:    Constitutional: NAD.   Eyes: EOMI. Sclera clear.   ENMT: MMM, no lesion/ulcer.   Head/Neck: NC/AT.   Respiratory/Thorax: Unlabored respiration on RA.   Genitourinary: Voiding independently.   Musculoskeletal: Chronic decreased cervical spine  ROM. Cervical incision C/D/I. Tissue surrounding incision line without erythema or edema.  x2 THUY drains exiting from both sides of cervical spine with scant bloody serosanguineous output.   Extremities: Lower extremity edema (R>L).   Neurological: A&O x3.   Psychological: Appropriate mood/behavior.   Skin: Warm, dry and intact. See musculoskeletal exam.     Medication:    Medications:          Continuous Medications       --------------------------------  No continuous medications are active       Scheduled Medications       --------------------------------    1. Acetaminophen:  650  mg  Oral  Every 6 Hours    2. Albuterol 2.5 mg/ 3 mL Nebulizer Soln:  3  mL  Inhalation  4 Times a Day    3. Ascorbic Acid:  500  mg  Oral  2 Times a Day    4. Baclofen:  20  mg  Oral  4  Times a Day    5. Cefepime IV Piggy Back:  2  gram(s)  IntraVenous Piggyback  Every 8 Hours    6. Dronabinol:  5  mg  Oral  2 Times a Day    7. Formoterol 20 microgram/ 2 mL Neb Soln:  2  mL  Inhalation  Every 12 Hours    8. Heparin SubCutaneous:  5000  unit(s)  SubCutaneous  Every 8 Hours    9. Levothyroxine:  100  microgram(s)  Oral  Daily    10. Lidocaine 4% TransDermal:  1  patch  TransDermal  Every 24 Hours    11. Lithium Carbonate Extended Release:  300  mg  Oral  Every 12 Hours    12. Pantoprazole:  40  mg  Oral  Daily    13. Polyethylene Glycol:  17  gram(s)  Oral  Daily    14. Pregabalin:  150  mg  Oral  3 Times a Day    15. Pyridostigmine:  60  mg  Oral  3 Times a Day    16. QUEtiapine:  800  mg  Oral  Daily    17. traZODone:  150  mg  Oral  At Bedtime    18. Vancomycin - RPh to Dose - IV Piggy Back:  1  each  As Specified  Variable    19. Vancomycin IV Piggy Back:  1500  mg  IntraVenous Piggyback  Every 12 Hours         PRN Medications       --------------------------------    1. Cyclobenzaprine:  10  mg  Oral  3 Times a Day    2. hydrOXYzine Hydrochloride (ATARAX):  25  mg  Oral  Every 6 Hours    3. LORazepam:  1  mg  Oral  Every 8 Hours    4. Ondansetron Injectable:  4  mg  IntraVenous Push  Every 6 Hours    5. oxyCODONE Immediate Release:  2.5  mg  Oral  Every 4 Hours    6. oxyCODONE Immediate Release:  10  mg  Oral  Every 4 Hours    7. oxyCODONE Immediate Release:  5  mg  Oral  Every 4 Hours    8. risperiDONE (RISPERDAL):  1  mg  Oral  2 Times a Day    9. Sennosides:  1  tablet(s)  Oral  2 Times a Day        Recent Lab Results:    Results:    CBC: 3/13/2023 01:56              \     Hgb     /                              \     Canceled       /  WBC  ----------------  Plt               Canceled       ----------------    Canceled              /     Hct     \                              /     Canceled       \            RBC: Canceled     MCV: Canceled           RFP: 3/13/2023 01:56  NA+        Cl-      BUN  /                         Canceled    Canceled    Canceled  /  --------------------------------  Glucose                ---------------------------  Canceled    K+     HCO3-   Creat \                         Canceled    Canceled    Canceled  \  Calcium : CanceledAnion Gap : Canceled          Albumin : Canceled     Phos : Canceled The performance characteristics of phosphorus testing in   heparinized plasma have been validated by the individual     laboratory site where testing is performed. Testing    on heparinized plasma is not approved by the FDA;    however, suc      Assessment and Plan:   Comorbidities:  ·  Comorbidity Other     Code Status:  ·  Code Status Full Code     Assessment:    MANUEL GARCIA is a 52 year old Male with PMH of lumbar fusion and cervical myelopathy and stenosis s/p multiple spinal surgeries: C5-7 ACDF, C3-5 laminoplasty ([facility]  2020) with persistent cervical stenosis s/p removal of laminoplasty hardware, C3-6 PCDF (9/9/22 per NSGY, Dr. Romeo) c/b cervical osteomyelitis s/p C4 corpectomy, C2-T2 PCDF (11/2/22 per NSGY, Dr. Romeo). [facility]ed [facility] ED [facility] 3/6/23 for evaluation  of cervical spine wound dehiscence/drainage and RLE discomfort and swelling following a mechanical fall at home. Plastic Surgery service consulted per NSGY for evaluation of patient's cervical spinal wound with consideration for assistance with soft tissue  coverage and definitive wound closure. Ultimately taken to the operating room on 3/10 for wound exploration, washout, removal of C7 and T1 spinous process per neurosurgery as well as paraspinal muscle flap and complex closure per plastic surgery.     Plan/Recommendations:   S/p paraspinal muscle flap, complex closure   - Cervical incision JEREMIAH  - Continue THUY drain care per nursing (x2 to cervical spine)        · Empty and record output at least every 8 hours       · Strip drains TID and PRN to avoid drain obstruction        · Anticipate removal  at bedside 3/14   - Operative cultures 3/10: proteus mirabilis    - ID following, appreciate recs        · IV vancomycin, IV cefepime   - Continue pressure offload from cervical region   - Recommend nutrition optimization to promote wound healing if not contraindicated        · Zinc sulfate 220mg PO daily       · Vitamin C 500 mg PO daily       · Multivitamin daily       · Protein supplements (i.e: Ensure) TID  - Appreciate remaining supportive care per primary service  - Okay for discharge from plastic surgery perspective once medically stable and ID recommendations finalized    - Follow up appointment to be requested closer to anticipated date of discharge   - Plastic surgery will continue to follow in post operative period     Patient and plan discussed with Dr. Grady PAMarijaC   Plastic and Reconstructive Surgery   Available via Doc Halo, pager: [phone] or Team phones: [phone]     Time spent on the assessment of patient, gathering and interpreting data, review of medical record/patient history, personally reviewing radiographic imaging and formulation of this note 30 minutes. With greater than 50% spent in personal discussion with  patient.         Electronic Signatures:  Carlene Abernathy (PAC)  (Signed 13-Mar-2023 09:58)   Authored: Service, Subjective Data, Objective Data, Assessment  and Plan, Note Completion      Last Updated: 13-Mar-2023 09:58 by Carlene Abernathy (PAC)

## 2023-09-14 NOTE — PROGRESS NOTES
Service: Neurosurgery     Subjective Data:   MANUEL GARCIA is a 52 year old Male who is Hospital Day # 11 and POD #5 for 1. Paraspinous muscle flap;2. Complex closure;3. ;4. ;5.    Objective Data:     Objective Information:      T   P  R  BP   MAP  SpO2   Value  36.8  89  20  129/78      96%  Date/Time 3/15 23:33 3/15 23:33 3/15 23:33 3/15 23:33    3/15 23:33  Range  (36.6C - 36.8C )  (66 - 89 )  (18 - 20 )  (109 - 129 )/ (69 - 78 )    (96% - 100% )      Pain reported at 3/15 22:16: 7 = Severe    Physical Exam by System:    Neurological: RUE D4 B/T4+ HG/IO 4  LUE D4+ B/T 4+ HG/IO 4+  BLE 4+     Recent Lab Results:    Results:    CBC: 3/13/2023 09:41              \     Hgb     /                              \     7.2 L    /  WBC  ----------------  Plt               6.8       ----------------    371              /     Hct     \                              /     23.7 L    \            RBC: 3.29 L    MCV: 72 L          RFP: 3/13/2023 09:41  NA+        Cl-     BUN  /                         140    108 H   14  /  --------------------------------  Glucose                ---------------------------  56 L    K+     HCO3-   Creat \                         4.3    27    0.55  \  Calcium : 8.1 LAnion Gap : 9 L          Albumin : 2.4 L    Phos : 3.4      Assessment and Plan:   Daily Risk Screen:  ·  Does patient have a central line? yes   ·  Central Line Type PICC   ·  Plan for PICC removal today? no   ·  The patient continues to require a PICC for parenteral medication     Comorbidities:  ·  Comorbidity Other     Code Status:  ·  Code Status Full Code     Assessment:    52 M h/o asthma, hypothyroidism, MSSA bactremia, PEG tube, cervical myelopathy s/p C5-7 ACDF, C3-5 laminoplasty ([facility] 2020), s/p thoracic SCS, s/p lumbar surgery,  fibromyalgia, persistent cervical stenosis 9/9/22 s/p removal of laminoplasty hardware, C3-6 PCDF, c/b cervical osteomyelitis 11/2/22 s/p C4 corpectomy, C2-T2, p/w exposed spinous  process, fall onto R knee, BLE DVT US neg, CT CS harware in posn, R T2  screw lucency with good fusion mass    3/10 s/p cervical wound revision w/ plastics  3/13 s/p picc  3/15 drains dc'd    Plan:  floor  appreciate ortho recs re R knee  ID recs - vanc, CTX, f/u w/ Dr. Singh in [place]  PTOT - OP  nutrition recs  SQH, SCDs    will discuss dispo today pending abx setup    Please page Neurosurgery pager [phone]] with any questions or concerns during the day.  Progress note authored by On Call resident. [facility] messages will have delayed responses.     Attestation:   Note Completion:  I am a:  Resident/Fellow   Attending Attestation I reviewed the resident/fellow?s documentation and discussed the patient with the resident/fellow.  I agree with the resident/fellow?s medical  decision making as documented in the note.          Electronic Signatures:  Lorenzo Romeo)  (Signed 16-Mar-2023 08:11)   Authored: Note Completion   Co-Signer: Service, Subjective Data, Objective Data, Assessment and Plan, Note Completion  Jefe Laura (Resident))  (Signed 16-Mar-2023 02:59)   Authored: Service, Subjective Data, Objective Data, Assessment  and Plan, Note Completion      Last Updated: 16-Mar-2023 08:11 by Lorenzo Romeo (MD)

## 2023-09-14 NOTE — H&P
History of Present Illness:   Service:  Service: Surgery     History Present Illness:  Admission Reason: fall   HPI:    The patient is a 52 year old male with a history of cervical myelopathy s/p C5-7 ACDF, C3-5 laminoplasty ([facility] 2020), s/p thoracic SCS, s/p lumbar fusion, fibromyalgia,  persistent cervical stenosis 9/9/22 s/p removal of laminoplasty hardware, C3-6 PCDF, c/b cervical osteomyelitis, 11/2/22 s/p C4 corpectomy, C2-T2 PCDF who now presents after a fall in his shower onto his right side. Patient reports a mechanical fall in  his shower yesterday where the bar broke. He has chronic knee wound from a brace and chronic lower extremity edema, but this is all significantly worse since the fall and his right knee pain is worse.   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 He has a home health team that changes the dressings, it has brownish fluid output and slight foul odor  lately. He denies fevers, chills, endorses generalized fatigue due to the leg edema.    Imaging includes- no new cervical spine imaging    PMHx: per above    PSHx: per above    Social Hx: lives at home    Allergies: NKDA    Medications: per above           Allergies:  ·  NKDA :   ·  Tape  - Adhesive, Bandaids, Paper: Itching    Medications Prior to Admission:   Admission Medication Reconciliation has not been completed for this patient.      Objective Information:    Objective Information:      T   P  R  BP   MAP  SpO2   Value  36.9  80  18  112/57      96%  Date/Time 3/7 2:22 3/7 2:22 3/7 2:22 3/7 2:22    3/7 2:22  Range  (36.9C - 36.9C )  (80 - 85 )  (16 - 20 )  (112 - 129 )/ (57 - 74 )    (96% - 99% )  Highest temp of 36.9 C was recorded at 3/6 20:20      Pain reported at 3/7 1:59: 1 = Mild    Physical Exam by System:    Constitutional: cachectic appearing   Eyes: normal sclera   ENMT: mmm   Head/Neck: neck with mostly well  healed incision  but focal area of dehiscence with exposed spinous process and brownish discharge   Extremities: bilateral lower extremity pitting edema  to the knees with distal erythema and tenderness   Neurological: alert, appropriate  RUE D4 B/T4+ HG/IO 4  LUE D4+ B/T 4+ HG/IO 4+  BLE 4+ diffusely due to edema     Recent Lab Results:    Results:    CBC: 3/6/2023 20:44              \     Hgb     /                              \     8.1 L    /  WBC  ----------------  Plt               8.0       ----------------    418              /     Hct     \                              /     26.0 L    \            RBC: 3.81 L    MCV: 68 L    Neutrophil  %: 72.6      CMP: 3/6/2023 20:44  NA+        Cl-     BUN  /                         137    105    11  /  --------------------------------  Glucose                ---------------------------  114 H    K+     HCO3-   Creat \                         4.0    26    0.55  \           \  T Bili  /                    \  0.3  /  AST  x ---- x ALT        18 x ---- x 13         /  Alk P   \               /  109  \  Calcium : 8.5 L    Anion Gap : 10     Albumin : 3.1 L     T Protein : 6.9           Coagulation: 3/6/2023 21:35  PT  /                    15.8 H /  -------<    INR          ----------<      1.4 H  PTT\                    45 H \                       Assessment and Plan:   Assessment:    The patient is a 52 year old male with a history of cervical myelopathy s/p C5-7 ACDF, C3-5 laminoplasty ([facility]), s/p thoracic SCS, s/p lumbar fusion, fibromyalgia,  persistent cervical stenosis 9/9/22 s/p removal of laminoplasty hardware, C3-6 PCDF, c/b cervical osteomyelitis, 11/2/22 s/p C4 corpectomy, C2-T2 PCDF who now presents after a fall in his shower onto his right side. Patient reports a mechanical fall in  his shower yesterday where the bar broke. He has chronic knee wound from a brace and chronic lower extremity edema, but this is all significantly worse since the fall  and his right knee pain is worse.   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 He has a home health team that changes the dressings, it has brownish fluid output and slight foul odor  lately. He denies fevers, chills, endorses generalized fatigue due to the leg edema.    Recommendations:  would recommend medicine evaluation for lower extremity edema, erythema, tenderness  infectious workup given fatigue and lower extremity findings  his wound has chronic dehiscence but given increased drainage can obtain MRI c spine with and without contrast and CT c spine without contrast  surgical intervention for this would require joint case with plastics surgery, will try to coordinate this admission but may not be feasible  Please page with any questions or concerns.     Attestation:   Note Completion:  I am a:  Resident/Fellow   Attending Attestation I reviewed the resident/fellow?s documentation and discussed the patient with the resident/fellow.  I agree with the resident/fellow?s medical  decision making as documented in the note.          Electronic Signatures:  Joya Hall (Resident))  (Signed 07-Mar-2023 03:33)   Authored: History of Present Illness, Comorbidities,  Allergies, Medications Prior to Admission, Objective, Assessment and Plan, Note Completion  Lorenzo Romeo)  (Signed 09-Mar-2023 17:14)   Authored: Note Completion   Co-Signer: History of Present Illness, Comorbidities, Allergies, Medications Prior to Admission, Objective, Assessment and Plan, Note Completion      Last Updated: 09-Mar-2023 17:14 by Lorenzo Romeo)

## 2023-10-02 NOTE — OP NOTE
PROCEDURE DETAILS    Preoperative Diagnosis:  Wound infection, T14.8XXA  Postoperative Diagnosis:  Wound infection, T14.8XXA  Surgeon: Dr. Lorenzo Romeo  Resident/Fellow/Other Assistant: Dr. Jefe Jack    Procedure:  1. Wound exploration, washout, removal of C7 and T1 spinous process  Estimated Blood Loss: 50  Findings: no purulence noted above or below fascia, intact fascia cranial to C7 spinous process  Specimens(s) Collected: yes,  posterior cervical fluid         Operative Report:   Date of operation: 03/10/2023    Preoperative diagnosis: wound dehiscence    Postoperative diagnosis: wound dehiscence    Procedure: wound exploration; removal of C7 and T1 spinous processes    Surgeon: Lorenzo Romeo MD PhD    Assistant surgeons: Jefe Laura MD, Alfredo Jack MD    Anesthesia: general    Specimens: posterior cervical fluid    Complications: none    Estimated blood loss: 50 mL    Indications: This is a 52-year-old male with a history of multiple prior surgeries and recurrent infections on whom I previously performed an anterior and posterior cervical decompression and fusion on November 2, 2022.  He has a history of severe nutritional  deficiencies following prior bariatric surgery.  In January 2023 I was notified that he had developed a wound complication in that the C7 spinous process had eroded through the skin.  Due to the patient?s overall state of health and scheduling  conflicts, he had missed follow-up appointments and a scheduled wound revision.  He presented to the emergency department on March 6, 2023 following a fall at home.  He also reported increased drainage from the cervical wound.  I offered a wound exploration  and revision with assistance from Plastic Surgery.  I discussed the risks of surgery, including infection, blood loss, neurologic injury, spinal fluid leak, and the need for further procedures.  Having acknowledged the risks and benefits, the  patient  elected to proceed with surgery.    Description of surgery: The patient was brought to the operating room.  After patient identification and procedure confirmation, an endotracheal intubation was performed.  The patient was flipped prone onto the operating table with gel rolls.  The head  and arms were secured and all pressure points were padded.  The field was prepped and draped in the usual sterile fashion.  A time out was performed.    The prior incision was reopened with a #10 blade, from approximately C5 to T2.  With the use of monopolar electrocautery, the C7 and T1 spinous processes were dissected.  Fluid was sent for culture, after which time perioperative antibiotics were administered.   Using a Leksell rongeur, the C7 and T1 spinous processes were removed.  We then turned the surgery over to the Plastic Surgery team for the complex wound closure.  Please see Dr. Caesar Perez?s separately dictated operative note for further details.    _____________________________  Lorenzo Romeo MD PhD                          Attestation:   Note Completion:  Attending Attestation I was present for key portions of the procedure and the procedure lasted longer than 5 minutes.    I am a: Resident/Fellow         Electronic Signatures:  Lorenzo Romeo (MD)  (Signed 12-Mar-2023 16:44)   Authored: Post-Operative Note, Chart Review, Note Completion   Co-Signer: Post-Operative Note, Chart Review, Note Completion  Jefe Laura (Resident))  (Signed 10-Mar-2023 15:27)   Authored: Post-Operative Note, Chart Review, Note Completion      Last Updated: 12-Mar-2023 16:44 by Lorenzo Romeo (MD)

## 2023-10-02 NOTE — OP NOTE
Post Operative Note:     PreOp Diagnosis: Cervical Spine wound dehiscence  with exposure of spinal process   Post-Procedure Diagnosis: Cervical Spine wound dehiscence  with exposure of spinal porcess   Procedure: 1. Extensive debridement   2. Adjacent tissue repair with rotation-advancement paraspinal-trapezius superiorly based muscle flap  3.Complex closure   4.   5.   Surgeon: Dr. Caesar Perez   Resident/Fellow/Other Assistant: John Oleary MD,  Khoa Kirk APRN/RNFZAIDA   Anesthesia: General   I.V. Fluids: 900 mL   Estimated Blood Loss (mL): 100 mL   Specimen: yes. culture per neuro   Findings: Exposed spinous process with red/pink healthy  underlying muscle/tissue   Patient Returned To/Condition: PACU/Stable   Drains and/or Catheters: 15 fr channel x2     Operative Report Dictated:  Dictation: not applicable - note contains Operative  Report    Operative Report:    Indications: MANUEL GARCIA is a 52 year old Male with PMH  of asthma, anxiety, depression, hypothyroidism, hx gastric bypass c/b gastric ulcer requiring gastrostomy tube placement, R femur fracture s/p repair c/b hardware infection and multiple revisions, thoracic SCS, fibromyalgia, hx of lumbar fusion and cervical  myelopathy and stenosis s/p multiple spinal surgeries: C5-7 ACDF, C3-5 laminoplasty ([facility]) with persistent cervical stenosis s/p removal of laminoplasty hardware, C3-6 PCDF (9/9/22 per NSGY, Dr. Romeo) c/b cervical osteomyelitis s/p C4 corpectomy,  C2-T2 PCDF (11/2/22 per NSGY, Dr. Romeo). Presented with cervical spine wound dehiscence/drainage.  In mid January 2023, he began to appreciate spinal wound dehiscence with clear serous drainage which gradually progressed over time with exposure of the underlying cervical spinous process. CT cervical spine suboptimal but does reveal extension of the  C7 spinous process beyond the level of the surrounding skin with soft tissue thickening, but without discernable fluid collection.    Patient initiated on empiric antibiotics (IV Vanc/Zosyn) and admitted to the NSGY service.   He is indicated for excisional debridement down to bone level by neurosurgery team and adjacent tissue repair with complex closure by plastic surgery team.    Informed consent: I met with Miguelangel prior to his surgery and discussed with him the complexity fo this wound, its course if not treated, treatment options, indications and contraindications for our  planned procedure, and risks including infection, bleeding  and hematoma, partial or total necrosis of the flap(s), wound healing issues, pain, and need for additional surgeries. Patient showed good understanding and wanted me to proceed. Consent was signed by appropriate parties.     Procedure in detail:  I entered the room while the patient was under general anaesthesia under the care of neurosurgery team. BY then the neurosurgery team has finished their part and the patient was in stable condition. I took a minute to appreciate the wound.  The spine wound was elliptical in shape, measured 8.5*3.5 subcutaneous cm, and its depth was 5 cm. The spinal processes were excised.   I then did hemostasis with bone wax and shifted my focus to the paraspinal muscles. I first performed extensive undermining of the back skin over the spinal-trapezius muscles for 5 cm along the wound length on the left, and 3 cm along the wound length  on the right. I did the undermining using bovie. After that I shifted my focus on the left paraspinal muscles which were dissected off of their origin and then rotation advancement flap superiorly based was designed and advanced 3 cm: So primary defect  was  29.75 Sq cm, and secondary defect was 5*8 Sq cm . The left paraspinal muscles were dissected off of their origin and mobilized for additional 1 cm.   Meticulous hemostasis was achieved by aid of bovie and bipolar.  After that I started defect closure: The paraspinal muscles were repaired in two  layers: 0 PDS for the deeper layer and 2/0 PDS for the superficial layer, buried knots. The donor site of the rotation advancement muscular flap was also repaired with buried  2/0 PDS. Then the skin at wound edges for 1.5 cm wide was deepithelialized and repaired together in 3 layers as follows: The deeper dermal layer was anchored to the muscle facia on the left side away from the muscle repair at the midline and 3/0 Moncryl  was used. The intermediate dermal layer was then repaired with 3/monocryl and was dermis to dermis. Finally the superficial dermal layer was repaired with 4/0 monocryl in subcuticular manner.    2 15 fr drains were left during closure to drain the space directly on bone, and the space under the skin over the muscular layer.   Patient stood surgery well, and awakened and transferred to PACU in stable condition.   Post operative orders:  Patient to return to neurosurgery care, continue antibiotics per ID recommendations, Monitor drains and plan on removal after the output goes down to under 20 cc in 2 consecutive days with absence of hematoma or seroma formation on wound inspection, patient  can shower after 24 hours but no scrubbing/rubbing of the wound. Arrange F/U visit after 2 weeks         Attestation:   Note Completion:  Attending Attestation I was present for the entire procedure          Electronic Signatures:  Caesar Sarabia)  (Signed 11-Mar-2023 11:51)   Authored: Post Operative Note, Note Completion   Co-Signer: Post Operative Note, Note Completion  Khoa Kirk (APRN-CNP)  (Signed 10-Mar-2023 16:14)   Authored: Post Operative Note, Note Completion      Last Updated: 11-Mar-2023 11:51 by Caesar Sarabia)

## 2024-03-06 NOTE — CONSULTS
Service:   Service: Perioperative Medicine     Consult:  Consult requested by (Attending Name): Paige Armendariz   Reason: Pre-op clearance; assistance with managing  co     History of Present Illness:   HPI:    When Lion Means is a 52 year old Male with history of multiple cervical spine surgeries, POTS, fibromyalgia, multiple orthopedic surgeries on right knee, and history  of gastric bypass surgery with subsequent complications.      Patient presented to the ED after mechanical fall in his shower.  He was told by Dr. Romeo to come to the ED for his acute on chronic dehiscence of his posterior cervical wound.  He states that he noticed drainage from the wound mid Jan that was initially  clear.  The drainage progressed to a brown fluid with a foul odor.  He also presents with acute on chronic edema of his right leg, with erythema and warmth.  The edema spans his right thigh to his foot.  He also has chronic lower extremity edema on the  left.     He was given empiric antibiotics in the ED.  The patient is planned for a combined neurosurgery/plastics complex closure.  Perioperative medicine was consulted for surgical clearance.    On my evaluation of the patient:     He states he has dealt with chronic infection.  He notes that at baseline he has edema of his lower extremities but feels that his right leg has worsened and is now red and painful after his fall.  Patient notes he has had 13 surgeries on his left knee,  including hardware placement, hardware explantation, multiple washouts. He also notes a chronic ulcer in the top 1/3 of his leg that he attributes to his knee orthotic that he has needed for the last 3 years.  He states the ulcer fluctuates between drainage  and scabbing.  He states that he has not been able to wear the orthotic of late due to increased edema and feels that the ulcer is improving.      Patient states after his prior cervical spine surgery he was in rehab for 10 days  receiving IV antibiotics.  He was transitioned to oral antibiotics after discharge, and has been taking cephalexin 4 times daily since November.  He states he lives alone  in a single level apartment.  He is able to do some light housework, however he does have some sob with exertion.  He ambulates with a cane or walker and has very restricted mobility.  Patient requires outside transportation to get places, he does have  a home health aide.      He states that about 1 year ago he was seen by a hepatologist at [facility], he was told he had stage 4 liver fibrosis, although he notes that he had some improvement of his fibrosis with improvement in diet and nutrition.  He has not had follow up since.     He receives nutrition orally and by PEG tube.  He does have dysphagia and notes that he often gets food stuck in his upper esophagus.     Review Family/Social History and ROS:   Social History:    Smoking Status: never smoker  (1)   Alcohol Use: denies (1)            Allergies:  ·  NKDA :   ·  Tape  - Adhesive, Bandaids, Paper: Itching    Objective:     Objective Information:    Alert oriented participates in conversation   Regular rate and rhythm, no JVD  Lungs clear to auscultation   Bilateral pitting edema 1+ R>L with extension of edema up to right thigh  R calf erythema and warmth, pain with palpation   Abdomen soft, PEG tube with large dressing in place  Muscle strength 4-5/5 globally     Recent Lab Results:    Results:        I have reviewed these laboratory results:    Complete Blood Count + Differential  06-Mar-2023 20:44:00      Result Value    White Blood Cell Count  8.0    Nucleated Erythrocyte Count  0.0    Red Blood Cell Count  3.81   L   HGB  8.1   L   HCT  26.0   L   MCV  68   L   MCHC  31.2   L   PLT  418    RDW-CV  17.3   H   Neutrophil %  72.6    Immature Granulocytes %  0.5    Lymphocyte %  15.8    Monocyte %  9.3    Eosinophil %  1.4    Basophil %  0.4    Neutrophil Count  5.78    Lymphocyte Count  1.26     Monocyte Count  0.74    Eosinophil Count  0.11    Basophil Count  0.03      Comprehensive Metabolic Panel  06-Mar-2023 20:44:00      Result Value    Glucose, Serum  114   H   NA  137    K  4.0    CL  105    Bicarbonate, Serum  26    Anion Gap, Serum  10    BUN  11    CREAT  0.55    GFR Male  >90    Calcium, Serum  8.5   L   ALB  3.1   L   ALKP  109    T Pro  6.9    T Bili  0.3    Alanine Aminotransferase, Serum  13    Aspartate Transaminase, Serum  18      Iron + TIBC, Serum  06-Mar-2023 20:44:00      Result Value    Iron, Serum  11   L   Total Iron Binding Capacity  264    % Saturation  4   L     C Reactive Protein, Serum  06-Mar-2023 20:44:00      Result Value    C Reactive Protein, Serum  8.54   A     Thyroid Stimulating Hormone, Serum  06-Mar-2023 20:44:00      Result Value    Thyroid Stimulating Hormone, Serum  0.79      Vitamin B12, Serum  06-Mar-2023 20:44:00      Result Value    Vitamin B12, Serum  900      Ferritin, Serum  06-Mar-2023 20:44:00      Result Value    Ferritin, Serum  65      Folate, Serum  06-Mar-2023 20:44:00      Result Value    Folate, Serum  >24.0      Brain Natriuretic Peptide  06-Mar-2023 20:44:00      Result Value    Brain Natriuretic Peptide  43          Assessment:    51yo male with history of multiple cervical spine surgeries, chronic infection, gastric bypass surgery presents for complex closure of posterior cervical wound.     Timing: Urgent/Time sensitive     Surgery - Joint Neurosurgical Plastic Surgery Complex closure of cervical spine wound    On my eval of patient: No evidence of ACS/CHF/fatal arrhythmia/valvular pathology  I reviewed the patient's recent EKG - findings of normal sinus rhythm  Patient had an ECHO in 2016 with mild decrease in LV function.  He has been seen by cardiology outpatient for clearance of multiple prior surgeries.  BNP on admission was normal.  Lower extremity edema likely noncardiac etiology.      RCRI risk score is 1/5 (surgical category), given  the surgical category is intermediate risk, the patient is at acceptable risk to proceed with surgery.   Based on DASI, Functional capacity is > 4METS.     Microcytic anemia with iron deficiency in setting of prior gastric bypass, patient has history of iron deficiency anemia - recommend IV iron infusion, Iron Dextran 300mg daily for 3 doses.    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     Recommend continued hepatic workup after surgery, consider liver ultrasound with portal vein doppler.    Discussed with Dr. Sellers.        Fernando Lemus DO   Anesthesiology PGY-2    Consult Status:  Consult Order ID: [id number]     Attestation:   Note Completion:  I am a:  Resident/Fellow   Attending Attestation I saw and evaluated the patient.  I personally obtained the key and critical portions of the history and physical exam or was physically present for key and  critical portions performed by the resident/fellow. I reviewed the resident/fellow?s documentation and discussed the patient with the resident/fellow.  I agree with the resident/fellow?s medical decision making as documented in the note.     I personally evaluated the patient on 07-Mar-2023         Electronic Signatures:  Fernando Lemus ( (Resident))  (Signed 07-Mar-2023 17:17)   Authored: Service, History of Present Illness, Review  Family/Social History and ROS, Allergies, Objective, Assessment/Recommendations, Note Completion  Jered Sellers)  (Signed 16-Mar-2023 18:49)   Authored: Note Completion   Co-Signer: Assessment/Recommendations, Note Completion      Last Updated: 16-Mar-2023 18:49 by Jered Sellers  "(MD)    References:  1.  Data Referenced From \"Consult-Infectious Disease\" 07-Mar-2023 13:29   "

## 2024-03-06 NOTE — CONSULTS
Service:   Service:  Service: Surgery     Consult:  Consult requested by (Attending Name): Dr. Joshua Diana   Reason: infected exposed cervical vertebra     History of Present Illness:   History Present Illness:  HPI:    The patient is a 52 year old male with a history of cervical myelopathy s/p C5-7 ACDF, C3-5 laminoplasty ([facility] 2020), s/p thoracic SCS, s/p lumbar fusion, fibromyalgia,  persistent cervical stenosis 9/9/22 s/p removal of laminoplasty hardware, C3-6 PCDF, c/b cervical osteomyelitis, 11/2/22 s/p C4 corpectomy, C2-T2 PCDF who now presents after a fall in his shower onto his right side. Patient reports a mechanical fall in  his shower yesterday where the bar broke. He has chronic knee wound from a brace and chronic lower extremity edema, but this is all significantly worse since the fall and his right knee pain is worse.   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 He has a home health team that changes the dressings, it has brownish fluid output and slight foul odor  lately. He denies fevers, chills, endorses generalized fatigue due to the leg edema.    Imaging includes- no new cervical spine imaging    PMHx: per above    PSHx: per above    Social Hx: lives at home    Allergies: NKDA    Medications: per above           Allergies:  ·  NKDA :   ·  Tape  - Adhesive, Bandaids, Paper: Itching    Objective:     Objective Information:        T   P  R  BP   MAP  SpO2   Value  36.9  85  16  123/71      99%  Date/Time 3/6 20:20 3/6 20:20 3/6 20:20 3/6 20:20    3/6 20:20  Range  (36.9C - 36.9C )  (85 - 85 )  (16 - 16 )  (123 - 123 )/ (71 - 71 )    (99% - 99% )  Highest temp of 36.9 C was recorded at 3/6 20:20    Physical Exam by System:    Constitutional: cachectic appearing   Eyes: normal sclera   ENMT: mmm   Head/Neck: neck with mostly well healed incision  but focal area of dehiscence with exposed  spinous process and brownish discharge   Extremities: bilateral lower extremity pitting edema  to the knees with distal erythema and tenderness   Neurological: alert, appropriate  RUE D4 B/T4+ HG/IO 4  LUE D4+ B/T 4+ HG/IO 4+  BLE 4+ diffusely due to edema       Assessment/Recommendations:  Assessment:    The patient is a 52 year old male with a history of cervical myelopathy s/p C5-7 ACDF, C3-5 laminoplasty ([facility] 2020), s/p thoracic SCS, s/p lumbar fusion, fibromyalgia,  persistent cervical stenosis 9/9/22 s/p removal of laminoplasty hardware, C3-6 PCDF, c/b cervical osteomyelitis, 11/2/22 s/p C4 corpectomy, C2-T2 PCDF who now presents after a fall in his shower onto his right side. Patient reports a mechanical fall in  his shower yesterday where the bar broke. He has chronic knee wound from a brace and chronic lower extremity edema, but this is all significantly worse since the fall and his right knee pain is worse.   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 He has a home health team that changes the dressings, it has brownish fluid output and slight foul odor  lately. He denies fevers, chills, endorses generalized fatigue due to the leg edema.    Recommendations:  would recommend medicine evaluation for lower extremity edema, erythema, tenderness  infectious workup given fatigue and lower extremity findings  his wound has chronic dehiscence but given increased drainage can obtain MRI c spine with and without contrast and CT c spine without contrast  surgical intervention for this would require joint case with plastics surgery, will try to coordinate this admission but may not be feasible  Please page with any questions or concerns.     Consult Status:  Consult Order ID: [id number]     Attestation:   Note Completion:  I am a:  Resident/Fellow   Attending Attestation I reviewed the resident/fellow?s  documentation and discussed the patient with the resident/fellow.  I agree with the resident/fellow?s medical  decision making as documented in the note.          Electronic Signatures:  Joya Hall (Resident))  (Signed 06-Mar-2023 22:59)   Authored: Service, History of Present Illness, Allergies,  Objective, Assessment/Recommendations, Note Completion  Lorenzo Romeo)  (Signed 07-Mar-2023 03:13)   Authored: Note Completion   Co-Signer: History of Present Illness, Objective, Assessment/Recommendations, Note Completion      Last Updated: 07-Mar-2023 03:13 by Lorenzo Romeo)

## 2024-03-06 NOTE — CONSULTS
Service:   Service: Plastic Surgery     History of Present Illness:   HPI:    MANUEL GARCIA is a 52 year old Male with PMH of asthma, anxiety, depression, hypothyroidism, hx gastric bypass c/b gastric ulcer requiring gastrostomy tube placement,  R femur fracture s/p repair c/b hardware infection and multiple revisions, thoracic SCS, fibromyalgia, hx of lumbar fusion and cervical myelopathy and stenosis s/p multiple spinal surgeries: C5-7 ACDF, C3-5 laminoplasty ([facility] 2020) with persistent cervical  stenosis s/p removal of laminoplasty hardware, C3-6 PCDF (9/9/22 per NSGY, Dr. Romeo) c/b cervical osteomyelitis s/p C4 corpectomy, C2-T2 PCDF (11/2/22 per NSGY, Dr. Romeo). Presented to [facility] ED on 3/6/23 for evaluation of cervical spine wound  dehiscence/drainage and RLE discomfort and swelling following a mechanical fall at home. Patient describes being discharged home from a nursing home in 11/2022 following rehabilitation following cervical spinal surgeries on chronic PO Keflex per ID given  his hx of cervical spinal osteomyelitis. In mid January 2023, he began to appreciate spinal wound dehiscence with clear serous drainage which gradually progressed over time with exposure of the underlying cervical spinous process. Within the past 5 days,  patient began to notice an increased amount of odorous, brown drainage from his cervical spinal incision site along with intermittent fevers (Tmax 102.0) and malaise. Advised per NSGY office to present ED but initially had transportation issue limiting  earlier presentation. Subsequently experienced a mechanical fall onto his R side after he lost balance in his shower on 3/5 during which initiated his presentation to the ED. Workup upon presentation notable for elevations in ESR and CRP to 101 and 8.54  respectively. CT cervical spine suboptimal but does reveal extension of the C7 spinous process beyond the level of the surrounding skin with soft tissue thickening,  but without discernable fluid collection. Workup with XR imaging and duplex US additionally  obtained for patient's RLE swelling and pain which was essentially unremarkable for acute etiology. Patient initiated on empiric antibiotics (IV Vanc/Zosyn) and admitted to the NSGY service pending remaining workup and consults. Presently, patient denies  any significant pain. The skin surrounding his wound is relatively insensate at baseline per patient. Has chronic RUE decreased active ROM and strength which he also states is chronic. Otherwise denies headache, dizziness, chest pain, dyspnea, abdominal  pain, nausea or vomiting. Plastic Surgery service consulted per NSGY for evaluation of patient's cervical spinal wound with consideration for assistance with soft tissue coverage and definitive wound closure.        PMH: Asthma, anxiety, depression, hypothyroidism, hx gastric bypass c/b gastric ulcer requiring gastrostomy tube placement, R femur fracture  s/p repair c/b hardware infection and multiple revisions, thoracic SCS, fibromyalgia, hx of lumbar fusion and cervical myelopathy and stenosis s/p multiple spinal surgeries (as detailed per HPI).   PSH: Multiple lumbar and cervical spinal surgeries, gastric bypass, gastrostomy tube placement, multiple RLE femur operations with revisions,  R total knee replacement, L knee arthroscopy, cornea transplant x3, skin lesion excision.   Family hx: Reviewed, not pertinent to consulted concern.   Social hx: Denies EtOH, tobacco or illicit drug use.   Allergies: NKDA.   Medications: Reviewed in EMR. Denies AC/AP use.       Review Family/Social History and ROS:     Review Family/Social History and ROS:    I have reviewed the family and social history and review of systems from the History and Physical.    Social History     Smoking Status: never smoker  (1)   Alcohol Use: denies (1)     Constitutional: POSITIVE: Fever, Chills, Malaise     Eyes: NEGATIVE: Vision Loss/ Change      Respiratory: NEGATIVE: Dry Cough, Productive Cough,  Shortness of Breath     Cardiac: NEGATIVE: Chest Pain, Dyspnea on Exertion,  Palpitations, Syncope     Gastrointestinal: NEGATIVE: Nausea, Vomiting, Abdominal  Pain     Musculoskeletal: POSITIVE: Decreased ROM, Pain, Swelling ; COMMENTS: Chronic RUE decreased active ROM, pain upon palpation of midline spine, RLE edema and discomfort s/p mechanical fall     Neurological: NEGATIVE: Dizziness, Headache     Psychiatric: POSITIVE: Anxiety     Skin: NEGATIVE: Rash;  COMMENTS: Cervical spinal wound dehiscence and drainage     Endocrine: NEGATIVE: Sweat     Allergic/Immunologic: NEGATIVE: Itching              Allergies:  ·  NKDA :   ·  Tape  - Adhesive, Bandaids, Paper: Itching    Objective:     Objective Information:        T   P  R  BP   MAP  SpO2   Value  36.3  67  18  104/64      97%  Date/Time 3/7 8:12 3/7 8:12 3/7 8:12 3/7 8:12    3/7 8:12  Range  (36.3C - 36.9C )  (67 - 85 )  (16 - 20 )  (104 - 129 )/ (57 - 74 )    (96% - 99% )    Highest temp of 36.9 C was recorded at 3/6 20:20    Pain reported at 3/7 8:12: 8 = Severe    Physical Exam by System     Constitutional: Awake/alert/oriented x3, no distress,  cooperative, found resting supine in bed.   Eyes: EOMI, clear sclera.   ENMT: MMM. Hearing grossly intact b/l.   Head/Neck: AT/NC. Neck supple, trachea midline.   Respiratory/Thorax: Breathing comfortably on RA with  good chest expansion, thorax symmetric.   Cardiovascular: Regular rate and rhythm, 2+ equal  pulses of the extremities.   Gastrointestinal: Nondistended, soft, non-tender.  +Gastrostomy tube.   Genitourinary: Voiding independently.   Musculoskeletal: Chronic decreased cervical spinal  ROM, hx of prior cervical spinal fusion. Prior cervical spinal incision noted along midline posterior aspect of cervical spine approximately 3cm x 2cm x 1-2mm region of dehiscence appreciated at middle aspect of incisional line with ~1cm projection of  underlying  cervical spinous process. Tissue appears light pink and fibrinous. No surrounding/periwound erythema, edema or fluctuance to palpation. Minimal tenderness to palpation. No expressible purulence, drainage or bleeding. RUE chronic decrease in  active ROM, strength. RLE edema and discomfort with active ROM. Chronic active decrease ROM and strength to RLE per patient. +SILT to b/l upper and lower extremities. Strength b/l upper extremities 4/5 against resistance.   Extremities: Lower extremity edema (R>L).   Neurological: Alert and oriented x3. No gross neuro  deficits.   Psychological: Appropriate mood and behavior.   Skin: Warm and dry, no rashes. See musculoskeletal  exam.     Medications     Medications:        Continuous Medications       --------------------------------  1. Lactated Ringers Infusion:  1000  mL  IntraVenous  <Continuous>         Scheduled Medications       --------------------------------  1. Albuterol 2.5 mg/ 3 mL Nebulizer Soln:  1.5  mL  Inhalation  Every 4 Hours    2. Ascorbic Acid:  500  mg  Oral  2 Times a Day    3. Baclofen:  20  mg  Oral  4 Times a Day    4. Docusate:  100  mg  Oral  2 Times a Day    5. Formoterol 20 microgram/ 2 mL Neb Soln:  2  mL  Inhalation  Every 12 Hours    6. Gadoterate Meglumine (Dotarem-Radiology Contrast):  16.6  mL  IntraVenous Push  Once    7. lamoTRIgine (LAMICTAL):  400  mg  Oral  Daily    8. Levothyroxine:  100  microgram(s)  Oral  Daily    9. Lithium Carbonate Extended Release:  600  mg  Oral  Every 12 Hours    10. Multivitamin with Minerals:  1  tablet(s)  Oral  Daily    11. Pantoprazole:  40  mg  Oral  Daily    12. Pregabalin:  150  mg  Oral  3 Times a Day    13. Pyridostigmine:  60  mg  Oral  3 Times a Day    14. QUEtiapine:  800  mg  Oral  Daily    15. risperiDONE (RISPERDAL):  0.5  mg  Oral  2 Times a Day    16. Sennosides:  2  tablet(s)  Oral  Daily    17. traZODone:  150  mg  Oral  At Bedtime    18. Venlafaxine Extended Release:  225  mg  Oral  Daily          PRN Medications       --------------------------------  1. Acetaminophen:  650  mg  Oral  Every 6 Hours    2. Bisacodyl Rectal:  10  mg  Rectal  Daily    3. Cyclobenzaprine:  10  mg  Oral  3 Times a Day    4. Fleet Adult (Sodium Phosphate) Rectal:  1  enema  Rectal  Daily    5. hydrOXYzine Hydrochloride (ATARAX):  25  mg  Oral  Every 6 Hours    6. Ondansetron Injectable:  4  mg  IntraVenous Push  Every 6 Hours    7. oxyCODONE Immediate Release:  5  mg  Oral  Every 4 Hours    8. Polyethylene Glycol:  17  gram(s)  Oral  Daily      Recent Lab Results     Results:    I have reviewed these laboratory results:  - CBC without significant leukocytosis. Anemia of uncertain chronicity with HGB/HCT of 8.1/26.0 respectively (no recent values for comparison).    - CMP with hypocalcemia to 8.5 and hypoalbuminemia to 3.1, otherwise unremarkable.   - Inflammatory markers (ESR/CRP) elevated to 101 and 8.54 respectively.     Coronavirus 2019, Screen Asymptomatic  06-Mar-2023 22:54:00      Result Value    Fluid Source  Nasal, Nasopharyngeal    Coronavirus 2019,PCR  NOT DETECTED  Reference Range: Not Detected .This test has received FDA Emergency Use Authorization (EUA) and has been verified by [facility] ([facility]). This test is only authorized for the duration of time lori      Coagulation Screen  06-Mar-2023 21:35:00      Result Value    Prothrombin Time, Plasma  15.8   H   International Normalized Ratio, Plasma  1.4   H   Activated Partial Thromboplastin Time  45   H     Complete Blood Count + Differential  06-Mar-2023 20:44:00      Result Value    White Blood Cell Count  8.0    Nucleated Erythrocyte Count  0.0    Red Blood Cell Count  3.81   L   HGB  8.1   L   HCT  26.0   L   MCV  68   L   MCHC  31.2   L   PLT  418    RDW-CV  17.3   H   Neutrophil %  72.6    Immature Granulocytes %  0.5    Lymphocyte %  15.8    Monocyte %  9.3    Eosinophil %  1.4    Basophil %  0.4    Neutrophil Count  5.78     Lymphocyte Count  1.26    Monocyte Count  0.74    Eosinophil Count  0.11    Basophil Count  0.03      Comprehensive Metabolic Panel  06-Mar-2023 20:44:00      Result Value    Glucose, Serum  114   H   NA  137    K  4.0    CL  105    Bicarbonate, Serum  26    Anion Gap, Serum  10    BUN  11    CREAT  0.55    GFR Male  >90    Calcium, Serum  8.5   L   ALB  3.1   L   ALKP  109    T Pro  6.9    T Bili  0.3    Alanine Aminotransferase, Serum  13    Aspartate Transaminase, Serum  18      C Reactive Protein, Serum  06-Mar-2023 20:44:00      Result Value    C Reactive Protein, Serum  8.54   A     Sedimentation Rate, Erythrocyte  06-Mar-2023 20:44:00      Result Value    Sedimentation Rate, Erythrocyte  101   H       Radiology Results     Results:    I have reviewed the imaging studies below and agree with the findings noted in the radiology report.     Impression:  1. Suboptimal exam.  2. Anterior and posterior cervical spine surgical change. C7 spinous  process extends beyond the skin with surrounding soft tissue  thickening. No discernible collection.     CT C Spine without Contrast [Mar  7 2023  4:19AM]      Assessment:    MANUEL GARCIA is a 52 year old Male with PMH of lumbar fusion and cervical myelopathy and stenosis s/p multiple spinal surgeries: C5-7 ACDF, C3-5 laminoplasty ([facility]  2020) with persistent cervical stenosis s/p removal of laminoplasty hardware, C3-6 PCDF (9/9/22 per NSGY, Dr. Romeo) c/b cervical osteomyelitis s/p C4 corpectomy, C2-T2 PCDF (11/2/22 per NSDr. Ousmane LIU). Presented to [facility] ED on 3/6/23 for evaluation  of cervical spine wound dehiscence/drainage and RLE discomfort and swelling following a mechanical fall at home. First noted cervical spinal wound dehiscence and clear serous drainage in mid January 2023 which progressed to odorous, brown drainage 5 days  prior to presentation with intermittent fevers and malaise. Workup upon presentation notable for elevations in ESR and CRP to  101 and 8.54 respectively. CT cervical spine suboptimal but does reveal extension of the C7 spinous process beyond the level  of the surrounding skin with soft tissue thickening, but without discernable fluid collection. Workup with XR imaging and duplex US additionally obtained for patient's RLE swelling and pain which was essentially unremarkable for acute etiology. Patient  initiated on empiric antibiotics (IV Vanc/Zosyn) and admitted to the NSGY service pending remaining workup and consults. Plastic Surgery service consulted per NSGY for evaluation of patient's cervical spinal wound with consideration for assistance with  soft tissue coverage and definitive wound closure.       Plan/Recommendations:   - Plastics available for intraop assistance with spinal wound closure (likely via paraspinal vs. rotational     muscle flap) during anticipated debridement per NSGY as add on Thursday 3/9  - Appreciate perioperative medicine clearance for OR   - Continue interim daily local wound care (nursing orders placed)      · Cleanse wound with saline then cover with Mepilex border dressing (change daily and PRN if soiled)  - Continue nutrition optimization with Vit C and daily MV      · Consider addition of Ensure nutritional shakes TID   - ABX per ID recs/primary   - Appreciate remaining supportive care per primary service   - Plastic Surgery service will continue to follow pending surgical intervention/involvement     Patient evaluated and plan discussed with Dr. Perez.     Ivis Alves PA-C  Plastic and Reconstructive Surgery   Doc [facility]  Pager #[phone]  Team phones: [phone]      Time spent on the assessment of patient, gathering and interpreting data, review of medical record/patient history, personally reviewing radiographic imaging and formulation of this note 60 minutes. With greater than 50% spent in personal discussion with  patient.     Plan of Care Reviewed With   Plan of Care Reviewed With:  "patient     Electronic Signatures for Addendum Section:   Caesar Sarabia) (Signed Addendum 09-Mar-2023 12:34)   I met with Lion after I reviewed his past operative notes, and scans,  and I discussed with him the complexity of his wound and treatment options including  no surgical intervention and continuation of wound care. However, the only way forward for this wound is excisional debridement and local tissue coverage with pedicle fasciocutaneous flap or musculocutaneous flap. Risks including infection, bleeding and  hematoma, seroma, partial or total necrosis of the flap, wound healing issues, and relapse of the wound, functional deficit at the right shoulder, need for additional surgeries were all discussed.   Furthermore, I discussed the site I will be using as a donor for the flap needed for wound closure. Given that he already has weak right shoulder, I felt that using tissue from the territory of the right trapezius will be better compared with the left  side which is used as n assisting side.   Patient agreed, and his questions were all answered to the best of my knowledge.   We will coordinate with neurosurgery to perform debridement and coverage at the same time.   I have discussed my plan with my KEVIN, and I reviewed their notes.     Electronic Signatures:  Ivis Alves (PAC)  (Signed 07-Mar-2023 16:56)   Authored: Service, History of Present Illness, Review  Family/Social History and ROS, Allergies, Objective, Assessment/Recommendations, Note Completion      Last Updated: 09-Mar-2023 12:34 by Caesar Sarabia)    References:  1.  Data Referenced From \"Patient Profile - Adult v2\" 07-Mar-2023 05:39   "

## 2024-03-06 NOTE — CONSULTS
Service:   Service: Wound Care     Consult:  Consult requested by (Attending Name): Paige Armendariz   Reason: asistance with bilateral lower extremity  edema, some open areas     History of Present Illness:   HPI:    MANUEL GARCIA is a 52 year old Male    Review Family/Social History and ROS:   Social History:    Smoking Status: never smoker  (1)   Alcohol Use: denies (1)            Allergies:  ·  NKDA :   ·  Tape  - Adhesive, Bandaids, Paper: Itching      Assessment:    Wound location: R anterior shin   size: 1.5 x 1 x  0.5 cm                            undermining: none visible      tracking: none visible                                        Wound type: possible infectious vs chronic surgical wound complicated by pressure from knee brace  Wound bed: yellow dry base, full thickness skin loss  Draining: small amount of pus drainage  Periwound skin: leg edematous (calf circumference measured 41 cm), wound is at distal end of scar tissue from previous incision from knee surgery  Therapeutic surface: [facility]    Recommendation: Irrigate with normal saline or wound cleanser.  Apply hydrofera blue ready (provided by wound care-package of product left at bedside) and cover with mepilex border dressing.  Change every other day and as needed.  Single layer of Size  E tubigrip applied.  May remove daily to clean and lotion legs.      Patient states that he had surgery for knee over three years ago and has had swelling in this leg and inability to bend knee.  He has not returned to his surgeon due to this surgeon retiring.  Recommend patient be referred for follow up with orthopedic  surgery at least outpatient for knee and possible wound related to chronic brace use.  Recommend patient have outpatient wound care due to chronic swelling and chronic wound.      Please review recommendation. If you agree with this recommendation, please enter as a wound orders in EMR. Thank you.    Plan:  Wound care to  "follow up with patient Friday.  While inpatient, call with questions or if condition changes.    Paulina Garcia, MSN, RN, CWCN, COCN  Pager [phone]  [facility]             Consult Status:  Consult Order ID: [id number]       Electronic Signatures:  Paulina Garcia (HARVEY)  (Signed 08-Mar-2023 16:05)   Authored: Service, History of Present Illness, Review  Family/Social History and ROS, Allergies, Assessment/Recommendations, Note Completion      Last Updated: 08-Mar-2023 16:05 by Paulina Garcia (HARVEY)    References:  1.  Data Referenced From \"Consult-Perioperative Medicine\" 07-Mar-2023 16:37   "

## 2024-03-06 NOTE — CONSULTS
Service:   Service: Wound Care     Consult:  Reason: RLE wound     History of Present Illness:   HPI:    MANUEL GARCIA is a 52 year old Male           Allergies:  ·  No Known Allergies :       Assessment:    Wound location: R anterior shin   size: 1.5 x 1 x  0.5 cm (wound size unchanged)                           undermining: none visible      tracking: none visible                                        Wound type: possible infectious vs chronic surgical wound complicated by pressure from knee brace  Wound bed: red dry base, appears   Draining: small amount of pus drainage  Periwound skin: leg edematous wound is at distal end of scar tissue from previous incision from knee surgery  Therapeutic surface: [facility]ac[facility]    Recommendation: Irrigate with normal saline or wound cleanser.  Apply hydrofera blue ready (provided by wound care-package of product left at bedside) and cover with mepilex border dressing.  Change every other day and as needed.  Single layer of Size  E tubigrip applied.  May remove daily to clean and lotion legs.      Patient states that he had surgery for knee over three years ago and has had swelling in this leg and inability to bend knee.  He has not returned to his surgeon due to this surgeon retiring.   Recommend patient be referred for follow up with orthopedic surgery at least outpatient for knee and possible wound related to chronic brace use.  Recommend patient have outpatient wound care due to chronic swelling and chronic wound.      Please review recommendation. If you agree with this recommendation, please enter as a wound orders in EMR. Thank you.    Plan:  Wound care to follow up with patient Friday.  While inpatient, call with questions or if condition changes.    Paulina Garcia, MSN, RN, CWCN, COCN  Pager [phone]  [facility]        Electronic Signatures:  Paulina Garcia (HARVEY)  (Signed 14-Mar-2023 15:33)   Authored: Service, History of Present Illness, Allergies,   Assessment/Recommendations, Note Completion      Last Updated: 14-Mar-2023 15:33 by Paulina Garcia (HARVEY)

## 2024-03-06 NOTE — CONSULTS
Service:   Service: Orthopaedics     Consult:  Consult requested by (Attending Name): Lorenzo Romeo   Reason: knee pain s/p arthroplasty, unable to walk,  xr with patellar subluxation     History of Present Illness:   HPI:    52M h/o R TKA s/p 2-stage revision for PJI, and multiple cervical spine surgeries currently admitted to [facility] [place] for cervical  wound breakdown with exposed spinous process. Ortho consulted for painful R total knee arthroplasty. Pt endorses acute on chronic R  knee pain after a mGLF on 3/5. Pt normally ambulates with a rollator and hinged knee brace locked in extension, now he has more difficulty ambulating and says his HKB does not fit due to swelling of the knee. XRs are negative for fracture, w/ possible  loosening around the components.   Denies N/T, or new weakness.     Orthopaedic Problems/Injuries: painful R total knee  Other Injuries: per hPO    PMH: per HPI  PSH: per HPI  SocHx: denies EtOH, tobacco, drugs  FamHx:  Non-contributory to this patient's acute orthopaedic problem.   All: NKDA  Meds: per EMR    ROS      >Gen: Denies recent weight loss      >Neuro: Denies recent confusion      >Ophtho: Denies changes in vision      >ENT: Denies changes in hearing      >CV: Denies chest pain      >Resp: Denies shortness of breath      >GI: Denies melena/hematochezia      >: Denies painful urination      >MSK: Per above HPI      >Heme: No abnormal bleeding      >Psych: Denies hallucinations.           Allergies:  ·  No Known Allergies :     Objective:     Objective Information:        T   P  R  BP   MAP  SpO2   Value  36.7  69  18  114/69      97%  Date/Time 3/15 16:49 3/15 16:49 3/15 16:49 3/15 16:49    3/15 16:49  Range  (36.5C - 37.2C )  (66 - 88 )  (18 - 20 )  (108 - 128 )/ (60 - 74 )    (95% - 100% )  Highest temp of 37.2 C was recorded at 3/14 5:00    Physical Exam Narrative:  ·  Physical Exam:    General: awake, alert, not in acute distress  Skin: no rashes or lesions  appreciated  Head: atraumatic, normocephalic  Resp: good chest rise with symmetric thoracic expansion, breathing comfortably on room air  CV: extremities warm and well perfused, brisk capillary refill  GI: abdomen soft, non-tender, non-distended  Neuro: alert and oriented x3, SILT grossly intact throughout  Psych: mood and affect appropriate  MSK:  R Lower Extremity:  - Appearance: significant swelling of the knee, no erythema, warmth, induration, or sinus tracts  - diffusely TTP at the knee  - ROM 10 to 45 degrees  - + dorsiflexion, plantarflexion, EHL  - SILT grossly intact   - Extremity warm and well perfused  - Compartments soft and compressible.    Radiology Results:    Results:        Impression:    1.  Total right knee arthroplasty with no radiographic evidence of  hardware failure.  2. Patella baja deformity noted.  3. Mild soft tissue edema.      Xray Knee Complete 4 or more View [Mar 15 2023 12:30PM]        Assessment:    52M with R TKA s/p 2-stage revision for PJI performed at OSH, now with acute on chronic R knee pain/swelling after mGLF on 3/5.     Recommendations:  - No acute orthopaedic interventions  - Weight bearing status: WBAT in knee immobilizer (given to pt)  - F/U with index surgeon or  Adult Reconstruction service after discharge. Call [phone] to schedule appointment.  - Please don't hesitate to page with questions.    This was discussed with on-call orthopaedic attending Dr. Huertas.    Zack Torrez MD JESSY   PGY3  Orthopaedic Surgery    p. [phone] ([employer] preferred).    Consult Status:  Consult Order ID: [id number]     Attestation:   Note Completion:  I am a:  Resident/Fellow   Attending Attestation I reviewed the resident/fellow?s documentation and discussed the patient with the resident/fellow.  I agree with the resident/fellow?s medical  decision making as documented in the note.          Electronic Signatures:  Zack Torrez (Resident))  (Signed 15-Mar-2023  23:13)   Authored: Service, History of Present Illness, Allergies,  Objective, Assessment/Recommendations, Note Completion  Roger Huertas)  (Signed 20-Mar-2023 21:00)   Authored: Note Completion   Co-Signer: Service, History of Present Illness, Allergies, Objective, Assessment/Recommendations, Note Completion      Last Updated: 20-Mar-2023 21:00 by Roger Huertas)

## 2024-03-06 NOTE — CONSULTS
Service:   Service: Infectious Disease     Consult:  Consult requested by (Attending Name): Paige Armendariz   Reason: cervical wound, LE wound     History of Present Illness:   HPI:    MANUEL GARCIA is a 52 year old Male, with a past medical history of asthma, multiple cervical spine surgeries with hardware complicated by osteomyelitis in the  past, who I was asked to see for open cervical wound and drainage. The patient had a surgery beginning of November and is now having bony spinous process exposure and drainage. The drainage is muddy brown and foul-smelling. He also fell on right knee  1day prior to presentation. Patient reports that the wound has been draining clearer fluid for about a month and a half  along with exposure of underlying bone. He contacted neurosurgeon which told him it wasn't to worry about. 5days prior to presentation  the drainage became brown and foul smelling. He was on cephalexin (500mg, 4 times daily) at home since discharge from previous hospitalization of osteomyelitis in November. After presentation, he received 2 doses of 1g Vancomycin and 1 dose of Zosyn and  then antibiotics were held and we were consulted. Patient was seen and examined. Discharge from wound site is still mildly brown,  but no edema, no redness, non-tender. Bone is seen at site of wound. He also has a lower extremity ulcer that has improved  and looks healing, nondraining, nonerythematous, nontender. He also has a feeding tube in place, no signs of inflammation at site.     Review Family/Social History and ROS:     Review Family/Social History and ROS:       I have reviewed the family and social history and review of systems from the History and Physical.    Social History:    Smoking Status: never smoker  (1)   Alcohol Use: denies (1)     Constitutional: NEGATIVE: Fever, Chills, Anorexia,  Weight Loss, Malaise     Eyes: NEGATIVE: Blurry Vision, Drainage, Diploplia,  Redness, Vision Loss/ Change      ENMT: NEGATIVE: Nasal Discharge, Nasal Congestion,  Ear Pain, Mouth Pain, Throat Pain     Respiratory: NEGATIVE: Dry Cough, Productive Cough,  Hemoptysis, Wheezing, Shortness of Breath     Cardiac: NEGATIVE: Chest Pain, Dyspnea on Exertion,  Orthopnea, Palpitations, Syncope     Gastrointestinal: NEGATIVE: Nausea, Vomiting, Diarrhea,  Constipation, Abdominal Pain     Genitourinary: NEGATIVE: Discharge, Dysuria, Flank  Pain, Frequency, Hematuria     Musculoskeletal: NEGATIVE: Decreased ROM, Pain, Swelling,  Stiffness, Weakness     Neurological: NEGATIVE: Dizziness, Confusion, Headache,  Seizures, Syncope     Psychiatric: NEGATIVE: Mood Changes, Anxiety, Hallucinations,  Sleep Changes, Suicidal Ideas     Skin: NEGATIVE: Mass, Pain, Pruritus, Rash, Ulcer     Endocrine: NEGATIVE: Heat Intolerance, Cold Intolerance,  Sweat, Polyuria, Thirst     Hematologic/Lymph: NEGATIVE: Anemia, Bruising, Easy  Bleeding, Night Sweats, Petechiae     Allergic/Immunologic: NEGATIVE: Anaphylaxis, Itchy/  Teary Eyes, Itching, Sneezing, Swelling              Allergies:  ·  NKDA :   ·  Tape  - Adhesive, Bandaids, Paper: Itching    Objective:     Objective Information:        T   P  R  BP   MAP  SpO2   Value  36.1  73  18  115/66      99%  Date/Time 3/7 11:35 3/7 11:35 3/7 11:35 3/7 11:35    3/7 11:35  Range  (36.1C - 36.9C )  (67 - 85 )  (16 - 20 )  (104 - 129 )/ (57 - 74 )    (96% - 99% )  Highest temp of 36.9 C was recorded at 3/6 20:20      Pain reported at 3/7 8:12: 8 = Severe    Physical Exam Narrative:  ·  Physical Exam:    Wound site: nontender, pale looking, bone seen at site, mild drainage of brown fluid.    Physical Exam by System:    Constitutional: Well developed, awake/alert/oriented  x3, no distress, alert and cooperative   Eyes: PERRL, EOMI, clear sclera   ENMT: mucous membranes moist, no apparent injury,  no lesions seen   Head/Neck: Neck supple, no apparent injury, thyroid  without mass or tenderness, No JVD, trachea  midline, no bruits. Ulcer on posterior neck (cervical region) that has bony erosion and brown fluid drainage, not erythematous, warm or tender.   Respiratory/Thorax: Patent airways, CTAB, normal  breath sounds with good chest expansion, thorax symmetric   Cardiovascular: Regular, rate and rhythm, no murmurs,  2+ equal pulses of the extremities, normal S 1and S 2   Gastrointestinal: Nondistended, soft, non-tender,  no rebound tenderness or guarding, no masses palpable, no organomegaly, +BS, no bruits   Genitourinary: No Discharge, vesicles or other abnormalities   Musculoskeletal: ROM intact, no joint swelling, normal  strength   Extremities: ulcer at Lower right extremity, looks  healing, no drainage   Neurological: some numbness at neck area, otherwise  normal     Medications:    Medications:          Continuous Medications       --------------------------------    1. Lactated Ringers Infusion:  1000  mL  IntraVenous  <Continuous>         Scheduled Medications       --------------------------------    1. Albuterol 2.5 mg/ 3 mL Nebulizer Soln:  1.5  mL  Inhalation  Every 4 Hours    2. Ascorbic Acid:  500  mg  Oral  2 Times a Day    3. Baclofen:  20  mg  Oral  4 Times a Day    4. Docusate:  100  mg  Oral  2 Times a Day    5. Formoterol 20 microgram/ 2 mL Neb Soln:  2  mL  Inhalation  Every 12 Hours    6. Gadoterate Meglumine (Dotarem-Radiology Contrast):  16.6  mL  IntraVenous Push  Once    7. lamoTRIgine (LAMICTAL):  400  mg  Oral  Daily    8. Levothyroxine:  100  microgram(s)  Oral  Daily    9. Lithium Carbonate Extended Release:  600  mg  Oral  Every 12 Hours    10. Multivitamin with Minerals:  1  tablet(s)  Oral  Daily    11. Pantoprazole:  40  mg  Oral  Daily    12. Pregabalin:  150  mg  Oral  3 Times a Day    13. Pyridostigmine:  60  mg  Oral  3 Times a Day    14. QUEtiapine:  800  mg  Oral  Daily    15. risperiDONE (RISPERDAL):  0.5  mg  Oral  2 Times a Day    16. Sennosides:  2  tablet(s)  Oral  Daily    17.  traZODone:  150  mg  Oral  At Bedtime    18. Venlafaxine Extended Release:  225  mg  Oral  Daily         PRN Medications       --------------------------------    1. Acetaminophen:  650  mg  Oral  Every 6 Hours    2. Bisacodyl Rectal:  10  mg  Rectal  Daily    3. Cyclobenzaprine:  10  mg  Oral  3 Times a Day    4. Fleet Adult (Sodium Phosphate) Rectal:  1  enema  Rectal  Daily    5. hydrOXYzine Hydrochloride (ATARAX):  25  mg  Oral  Every 6 Hours    6. Ondansetron Injectable:  4  mg  IntraVenous Push  Every 6 Hours    7. oxyCODONE Immediate Release:  5  mg  Oral  Every 4 Hours    8. Polyethylene Glycol:  17  gram(s)  Oral  Daily        Recent Lab Results:    Results:        I have reviewed these laboratory results: Complete Blood Count + Differential [Drawn 06-Mar-2023 20:44:00], C Reactive Protein, Serum [Drawn 06-Mar-2023 20:44:00], Venous Full Panel [Drawn 06-Mar-2023 20:41:00].      I have reviewed these laboratory results: C Reactive Protein, Serum [Drawn 06-Mar-2023 20:44:00].    Radiology Results:    Results:      CT C Spine without Contrast [Mar  7 2023  4:19AM]  Xray Femur Min 2 Views [Mar  6 2023 11:10PM]  Xray Knee 3 View [Mar  6 2023 11:10PM]  Xray Tibia + Fibula 2 View [Mar  6 2023 11:10PM]        Assessment:    Assessment  This is a case of 52 year old male with PMH of asthma and multiple cervical spine surgeries complicated by osteomyelitis. He presented for an open wound infection that's draining brown foul-smelling fluid.   Cervical spine wound infection:   Patient is afebrile, no chills, nausea or vomiting, denied any urinary symptoms. Site of infection looks clean with decrease in the drainage amount. Nontender, nonerythematous, nonedematous.   He received 2 doses Vanc, 1 dose Zosyn. He was on Cephalexin at home since November. Infection looks like its controlled, no signs of inflammation, no fever or chills.     Plan:     1. Please order CRP levels once weekly  2. If patient went to OR, we would  like cultures from site of infection  3. Hold antibiotics for now, we will watch for signs and symptoms  4. If patient deteriorated or decompensates or OR was delayed, we will consider antibiotics Vancomycin (goal trough 15-20) and Ceftriaxone 2g daily    Recommendations not final until cosigned by staff.   If further questions are needed, please contact us. (Pager [phone])    Plan of Care Reviewed With:  Plan of Care Reviewed With: patient     Consult Status:  Consult Status    (select all that apply): initial  consult complete, will follow   Consult Order ID: [id number]     Attestation:   Note Completion:  I am a:  Medical Student/Acting Intern   Medical Student Attestation I, or a resident under my supervision, was present with the medical student who participated in the documentation of this note.  I have personally seen and examined the patient and performed the medical decision-making components. I have reviewed the medical student documentation and/or resident documentation and verified the findings in the note as written with additions or exceptions  as stated in the body of this note.    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  At that time was treated with IV antibiotics while inpatient only and was discharged on oral cephalexin. Suspect infection was undertreated and thus broke through oral cephalexin coverage. Expect MSSA to be the pathogen, though should send intraop cultures  to ensure treatment failure was not due to an alternative pathogen being present. Patient likely to require indefinite oral antibiotic suppression. Not treating with rifampin due to significant drug interactions.           Electronic Signatures:  Jelena Hull)  (Signed 08-Mar-2023 17:17)   Authored: Note Completion   Co-Signer: Service,  "History of Present Illness, Review Family/Social History and ROS, Allergies, Objective, Assessment/Recommendations,  Note Completion  Saadia Willams (MED STUD)  (Signed 07-Mar-2023 14:10)   Authored: Service, History of Present Illness, Review  Family/Social History and ROS, Allergies, Objective, Assessment/Recommendations, Note Completion      Last Updated: 08-Mar-2023 17:17 by Jelena Hull)    References:  1.  Data Referenced From \"Consult-Plastic Surgery\" 07-Mar-2023 09:59   "

## 2024-05-24 ENCOUNTER — TELEPHONE (OUTPATIENT)
Dept: NEUROSURGERY | Facility: CLINIC | Age: 54
End: 2024-05-24

## 2024-10-22 ENCOUNTER — APPOINTMENT (OUTPATIENT)
Facility: CLINIC | Age: 54
End: 2024-10-22
Payer: COMMERCIAL

## 2025-02-17 ENCOUNTER — APPOINTMENT (OUTPATIENT)
Dept: PLASTIC SURGERY | Facility: CLINIC | Age: 55
End: 2025-02-17
Payer: COMMERCIAL

## 2025-03-03 ENCOUNTER — APPOINTMENT (OUTPATIENT)
Dept: PLASTIC SURGERY | Facility: CLINIC | Age: 55
End: 2025-03-03
Payer: COMMERCIAL

## 2025-04-01 ENCOUNTER — APPOINTMENT (OUTPATIENT)
Dept: NEUROSURGERY | Facility: CLINIC | Age: 55
End: 2025-04-01
Payer: COMMERCIAL

## 2025-04-01 VITALS
HEIGHT: 74 IN | DIASTOLIC BLOOD PRESSURE: 90 MMHG | SYSTOLIC BLOOD PRESSURE: 138 MMHG | WEIGHT: 260.3 LBS | BODY MASS INDEX: 33.41 KG/M2 | HEART RATE: 84 BPM | TEMPERATURE: 98.7 F

## 2025-04-01 DIAGNOSIS — T14.8XXA SKIN WOUND FROM SURGICAL INCISION: Primary | ICD-10-CM

## 2025-04-01 DIAGNOSIS — M54.2: ICD-10-CM

## 2025-04-01 DIAGNOSIS — Z98.1 STATUS POST CERVICAL SPINAL FUSION: ICD-10-CM

## 2025-04-01 PROCEDURE — 99214 OFFICE O/P EST MOD 30 MIN: CPT | Performed by: PHYSICIAN ASSISTANT

## 2025-04-01 PROCEDURE — 1036F TOBACCO NON-USER: CPT | Performed by: PHYSICIAN ASSISTANT

## 2025-04-01 PROCEDURE — 3008F BODY MASS INDEX DOCD: CPT | Performed by: PHYSICIAN ASSISTANT

## 2025-04-01 ASSESSMENT — PATIENT HEALTH QUESTIONNAIRE - PHQ9
8. MOVING OR SPEAKING SO SLOWLY THAT OTHER PEOPLE COULD HAVE NOTICED. OR THE OPPOSITE, BEING SO FIGETY OR RESTLESS THAT YOU HAVE BEEN MOVING AROUND A LOT MORE THAN USUAL: NEARLY EVERY DAY
SUM OF ALL RESPONSES TO PHQ QUESTIONS 1-9: 25
6. FEELING BAD ABOUT YOURSELF - OR THAT YOU ARE A FAILURE OR HAVE LET YOURSELF OR YOUR FAMILY DOWN: NEARLY EVERY DAY
3. TROUBLE FALLING OR STAYING ASLEEP: NEARLY EVERY DAY
10. IF YOU CHECKED OFF ANY PROBLEMS, HOW DIFFICULT HAVE THESE PROBLEMS MADE IT FOR YOU TO DO YOUR WORK, TAKE CARE OF THINGS AT HOME, OR GET ALONG WITH OTHER PEOPLE: EXTREMELY DIFFICULT
9. THOUGHTS THAT YOU WOULD BE BETTER OFF DEAD, OR OF HURTING YOURSELF: NEARLY EVERY DAY
7. TROUBLE CONCENTRATING ON THINGS, SUCH AS READING THE NEWSPAPER OR WATCHING TELEVISION: NEARLY EVERY DAY
2. FEELING DOWN, DEPRESSED OR HOPELESS: NEARLY EVERY DAY
4. FEELING TIRED OR HAVING LITTLE ENERGY: NEARLY EVERY DAY
1. LITTLE INTEREST OR PLEASURE IN DOING THINGS: NEARLY EVERY DAY
5. POOR APPETITE OR OVEREATING: SEVERAL DAYS
SUM OF ALL RESPONSES TO PHQ9 QUESTIONS 1 & 2: 6

## 2025-04-01 ASSESSMENT — PAIN SCALES - GENERAL: PAINLEVEL_OUTOF10: 7

## 2025-04-01 NOTE — PROGRESS NOTES
[facility] [place]  Department of Neurological Surgery  Established Patient Visit    History of Present Illness  Lion Means is a 54 y.o. year old male who presents to the spine clinic in follow up with prominence of posterior cervical spinous process and cervical fusion hardware.  Most recently undergoing posterior cervical extension of prior fusion by Dr. Romeo approximately 2 years prior.  In interim he has had a degree of muscle atrophy and realignment which has led to thinning of scar tissue and more underlying prominence of bony and other hard structures.  He denies any further radiating pain, numbness, weakness in upper extremities.  Strength today is noticeably improved versus preoperative.  Inspection of posterior cervical region shows visible lesion mid scar at inferior portion.  Continues to ambulate with use of cane at a very slow and controlled pace.        Patient's BMI is Body mass index is 33.42 kg/m².    14/14 systems reviewed and negative other than what is listed in the history of present illness    There is no problem list on file for this patient.    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    Penetrating keratoplasty graft in place    Keratoconus, unspecified, unspecified eye 08/25/2015    Keratoconus    Unspecified cataract 08/25/2015    Cataract of right eye    Unspecified cataract 08/25/2015    Cataract of left eye     Past Surgical History:   Procedure Laterality Date    BACK SURGERY  04/24/2015    Lower Back Surgery    GASTRIC RESTRICTION SURGERY  04/24/2015    Gastric Surgery For Morbid Obesity    OTHER SURGICAL HISTORY  08/25/2015    Cornea Transplant Penetrating    OTHER SURGICAL HISTORY  09/08/2022    Knee replacement     Social History     Tobacco Use    Smoking status: Never    Smokeless tobacco: Never   Substance Use Topics    Alcohol use: Never     family history is not on file.    Current Outpatient Medications:      albuterol (Ventolin HFA) 90 mcg/actuation inhaler, Inhale 2 puffs every 4 hours if needed., Disp: , Rfl:     ALPRAZolam (Xanax) 0.5 mg tablet, , Disp: , Rfl:     arformoterol (Brovana) 15 mcg/2 mL nebulizer solution, Inhale 2 mL (15 mcg) twice a day., Disp: , Rfl:     atomoxetine (Strattera) 40 mg capsule, Take 1 capsule (40 mg) by mouth once daily., Disp: , Rfl:     baclofen (Lioresal) 20 mg tablet, Take 1 tablet (20 mg) by mouth 3 times a day as needed., Disp: , Rfl:     budesonide-formoteroL (Symbicort) 160-4.5 mcg/actuation inhaler, INHALE 2 PUFFS BY MOUTH TWICE A DAY IN THE MORNING AND BEFORE BEDTIME *RINSE MOUTH WITH WATER AFTER USE TO REDUCE AFTERTASTE AND INCIDENCE OF CANDIDIASIS, DO NOT SWALLOW*, Disp: , Rfl:     calcium carbonate (Tums) 200 mg calcium chewable tablet, Chew 2 tablets (1,000 mg) 3 times a day., Disp: , Rfl:     clonazePAM (KlonoPIN) 1 mg tablet, Take 1 tablet (1 mg) by mouth., Disp: , Rfl:     cyanocobalamin (Vitamin B-12) 1,000 mcg/mL injection, Inject 1 mL (1,000 mcg) into the muscle every 30 (thirty) days., Disp: , Rfl:     cyclobenzaprine (Flexeril) 5 mg tablet, Take 1 tablet (5 mg) by mouth 2 times a day as needed., Disp: , Rfl:     diphenoxylate-atropine (Lomotil) 2.5-0.025 mg tablet, Take 2 tablets by mouth 4 times a day as needed., Disp: , Rfl:     doxycycline (Adoxa) 100 mg tablet, Take 1 tablet (100 mg) by mouth twice a day., Disp: , Rfl:     ferrous sulfate tablet, , Disp: , Rfl:     fludrocortisone (Florinef) 0.1 mg tablet, Take 1 tablet (0.1 mg) by mouth., Disp: , Rfl:     furosemide (Lasix) 20 mg tablet, Take 3 tablets (60 mg) by mouth once daily., Disp: , Rfl:     hydrOXYzine HCL (Atarax) 25 mg tablet, Take by mouth every 6 hours if needed., Disp: , Rfl:     levothyroxine (Synthroid, Levoxyl) 100 mcg tablet, Take 1 tablet (100 mcg) by mouth., Disp: , Rfl:     loperamide (Imodium) 2 mg capsule, Take by mouth., Disp: , Rfl:     mirtazapine (Remeron) 7.5 mg tablet, Take 1 tablet  (7.5 mg) by mouth once daily as needed., Disp: , Rfl:     nortriptyline (Pamelor) 75 mg capsule, Take 2 capsules (150 mg) by mouth., Disp: , Rfl:     Nurtec ODT 75 mg tablet,disintegrating, , Disp: , Rfl:     omeprazole (PriLOSEC) 40 mg DR capsule, Take 1 capsule (40 mg) by mouth., Disp: , Rfl:     ondansetron (Zofran) 8 mg tablet, TAKE ONE TABLET BY MOUTH EVERY 6 HOURS AS NEEDED FOR NAUSEA AND/OR VOMITING, Disp: , Rfl:     oxyCODONE (Roxicodone) 5 mg immediate release tablet, Take 1-2 tablets (5-10 mg) by mouth every 8 hours if needed., Disp: , Rfl:     pasireotide diaspartate (Signifor) 0.6 mg/mL (1 mL) solution, Inject 1 mL (0.6 mg) under the skin twice a day., Disp: , Rfl:     pregabalin (Lyrica) 150 mg capsule, Take 1 capsule (150 mg) by mouth 3 times a day., Disp: , Rfl:     prochlorperazine (Compazine) 5 mg tablet, Take 1 tablet (5 mg) by mouth every 6 hours if needed., Disp: , Rfl:     pyridostigmine (Mestinon) 60 mg tablet, Take 1 tablet (60 mg) by mouth 3 times a day., Disp: , Rfl:     QUEtiapine (SEROquel) 50 mg tablet, TAKE 1 TABLET BY MOUTH EVERY MORNING , TAKE ONE TABLET BY MOUTH EVERY EVENING , AND BEFORE BEDTIME, Disp: , Rfl:     testosterone cypionate (Depo-Testosterone) 200 mg/mL injection, Inject 1.5 mL (300 mg) into the muscle every 14 (fourteen) days., Disp: , Rfl:     Vraylar 3 mg capsule, Take 4.5 mg by mouth once daily., Disp: , Rfl:   Allergies   Allergen Reactions    Adhesive Tape-Silicones Itching and Rash     Itching and rash with some types of adhesive tapes but not all products       Physical Examination:    General: Well developed, awake/alert/oriented x3, no distress, alert and cooperative  Skin: Warm and dry, no lesions, no rashes  ENMT: Mucous membranes moist, no apparent injury, no lesions seen  Head/Neck: Neck Supple, no apparent injury  Respiratory/Thorax: Normal breath sounds with good chest expansion, thorax symmetric  Cardiovascular: No pitting edema, no JVD    Motor Strength:  5/5 Throughout upper extremities    Muscle Bulk: Normal and symmetric in all extremities    Posture:   -- Cervical: Normal  -- Thoracic: Normal  -- Lumbar : Normal  Paraspinal muscle spasm/tenderness cervical bilateral.   Midline tenderness present cervical    Sensation: intact to light touch          Assessment and Plan:  Lion Means is a 54 y.o. year old male who presents to the spine clinic in follow up with prominence of posterior cervical spinous process and cervical fusion hardware.  Most recently undergoing posterior cervical extension of prior fusion by Dr. Romeo approximately 2 years prior.  In interim he has had a degree of muscle atrophy and realignment which has led to thinning of scar tissue and more underlying prominence of bony and other hard structures.  He denies any further radiating pain, numbness, weakness in upper extremities.  Strength today is noticeably improved versus preoperative.  Inspection of posterior cervical region shows visible lesion mid scar at inferior portion.  Continues to ambulate with use of cane at a very slow and controlled pace.    Patient has met with plastic surgery team and is recommended revision overlying with spinous process improvement in cooperation with neurosurgery.  Will need updated x-rays, CT, and MRI cervical and thoracic spine for evaluation of overall bony alignment and soft tissue/neurological structures.      I have reviewed all prior documentation and reviewed the electronic medical record since admission. I have personally have reviewed all advanced imaging not just the reports and used my interpretation as documented as the relevant findings. I have reviewed the risks and benefits of all treatment recommendations listed in this note with the patient and family.       The above clinical summary has been dictated with voice recognition software. It has not been proofread for grammatical errors, typographical mistakes, or other semantic  inconsistencies.    Thank you for visiting our office today. It was our pleasure to take part in your healthcare.     Do not hesitate to call with any questions regarding your plan of care after leaving at [phone] M-F 8am-4pm.     To clinicians, thank you very much for this kind referral. It is a privilege to partner with you in the care of your patients. My office would be delighted to assist you with any further consultations or with questions regarding the plan of care outlined. Do not hesitate to call the office or contact me directly.       Sincerely,      RONALD Goyal, PA-C  Associate Physician Assistant, Neurosurgery  Clinical   [facility] [employer] [place] [address]    Phone: [phone]  Fax: [phone]

## (undated) DEVICE — WAX SURG 2.5GM HEMSTAT BNE BEESWAX PARAFFIN ISO PALMITATE

## (undated) DEVICE — DRAPE SURG W41XL74IN CLR FULL SZ C ARM 3 ADH POLY STRP E

## (undated) DEVICE — GLOVE ORANGE PI 7 1/2   MSG9075

## (undated) DEVICE — SPONGE GZ W4XL4IN RAYON POLY FILL CVR W/ NONWOVEN FAB

## (undated) DEVICE — GOWN,AURORA,NONREINFORCED,LARGE: Brand: MEDLINE

## (undated) DEVICE — 3M™ IOBAN™ 2 ANTIMICROBIAL INCISE DRAPE 6650EZ: Brand: IOBAN™ 2

## (undated) DEVICE — Z DISCONTINUED APPLICATOR SURG PREP 0.35OZ 2% CHG 70% ISO ALC W/ HI LT

## (undated) DEVICE — SUTURE VCRL SZ 0 L36IN ABSRB VLT L36MM CT-1 1/2 CIR J346H

## (undated) DEVICE — CODMAN® SURGICAL PATTIES 1/2" X1 1/2" (1.27CM X 3.81CM): Brand: CODMAN®

## (undated) DEVICE — TOWEL,OR,DSP,ST,BLUE,STD,4/PK,20PK/CS: Brand: MEDLINE

## (undated) DEVICE — APPLICATOR MEDICATED 26 CC SOLUTION HI LT ORNG CHLORAPREP

## (undated) DEVICE — INTENDED FOR TISSUE SEPARATION, AND OTHER PROCEDURES THAT REQUIRE A SHARP SURGICAL BLADE TO PUNCTURE OR CUT.: Brand: BARD-PARKER ® CARBON RIB-BACK BLADES

## (undated) DEVICE — AGENT HEMSTAT 1GM PORCINE GEL ABSRB PWD FOR CONT OOZING

## (undated) DEVICE — LABEL MED MINI W/ MARKER

## (undated) DEVICE — SUTURE VCRL SZ 3-0 L27IN ABSRB UD L26MM SH 1/2 CIR J416H

## (undated) DEVICE — DRAPE EQUIP TRNSPRT CONTAINMENT FOR BK TAB

## (undated) DEVICE — PACK,LAPAROTOMY,NO GOWNS: Brand: MEDLINE

## (undated) DEVICE — ELECTRODE ES L275IN 275IN BLDE TIP COAT PTFE TEF W EVAC

## (undated) DEVICE — COUNTER NDL 40 COUNT HLD 70 FOAM BLK ADH W/ MAG

## (undated) DEVICE — PENCIL SMOKE EVAC PUSH BUTTON COATED

## (undated) DEVICE — CODMAN® SURGICAL PATTIES 1/2" X 1/2" (1.27CM X 1.27CM): Brand: CODMAN®

## (undated) DEVICE — SECTO® DISSECTOR, KITTNER, 5/16 IN DIAMETER, (5 EA/POUCH, 24 POUCHES/PK, 4 PK/BX): Brand: SYMMETRY SURGICAL

## (undated) DEVICE — TAPE,CLOTH/SILK,CURAD,3"X10YD,LF,40/CS: Brand: CURAD

## (undated) DEVICE — ELECTRODE BLDE L4IN NONINSULATED EDGE

## (undated) DEVICE — COVER LT HNDL BLU PLAS

## (undated) DEVICE — CODMAN® SURGICAL PATTIES 3/4" X 3/4" (1.91CM X 1.91CM): Brand: CODMAN®

## (undated) DEVICE — MONITORING NEURO W INTERPRETATION SYS PRICING

## (undated) DEVICE — SYRINGE IRRIG 60ML SFT PLIABLE BLB EZ TO GRP 1 HND USE W/

## (undated) DEVICE — CARBIDE ROUND

## (undated) DEVICE — ELECTRODE PT RET AD L9FT HI MOIST COND ADH HYDRGEL CORDED

## (undated) DEVICE — COVER,TABLE,44X90,STERILE: Brand: MEDLINE

## (undated) DEVICE — CARBIDE MATCH HEAD

## (undated) DEVICE — GAUZE,SPONGE,4"X4",16PLY,XRAY,STRL,LF: Brand: MEDLINE

## (undated) DEVICE — 3M™ STERI-DRAPE™ INSTRUMENT POUCH 1018: Brand: STERI-DRAPE™

## (undated) DEVICE — MARKER SURG SKIN GENTIAN VLT REG TIP W/ 6IN RUL

## (undated) DEVICE — SHAFT SURG DRL LEVERAGE LFS DISPOSABLE 7MM

## (undated) DEVICE — CATHETER IV 14 GAX2 IN STR INTROCAN SAFETY

## (undated) DEVICE — OIL CARTRIDGE: Brand: CORE, MAESTRO

## (undated) DEVICE — 4-PORT MANIFOLD: Brand: NEPTUNE 2

## (undated) DEVICE — SUTURE VCRL SZ 2-0 L36IN ABSRB VLT L36MM CT-1 1/2 CIR J345H

## (undated) DEVICE — E-Z CLEAN, NON-STICK, PTFE COATED, ELECTROSURGICAL BLADE ELECTRODE, MODIFIED EXTENDED INSULATION, 2.5 INCH (6.35 CM): Brand: MEGADYNE

## (undated) DEVICE — SHEET,DRAPE,53X77,STERILE: Brand: MEDLINE

## (undated) DEVICE — SHAFT DRL 5MM FOR LAMINOPLASTY FIX SYS DISP LEVERAGE LFS

## (undated) DEVICE — BIPOLAR IRRIGATOR INTEGRATED TUBING AND BIPOLAR CORD SET, DISPOSABLE

## (undated) DEVICE — CONTAINER,SPECIMEN,OR STERILE,4OZ: Brand: MEDLINE

## (undated) DEVICE — GLOVE SURG SZ 8 L12IN FNGR THK94MIL TRNSLUC YEL LTX HYDRGEL

## (undated) DEVICE — NEEDLE SPNL 18GA L3.5IN W/ QNCKE SHARPER BVL DURA CLICK

## (undated) DEVICE — TZ MEDICAL TITANIUM DISTRACTION PINS 12MM: Brand: TZ MEDICAL TITANIUM DISTRACTION PINS

## (undated) DEVICE — SUTURE VCRL SZ 3-0 L27IN ABSRB VLT CT-2 L26MM 1/2 CIR J332H

## (undated) DEVICE — NEPTUNE E-SEP SMOKE EVACUATION PENCIL, COATED, 70MM BLADE, PUSH BUTTON SWITCH: Brand: NEPTUNE E-SEP

## (undated) DEVICE — Device

## (undated) DEVICE — BANDAGE,GAUZE,BULKEE II,4.5"X4.1YD,STRL: Brand: MEDLINE

## (undated) DEVICE — DIFFUSER: Brand: CORE, MAESTRO

## (undated) DEVICE — PAD,NON-ADHERENT,3X8,STERILE,LF,1/PK: Brand: MEDLINE

## (undated) DEVICE — BIT DRL L13MM DIA2.5MM PWR HELIX

## (undated) DEVICE — TUBING, SUCTION, 1/4" X 10', STRAIGHT: Brand: MEDLINE